# Patient Record
Sex: MALE | NOT HISPANIC OR LATINO | Employment: FULL TIME | URBAN - METROPOLITAN AREA
[De-identification: names, ages, dates, MRNs, and addresses within clinical notes are randomized per-mention and may not be internally consistent; named-entity substitution may affect disease eponyms.]

---

## 2018-01-04 ENCOUNTER — OFFICE VISIT CONVERTED (OUTPATIENT)
Dept: FAMILY MEDICINE CLINIC | Facility: CLINIC | Age: 43
End: 2018-01-04
Attending: NURSE PRACTITIONER

## 2018-04-26 ENCOUNTER — OFFICE VISIT CONVERTED (OUTPATIENT)
Dept: FAMILY MEDICINE CLINIC | Facility: CLINIC | Age: 43
End: 2018-04-26
Attending: NURSE PRACTITIONER

## 2018-06-12 ENCOUNTER — OFFICE VISIT CONVERTED (OUTPATIENT)
Dept: FAMILY MEDICINE CLINIC | Facility: CLINIC | Age: 43
End: 2018-06-12
Attending: NURSE PRACTITIONER

## 2018-07-16 ENCOUNTER — OFFICE VISIT CONVERTED (OUTPATIENT)
Dept: FAMILY MEDICINE CLINIC | Facility: CLINIC | Age: 43
End: 2018-07-16
Attending: NURSE PRACTITIONER

## 2018-08-16 ENCOUNTER — OFFICE VISIT CONVERTED (OUTPATIENT)
Dept: FAMILY MEDICINE CLINIC | Facility: CLINIC | Age: 43
End: 2018-08-16
Attending: NURSE PRACTITIONER

## 2019-02-20 ENCOUNTER — OFFICE VISIT CONVERTED (OUTPATIENT)
Dept: FAMILY MEDICINE CLINIC | Facility: CLINIC | Age: 44
End: 2019-02-20
Attending: NURSE PRACTITIONER

## 2019-03-05 ENCOUNTER — HOSPITAL ENCOUNTER (OUTPATIENT)
Dept: LAB | Facility: HOSPITAL | Age: 44
Discharge: HOME | End: 2019-03-05
Attending: NURSE PRACTITIONER

## 2019-03-05 LAB
ALBUMIN SERPL-MCNC: 4.7 G/DL (ref 3.5–5)
ALBUMIN/GLOB SERPL: 1.7 {RATIO} (ref 1.4–2.6)
ALP SERPL-CCNC: 90 U/L (ref 53–128)
ALT SERPL-CCNC: 45 U/L (ref 10–40)
ANION GAP SERPL CALC-SCNC: 18 MMOL/L (ref 8–19)
AST SERPL-CCNC: 34 U/L (ref 15–50)
BASOPHILS # BLD AUTO: 0.09 10*3/UL (ref 0–0.2)
BASOPHILS NFR BLD AUTO: 1 % (ref 0–3)
BILIRUB SERPL-MCNC: 0.68 MG/DL (ref 0.2–1.3)
BUN SERPL-MCNC: 10 MG/DL (ref 5–25)
BUN/CREAT SERPL: 9 {RATIO} (ref 6–20)
CALCIUM SERPL-MCNC: 9.4 MG/DL (ref 8.7–10.4)
CHLORIDE SERPL-SCNC: 104 MMOL/L (ref 99–111)
CHOLEST SERPL-MCNC: 256 MG/DL (ref 107–200)
CHOLEST/HDLC SERPL: 5.1 {RATIO} (ref 3–6)
CONV ABS IMM GRAN: 0.04 10*3/UL (ref 0–0.2)
CONV CO2: 28 MMOL/L (ref 22–32)
CONV IMMATURE GRAN: 0.4 % (ref 0–1.8)
CONV TOTAL PROTEIN: 7.4 G/DL (ref 6.3–8.2)
CREAT UR-MCNC: 1.06 MG/DL (ref 0.7–1.2)
DEPRECATED RDW RBC AUTO: 45.8 FL (ref 35.1–43.9)
EOSINOPHIL # BLD AUTO: 0.22 10*3/UL (ref 0–0.7)
EOSINOPHIL # BLD AUTO: 2.3 % (ref 0–7)
ERYTHROCYTE [DISTWIDTH] IN BLOOD BY AUTOMATED COUNT: 13.2 % (ref 11.6–14.4)
GFR SERPLBLD BASED ON 1.73 SQ M-ARVRAT: >60 ML/MIN/{1.73_M2}
GLOBULIN UR ELPH-MCNC: 2.7 G/DL (ref 2–3.5)
GLUCOSE SERPL-MCNC: 100 MG/DL (ref 70–99)
HBA1C MFR BLD: 18.1 G/DL (ref 14–18)
HCT VFR BLD AUTO: 52.4 % (ref 42–52)
HDLC SERPL-MCNC: 50 MG/DL (ref 40–60)
LDLC SERPL CALC-MCNC: 130 MG/DL (ref 70–100)
LYMPHOCYTES # BLD AUTO: 2.79 10*3/UL (ref 1–5)
MCH RBC QN AUTO: 32.6 PG (ref 27–31)
MCHC RBC AUTO-ENTMCNC: 34.5 G/DL (ref 33–37)
MCV RBC AUTO: 94.2 FL (ref 80–96)
MONOCYTES # BLD AUTO: 0.96 10*3/UL (ref 0.2–1.2)
MONOCYTES NFR BLD AUTO: 10.2 % (ref 3–10)
NEUTROPHILS # BLD AUTO: 5.3 10*3/UL (ref 2–8)
NEUTROPHILS NFR BLD AUTO: 56.4 % (ref 30–85)
NRBC CBCN: 0 % (ref 0–0.7)
OSMOLALITY SERPL CALC.SUM OF ELEC: 299 MOSM/KG (ref 273–304)
PLATELET # BLD AUTO: 324 10*3/UL (ref 130–400)
PMV BLD AUTO: 9.6 FL (ref 9.4–12.4)
POTASSIUM SERPL-SCNC: 4.5 MMOL/L (ref 3.5–5.3)
RBC # BLD AUTO: 5.56 10*6/UL (ref 4.7–6.1)
SODIUM SERPL-SCNC: 145 MMOL/L (ref 135–147)
T4 FREE SERPL-MCNC: 1.2 NG/DL (ref 0.9–1.8)
TRIGL SERPL-MCNC: 382 MG/DL (ref 40–150)
TSH SERPL-ACNC: 1.41 M[IU]/L (ref 0.27–4.2)
VARIANT LYMPHS NFR BLD MANUAL: 29.7 % (ref 20–45)
VLDLC SERPL-MCNC: 76 MG/DL (ref 5–37)
WBC # BLD AUTO: 9.4 10*3/UL (ref 4.8–10.8)

## 2019-08-13 ENCOUNTER — HOSPITAL ENCOUNTER (OUTPATIENT)
Dept: LAB | Facility: HOSPITAL | Age: 44
Discharge: HOME | End: 2019-08-13
Attending: NURSE PRACTITIONER

## 2019-08-13 LAB
ALBUMIN SERPL-MCNC: 4.8 G/DL (ref 3.5–5)
ALBUMIN/GLOB SERPL: 1.9 {RATIO} (ref 1.4–2.6)
ALP SERPL-CCNC: 70 U/L (ref 53–128)
ALT SERPL-CCNC: 38 U/L (ref 10–40)
ANION GAP SERPL CALC-SCNC: 17 MMOL/L (ref 8–19)
AST SERPL-CCNC: 31 U/L (ref 15–50)
BILIRUB SERPL-MCNC: 0.57 MG/DL (ref 0.2–1.3)
BUN SERPL-MCNC: 9 MG/DL (ref 5–25)
BUN/CREAT SERPL: 10 {RATIO} (ref 6–20)
CALCIUM SERPL-MCNC: 9.5 MG/DL (ref 8.7–10.4)
CHLORIDE SERPL-SCNC: 104 MMOL/L (ref 99–111)
CHOLEST SERPL-MCNC: 197 MG/DL (ref 107–200)
CHOLEST/HDLC SERPL: 3.1 {RATIO} (ref 3–6)
CONV CO2: 24 MMOL/L (ref 22–32)
CONV TOTAL PROTEIN: 7.3 G/DL (ref 6.3–8.2)
CREAT UR-MCNC: 0.94 MG/DL (ref 0.7–1.2)
GFR SERPLBLD BASED ON 1.73 SQ M-ARVRAT: >60 ML/MIN/{1.73_M2}
GLOBULIN UR ELPH-MCNC: 2.5 G/DL (ref 2–3.5)
GLUCOSE SERPL-MCNC: 99 MG/DL (ref 70–99)
HDLC SERPL-MCNC: 64 MG/DL (ref 40–60)
LDLC SERPL CALC-MCNC: 115 MG/DL (ref 70–100)
OSMOLALITY SERPL CALC.SUM OF ELEC: 291 MOSM/KG (ref 273–304)
POTASSIUM SERPL-SCNC: 4.4 MMOL/L (ref 3.5–5.3)
SODIUM SERPL-SCNC: 141 MMOL/L (ref 135–147)
TRIGL SERPL-MCNC: 91 MG/DL (ref 40–150)
VLDLC SERPL-MCNC: 18 MG/DL (ref 5–37)

## 2019-08-20 ENCOUNTER — CONVERSION ENCOUNTER (OUTPATIENT)
Dept: OTHER | Facility: HOSPITAL | Age: 44
End: 2019-08-20

## 2019-08-20 ENCOUNTER — OFFICE VISIT CONVERTED (OUTPATIENT)
Dept: FAMILY MEDICINE CLINIC | Facility: CLINIC | Age: 44
End: 2019-08-20
Attending: NURSE PRACTITIONER

## 2020-01-28 ENCOUNTER — OFFICE VISIT CONVERTED (OUTPATIENT)
Dept: FAMILY MEDICINE CLINIC | Facility: CLINIC | Age: 45
End: 2020-01-28
Attending: NURSE PRACTITIONER

## 2020-02-28 ENCOUNTER — HOSPITAL ENCOUNTER (OUTPATIENT)
Dept: LAB | Facility: HOSPITAL | Age: 45
Discharge: HOME | End: 2020-02-28
Attending: NURSE PRACTITIONER

## 2020-02-28 LAB
ALBUMIN SERPL-MCNC: 4.8 G/DL (ref 3.5–5)
ALBUMIN/GLOB SERPL: 2 {RATIO} (ref 1.4–2.6)
ALP SERPL-CCNC: 51 U/L (ref 53–128)
ALT SERPL-CCNC: 24 U/L (ref 10–40)
ANION GAP SERPL CALC-SCNC: 19 MMOL/L (ref 8–19)
AST SERPL-CCNC: 23 U/L (ref 15–50)
BASOPHILS # BLD AUTO: 0.07 10*3/UL (ref 0–0.2)
BASOPHILS NFR BLD AUTO: 1.1 % (ref 0–3)
BILIRUB SERPL-MCNC: 0.76 MG/DL (ref 0.2–1.3)
BUN SERPL-MCNC: 11 MG/DL (ref 5–25)
BUN/CREAT SERPL: 13 {RATIO} (ref 6–20)
CALCIUM SERPL-MCNC: 9.7 MG/DL (ref 8.7–10.4)
CHLORIDE SERPL-SCNC: 99 MMOL/L (ref 99–111)
CHOLEST SERPL-MCNC: 266 MG/DL (ref 107–200)
CHOLEST/HDLC SERPL: 3.9 {RATIO} (ref 3–6)
CONV ABS IMM GRAN: 0.04 10*3/UL (ref 0–0.2)
CONV CO2: 26 MMOL/L (ref 22–32)
CONV IMMATURE GRAN: 0.6 % (ref 0–1.8)
CONV TOTAL PROTEIN: 7.2 G/DL (ref 6.3–8.2)
CREAT UR-MCNC: 0.85 MG/DL (ref 0.7–1.2)
DEPRECATED RDW RBC AUTO: 46.6 FL (ref 35.1–43.9)
EOSINOPHIL # BLD AUTO: 0.22 10*3/UL (ref 0–0.7)
EOSINOPHIL # BLD AUTO: 3.4 % (ref 0–7)
ERYTHROCYTE [DISTWIDTH] IN BLOOD BY AUTOMATED COUNT: 13.5 % (ref 11.6–14.4)
GFR SERPLBLD BASED ON 1.73 SQ M-ARVRAT: >60 ML/MIN/{1.73_M2}
GLOBULIN UR ELPH-MCNC: 2.4 G/DL (ref 2–3.5)
GLUCOSE SERPL-MCNC: 84 MG/DL (ref 70–99)
HCT VFR BLD AUTO: 51.8 % (ref 42–52)
HDLC SERPL-MCNC: 68 MG/DL (ref 40–60)
HGB BLD-MCNC: 17.5 G/DL (ref 14–18)
LDLC SERPL CALC-MCNC: 169 MG/DL (ref 70–100)
LYMPHOCYTES # BLD AUTO: 2.18 10*3/UL (ref 1–5)
LYMPHOCYTES NFR BLD AUTO: 33.2 % (ref 20–45)
MCH RBC QN AUTO: 31.6 PG (ref 27–31)
MCHC RBC AUTO-ENTMCNC: 33.8 G/DL (ref 33–37)
MCV RBC AUTO: 93.5 FL (ref 80–96)
MONOCYTES # BLD AUTO: 0.6 10*3/UL (ref 0.2–1.2)
MONOCYTES NFR BLD AUTO: 9.1 % (ref 3–10)
NEUTROPHILS # BLD AUTO: 3.45 10*3/UL (ref 2–8)
NEUTROPHILS NFR BLD AUTO: 52.6 % (ref 30–85)
NRBC CBCN: 0 % (ref 0–0.7)
OSMOLALITY SERPL CALC.SUM OF ELEC: 287 MOSM/KG (ref 273–304)
PLATELET # BLD AUTO: 283 10*3/UL (ref 130–400)
PMV BLD AUTO: 9 FL (ref 9.4–12.4)
POTASSIUM SERPL-SCNC: 4.5 MMOL/L (ref 3.5–5.3)
RBC # BLD AUTO: 5.54 10*6/UL (ref 4.7–6.1)
SODIUM SERPL-SCNC: 139 MMOL/L (ref 135–147)
T4 FREE SERPL-MCNC: 1.6 NG/DL (ref 0.9–1.8)
TRIGL SERPL-MCNC: 143 MG/DL (ref 40–150)
TSH SERPL-ACNC: 0.84 M[IU]/L (ref 0.27–4.2)
VLDLC SERPL-MCNC: 29 MG/DL (ref 5–37)
WBC # BLD AUTO: 6.56 10*3/UL (ref 4.8–10.8)

## 2020-06-22 ENCOUNTER — OFFICE VISIT CONVERTED (OUTPATIENT)
Dept: ORTHOPEDIC SURGERY | Facility: CLINIC | Age: 45
End: 2020-06-22
Attending: ORTHOPAEDIC SURGERY

## 2020-07-10 ENCOUNTER — HOSPITAL ENCOUNTER (OUTPATIENT)
Dept: URGENT CARE | Facility: CLINIC | Age: 45
Discharge: HOME | End: 2020-07-10
Attending: NURSE PRACTITIONER

## 2020-07-13 LAB — SARS-COV-2 RNA SPEC QL NAA+PROBE: NOT DETECTED

## 2020-07-28 ENCOUNTER — TELEMEDICINE CONVERTED (OUTPATIENT)
Dept: FAMILY MEDICINE CLINIC | Facility: CLINIC | Age: 45
End: 2020-07-28
Attending: NURSE PRACTITIONER

## 2020-07-28 ENCOUNTER — HOSPITAL ENCOUNTER (OUTPATIENT)
Dept: URGENT CARE | Facility: CLINIC | Age: 45
Discharge: HOME | End: 2020-07-28
Attending: NURSE PRACTITIONER

## 2020-07-30 LAB — SARS-COV-2 RNA SPEC QL NAA+PROBE: NOT DETECTED

## 2020-08-07 ENCOUNTER — HOSPITAL ENCOUNTER (OUTPATIENT)
Dept: URGENT CARE | Facility: CLINIC | Age: 45
Discharge: HOME | End: 2020-08-07
Attending: NURSE PRACTITIONER

## 2020-08-10 LAB — SARS-COV-2 RNA SPEC QL NAA+PROBE: NOT DETECTED

## 2020-09-09 ENCOUNTER — HOSPITAL ENCOUNTER (OUTPATIENT)
Dept: GENERAL RADIOLOGY | Facility: HOSPITAL | Age: 45
Discharge: HOME | End: 2020-09-09
Attending: NURSE PRACTITIONER

## 2020-09-14 ENCOUNTER — OFFICE VISIT CONVERTED (OUTPATIENT)
Dept: SURGERY | Facility: CLINIC | Age: 45
End: 2020-09-14
Attending: NURSE PRACTITIONER

## 2020-11-25 ENCOUNTER — HOSPITAL ENCOUNTER (OUTPATIENT)
Dept: PREADMISSION TESTING | Facility: HOSPITAL | Age: 45
Discharge: HOME | End: 2020-11-25
Attending: SURGERY

## 2020-11-27 LAB — SARS-COV-2 RNA SPEC QL NAA+PROBE: NOT DETECTED

## 2020-11-30 ENCOUNTER — HOSPITAL ENCOUNTER (OUTPATIENT)
Dept: GASTROENTEROLOGY | Facility: HOSPITAL | Age: 45
Setting detail: HOSPITAL OUTPATIENT SURGERY
Discharge: HOME | End: 2020-11-30
Attending: SURGERY

## 2020-12-14 ENCOUNTER — OFFICE VISIT CONVERTED (OUTPATIENT)
Dept: SURGERY | Facility: CLINIC | Age: 45
End: 2020-12-14
Attending: NURSE PRACTITIONER

## 2021-02-03 ENCOUNTER — OFFICE VISIT CONVERTED (OUTPATIENT)
Dept: FAMILY MEDICINE CLINIC | Facility: CLINIC | Age: 46
End: 2021-02-03
Attending: NURSE PRACTITIONER

## 2021-03-18 ENCOUNTER — HOSPITAL ENCOUNTER (OUTPATIENT)
Dept: LAB | Facility: HOSPITAL | Age: 46
Discharge: HOME | End: 2021-03-18
Attending: NURSE PRACTITIONER

## 2021-03-18 LAB
ALBUMIN SERPL-MCNC: 4.6 G/DL (ref 3.5–5)
ALBUMIN/GLOB SERPL: 1.6 {RATIO} (ref 1.4–2.6)
ALP SERPL-CCNC: 70 U/L (ref 53–128)
ALT SERPL-CCNC: 30 U/L (ref 10–40)
ANION GAP SERPL CALC-SCNC: 14 MMOL/L (ref 8–19)
AST SERPL-CCNC: 25 U/L (ref 15–50)
BASOPHILS # BLD AUTO: 0.07 10*3/UL (ref 0–0.2)
BASOPHILS NFR BLD AUTO: 0.8 % (ref 0–3)
BILIRUB SERPL-MCNC: 0.55 MG/DL (ref 0.2–1.3)
BUN SERPL-MCNC: 15 MG/DL (ref 5–25)
BUN/CREAT SERPL: 16 {RATIO} (ref 6–20)
CALCIUM SERPL-MCNC: 9.7 MG/DL (ref 8.7–10.4)
CHLORIDE SERPL-SCNC: 105 MMOL/L (ref 99–111)
CHOLEST SERPL-MCNC: 174 MG/DL (ref 107–200)
CHOLEST/HDLC SERPL: 2.7 {RATIO} (ref 3–6)
CONV ABS IMM GRAN: 0.03 10*3/UL (ref 0–0.2)
CONV CO2: 27 MMOL/L (ref 22–32)
CONV IMMATURE GRAN: 0.4 % (ref 0–1.8)
CONV TOTAL PROTEIN: 7.4 G/DL (ref 6.3–8.2)
CREAT UR-MCNC: 0.93 MG/DL (ref 0.7–1.2)
DEPRECATED RDW RBC AUTO: 45.9 FL (ref 35.1–43.9)
EOSINOPHIL # BLD AUTO: 0.2 10*3/UL (ref 0–0.7)
EOSINOPHIL # BLD AUTO: 2.4 % (ref 0–7)
ERYTHROCYTE [DISTWIDTH] IN BLOOD BY AUTOMATED COUNT: 13.4 % (ref 11.6–14.4)
GFR SERPLBLD BASED ON 1.73 SQ M-ARVRAT: >60 ML/MIN/{1.73_M2}
GLOBULIN UR ELPH-MCNC: 2.8 G/DL (ref 2–3.5)
GLUCOSE SERPL-MCNC: 105 MG/DL (ref 70–99)
HCT VFR BLD AUTO: 47.9 % (ref 42–52)
HDLC SERPL-MCNC: 64 MG/DL (ref 40–60)
HGB BLD-MCNC: 16.1 G/DL (ref 14–18)
LDLC SERPL CALC-MCNC: 96 MG/DL (ref 70–100)
LYMPHOCYTES # BLD AUTO: 2.53 10*3/UL (ref 1–5)
LYMPHOCYTES NFR BLD AUTO: 30.2 % (ref 20–45)
MCH RBC QN AUTO: 31.5 PG (ref 27–31)
MCHC RBC AUTO-ENTMCNC: 33.6 G/DL (ref 33–37)
MCV RBC AUTO: 93.7 FL (ref 80–96)
MONOCYTES # BLD AUTO: 0.77 10*3/UL (ref 0.2–1.2)
MONOCYTES NFR BLD AUTO: 9.2 % (ref 3–10)
NEUTROPHILS # BLD AUTO: 4.79 10*3/UL (ref 2–8)
NEUTROPHILS NFR BLD AUTO: 57 % (ref 30–85)
NRBC CBCN: 0 % (ref 0–0.7)
OSMOLALITY SERPL CALC.SUM OF ELEC: 295 MOSM/KG (ref 273–304)
PLATELET # BLD AUTO: 322 10*3/UL (ref 130–400)
PMV BLD AUTO: 9.3 FL (ref 9.4–12.4)
POTASSIUM SERPL-SCNC: 4.4 MMOL/L (ref 3.5–5.3)
RBC # BLD AUTO: 5.11 10*6/UL (ref 4.7–6.1)
SODIUM SERPL-SCNC: 142 MMOL/L (ref 135–147)
T4 FREE SERPL-MCNC: 1.4 NG/DL (ref 0.9–1.8)
TRIGL SERPL-MCNC: 70 MG/DL (ref 40–150)
TSH SERPL-ACNC: 0.91 M[IU]/L (ref 0.27–4.2)
VLDLC SERPL-MCNC: 14 MG/DL (ref 5–37)
WBC # BLD AUTO: 8.39 10*3/UL (ref 4.8–10.8)

## 2021-05-10 NOTE — H&P
History and Physical      Patient Name: Agustin Stubbs   Patient ID: [id number]   Sex: Male   YOB: 1975    Primary Care Provider: Indy SOTO   Referring Provider: Indy SOTO    Visit Date: June 22, 2020    Provider: Conrado Rasmussen MD   Location: [facility]   Location Address: [address] [place] [address]  [id number]   Location Phone: [phone]          Chief Complaint  · Right Elbow Pain   · Left Hand Pain      History Of Present Illness  Agustin Stubbs is a 44 year old Other Race male who presents today to [facility]. Patient is here for right elbow pain, left hand pain. Patient states he does do a lot of repetitive motion. Patient states pain on the lateral side of the right elbow. He states pain in the left hand of the 3rd and 4th fingers. Patient states trying anti-inflammatory with minimal relief of pain.       Past Medical History  *No Pertinent Past Medical History; Hyperlipemia; Hypertension         Past Surgical History  *No Past Surgical History         Medication List  atorvastatin 20 mg oral tablet; fenofibrate 160 mg oral tablet; lisinopril-hydrochlorothiazide 10-12.5 mg oral tablet; meloxicam 15 mg oral tablet; Mobic 15 mg oral tablet         Allergy List  PENICILLINS         Family Medical History  Breast Neoplasm, Malignant; Lung Neoplasm, Malignant; Cancer, Unspecified; Diabetes, unspecified type         Social History  Active but no formal exercise; Alcohol (Current every day); Alcohol Use (Current some day); ; lives with children; lives with spouse; Recreational Drug Use (Never); Single.; Tobacco (Current every day); Working         Review of Systems  · Constitutional  o Denies  o : fever, chills, weight loss  · Cardiovascular  o Denies  o : chest pain, shortness of breath  · Gastrointestinal  o Denies  o : liver disease, heartburn, nausea, blood in stools  · Genitourinary  o Denies  o : painful urination, blood in  "urine  · Integument  o Denies  o : rash, itching  · Neurologic  o Denies  o : headache, weakness, loss of consciousness  · Musculoskeletal  o Denies  o : painful, swollen joints  · Psychiatric  o Denies  o : drug/alcohol addiction, anxiety, depression      Vitals  Date Time BP Position Site L\R Cuff Size HR RR TEMP (F) WT  HT  BMI kg/m2 BSA m2 O2 Sat        06/22/2020 02:47 PM      76 - R   195lbs 2oz 5'  9\" 28.81 2.08 97 %          Physical Examination  · Constitutional  o Appearance  o : well developed, well-nourished, no obvious deformities present  · Head and Face  o Head  o :   § Inspection  § : normocephalic  o Face  o :   § Inspection  § : no facial lesions  · Eyes  o Conjunctivae  o : conjunctivae normal  o Sclerae  o : sclerae white  · Ears, Nose, Mouth and Throat  o Ears  o :   § External Ears  § : appearance within normal limits  § Hearing  § : intact  o Nose  o :   § External Nose  § : appearance normal  · Neck  o Inspection/Palpation  o : normal appearance  o Range of Motion  o : full range of motion  · Respiratory  o Respiratory Effort  o : breathing unlabored  o Inspection of Chest  o : normal appearance  o Auscultation of Lungs  o : no audible wheezing or rales  · Cardiovascular  o Heart  o : regular rate  · Gastrointestinal  o Abdominal Examination  o : soft and non-tender  · Skin and Subcutaneous Tissue  o General Inspection  o : intact, no rashes  · Psychiatric  o General  o : Alert and oriented x3  o Judgement and Insight  o : judgment and insight intact  o Mood and Affect  o : mood normal, affect appropriate  · Right Elbow  o Inspection  o : No skin discoloration, atrophy or swelling. Mild tenderness over the lateral epicondyle. He has full range of motion. Neurovascularly and sensation grossly intact.   · Left Hand  o Inspection  o : No skin discoloration, atrophy or swelling. Mild tenderness over the 3rd and 4th A1-Pulley. Patient has full range of motion. No triggering at this time. "   · Injection Note/Aspiration Note  o Site  o : right elbow  o Procedure  o : Procedure: After educating the patient, patient gave consent for procedure. After using Chloraprep, the joint space was injected. The patient tolerated the procedure well.   o Medication  o : 80 mg of DepoMedrol with 1cc of 1% Lidocaine  · In Office Procedures  o View  o : AP/LATERAL  o Site  o : right elbow, left hand  o Indication  o : Right elbow pain. Left hand pain.  o Study  o : X-rays ordered, taken in the office, and reviewed today.  o Xray  o : X-rays are negative for fractures or dislocation.   o Comparative Data  o : No comparative data found          Assessment  · Right elbow pain     719.42/M25.521  · Arthralgia of left hand     719.44/M25.542  · Lateral epicondylitis of right elbow     726.32/M77.11  · Left hand 3rd and 4th finger pain     729.5/M79.645      Plan  · Orders  o Depo-Medrol injection 80mg () - - 06/22/2020   Lot [id number] Exp 06 2021 [employer] Administered by YARON Rasmussen MD  o Elbow-Lat Single Tendon (Injection) (88904) - - 06/22/2020   Lot [id number] Exp 07 01 2021 [employer] Administered by YARON Rasmussen MD  o Elbow (Right) 2 views X-Ray [facility] (18804-QL) - 719.42/M25.521 - 06/22/2020  o Hand (Left) [facility] Preferred View ([id number]-FU) - 719.44/M25.542 - 06/22/2020  · Medications  o Medications have been Reconciled  o Transition of Care or Provider Policy  · Instructions  o Reviewed the patient's Past Medical, Social, and Family history as well as the ROS at today's visit, no changes.  o Call or return if worsening symptoms.  o This note is transcribed by Vivi vasquez/nydia  o Right elbow steroid injection. Home exercises. Discussed at length for stretching. Follow-up as needed.             Electronically Signed by: Vivi Hu, -Author on June 25, 2020 08:19:51 AM  Electronically Co-signed by: Vianey Raymond PA-C -Reviewer on June 25, 2020 11:19:07 AM  Electronically Co-signed by: Conrado Rasmussen MD -Reviewer on June  26, 2020 04:17:58 PM

## 2021-05-10 NOTE — H&P
History and Physical      Patient Name: Agustin Stubbs   Patient ID: [id number]   Sex: Male   YOB: 1975    Primary Care Provider: Indy SOTO   Referring Provider: Indy SOTO    Visit Date: September 14, 2020    Provider: BRITATNY Augustine   Location: [facility]   Location Address: [address] [place] [address]  [id number]   Location Phone: [phone]          Chief Complaint  · Requesting EGD      History Of Present Illness  The patient is a 44 year old Other Race male presenting to the Surgical Specialist office on a referral from Indy SOTO.   Agustin Stubbs needs to have a diagnostic EGD.     Patient currently complains of: GERD and coughing up blood        Patient presents today on a referral from GERD and coughing up blood while brushing his teeth. He reports that he is seeing BRB when he manipulates his gag reflex while brushing his teeth 3-4x/week. Admits to some GERD symptoms. Reports that he has lost 75 lbs on KETO diet and his GERD symptoms seemed to have improved. Denies any dysphagia or abdominal pain.    Patient was on meloxicam for about 6 months and it was stopped 2 months ago. No other blood thinners noted.       Past Medical History  Disease Name Date Onset Notes   *No Pertinent Past Medical History --  --    Allergic rhinitis, chronic --  --    High blood pressure --  --    Hyperlipemia --  --    Hypertension --  --          Past Surgical History  Procedure Name Date Notes   *No Past Surgical History --  --          Medication List  Name Date Started Instructions   atorvastatin 20 mg oral tablet 02/28/2020 take 1 tablet (20 mg) by oral route once daily for 90 days   fenofibrate 160 mg oral tablet  take 1 tablet (160 mg) by oral route once daily   lisinopril-hydrochlorothiazide 10-12.5 mg oral tablet 09/08/2020 TAKE 1 TABLET BY MOUTH EVERY DAY         Allergy List  Allergen Name Date Reaction Notes   NO KNOWN DRUG ALLERGIES --   "--  --    PENICILLINS --  --  --        Allergies Reconciled  Family Medical History  Disease Name Relative/Age Notes   Breast Neoplasm, Malignant Aunt/   --    Lung Neoplasm, Malignant Uncle/   --    Cancer, Unspecified Brother/  Mother/   Mother; Brother   Diabetes, unspecified type Father/  Mother/   Mother; Father   Renal Calculus Father/   Father   Family history of breast cancer  Aunt/30s         Social History  Finding Status Start/Stop Quantity Notes   Active but no formal exercise --  --/-- --  --    Alcohol Current every day --/-- --  50 per week   Alcohol Use Current some day --/-- --  drinks weekly, 4 drinks per day, has been drinking for 21-30 years    --  --/-- --  --    lives with children --  --/-- --  --    lives with spouse --  --/-- --  --    Recreational Drug Use Never --/-- --  no   Single. --  --/-- --  --    Tobacco Current every day --/-- 1.5 PPD current every day smoker, 1.5 packs per day, smoked 21-30 years  2ppd for 18yrs   Working --  --/-- --  --          Review of Systems  · Constitutional  o Denies  o : fever, chills  · Eyes  o Denies  o : yellowish discoloration of eyes  · HENT  o Denies  o : difficulty swallowing  · Cardiovascular  o Denies  o : chest pain, chest pain on exertion  · Respiratory  o Denies  o : shortness of breath  · Gastrointestinal  o Admits  o : heartburn, reflux  o Denies  o : nausea, vomiting, diarrhea, constipation  · Genitourinary  o Denies  o : abnormal color of urine  · Integument  o Denies  o : rash  · Neurologic  o Denies  o : tingling or numbness  · Musculoskeletal  o Denies  o : joint pain  · Endocrine  o Denies  o : weight gain, weight loss      Vitals  Date Time BP Position Site L\R Cuff Size HR RR TEMP (F) WT  HT  BMI kg/m2 BSA m2 O2 Sat HC       09/14/2020 01:55 PM       16  187lbs 6oz 5'  9\" 27.67 2.03           Physical Examination  · Constitutional  o Appearance  o : well developed, well-nourished, patient in no apparent distress  · Head " and Face  o Head  o :   § Inspection  § : atraumatic, normocephalic  o Face  o :   § Inspection  § : no facial lesions  · Eyes  o Conjunctivae  o : conjunctivae normal  o Sclerae  o : sclerae white  · Neck  o Inspection/Palpation  o : normal appearance, no masses or tenderness, trachea midline  · Respiratory  o Respiratory Effort  o : breathing unlabored  · Skin and Subcutaneous Tissue  o General Inspection  o : no lesions present, no areas of discoloration, skin turgor normal, texture normal  · Neurologic  o Mental Status Examination  o :   § Orientation  § : grossly oriented to person, place and time  § Attention  § : attention normal, concentration abilities normal  § Fund of Knowledge  § : fund of knowledge within normal limits, patient aware of current events  o Gait and Station  o : normal gait, able to stand without difficulty  · Psychiatric  o Judgement and Insight  o : judgment and insight intact  o Mood and Affect  o : mood normal, affect appropriate          Assessment  · GERD (gastroesophageal reflux disease)     530.81/K21.9  · Hemoptysis, unspecified     786.30/R04.2  · Pre-op testing     V72.84/Z01.818    Problems Reconciled  Plan  · Orders  o Consent for Esophagogastroduodenoscopy (EGD) with dilation - Possible risks/complications, benefits, and alternatives to surgical or invasive procedure have been explained to patient and/or legal guardian. - Patient has been evaluated and can tolerate anesthesia and/or sedation. Risks, benefits, and alternatives to anesthesia and sedation have been explained to patient and/or legal guardian. (21972) - 530.81/K21.9, 786.30/R04.2 - 11/30/2020  o [facility] Pre-Op Covid-19 Screening (93386) - V72.84/Z01.818 - 11/25/2020   [address] @ 4:15pm  · Medications  o Medications have been Reconciled  o Transition of Care or Provider Policy  · Instructions  o Surgical Facility: [facility]  o Handouts Provided Pre-Procedure Instructions including date, time,  and location of procedure.   o PLAN: Proceeed with EGD. Patient understands risks/benefits and is willing to proceed.   o ***Surgical Orders***  o RISK AND BENEFITS:  o Given these options, the patient has verbally expressed an understanding of the risks of the surgery and finds these risks acceptable. Will proceed with surgery as soon as possible.  o O.R. PREP: Per protocol   o IV: Per Anesthesia  o Please sign permit for: Esophagogastroduodenoscopy with possible biopsies and dilation by Dr. Perla.  o The above History and Physical Examination has been completed within 30 days of admission.  o ***Patient Status***  o Outpatient  o Follow up in the in the office post procedure.  o Advised patient that he would need COVID-19 testing prior to procedure. Encouraged patient to self-isolate between testing and procedure. Patient verbalizes understanding and is willing to proceed.   o Electronically Identified Patient Education Materials Provided Electronically            Electronically Signed by: BRITTANY Augustine -Author on September 14, 2020 02:19:13 PM

## 2021-05-10 NOTE — H&P
History and Physical      Patient Name: Agustin Stubbs   Patient ID: [id number]   Sex: Male   YOB: 1975    Primary Care Provider: Indy SOTO   Referring Provider: Indy SOTO    Visit Date: December 14, 2020    Provider: BRITTANY Augustine   Location: [facility]   Location Address: [address] [place] [address]  [id number]   Location Phone: [phone]          Chief Complaint  · Follow Up Colonoscopy and EGD      History Of Present Illness  Agustin Stubbs is a 45 year old Other Race male who is here to follow up colonoscopy and EGD.      Patient presents today for telephone visit patient presents today for follow-up visit after undergoing a EGD and colonoscopy on 11/30/2020 performed by Dr. Aaron Perla.  Patient was with esophagitis per EGD/pathology.  Also with a tubular adenoma with high-grade dysplasia of the sigmoid colon per colonoscopy/pathology.  Patient denies any postoperative complications.       Past Medical History  Disease Name Date Onset Notes   *No Pertinent Past Medical History --  --    Allergic rhinitis, chronic --  --    High blood pressure --  --    Hyperlipemia --  --    Hypertension --  --          Past Surgical History  Procedure Name Date Notes   *No Past Surgical History --  --          Medication List  Name Date Started Instructions   atorvastatin 20 mg oral tablet 02/28/2020 take 1 tablet (20 mg) by oral route once daily for 90 days   fenofibrate 160 mg oral tablet  take 1 tablet (160 mg) by oral route once daily   lisinopril-hydrochlorothiazide 10-12.5 mg oral tablet 09/08/2020 TAKE 1 TABLET BY MOUTH EVERY DAY         Allergy List  Allergen Name Date Reaction Notes   NO KNOWN DRUG ALLERGIES --  --  --    PENICILLINS --  --  --        Allergies Reconciled  Family Medical History  Disease Name Relative/Age Notes   Breast Neoplasm, Malignant Aunt/   --    Lung Neoplasm, Malignant Uncle/   --    Cancer, Unspecified  "Brother/  Mother/   Mother; Brother   Diabetes, unspecified type Father/  Mother/   Mother; Father   Renal Calculus Father/   Father   Family history of breast cancer  Aunt/30s         Social History  Finding Status Start/Stop Quantity Notes   Active but no formal exercise --  --/-- --  --    Alcohol Current every day --/-- --  50 per week   Alcohol Use Current some day --/-- --  drinks weekly, 4 drinks per day, has been drinking for 21-30 years    --  --/-- --  --    lives with children --  --/-- --  --    lives with spouse --  --/-- --  --    Recreational Drug Use Never --/-- --  no   Single. --  --/-- --  --    Tobacco Current every day --/-- 1.5 PPD current every day smoker, 1.5 packs per day, smoked 21-30 years  2ppd for 18yrs   Working --  --/-- --  --          Review of Systems  · Constitutional  o Denies  o : chills, fever  · Eyes  o Denies  o : yellowish discoloration of eyes  · HENT  o Denies  o : difficulty swallowing  · Cardiovascular  o Denies  o : chest pain on exertion  · Respiratory  o Denies  o : shortness of breath  · Gastrointestinal  o Denies  o : nausea, vomiting, diarrhea, constipation  · Genitourinary  o Denies  o : abnormal color of urine  · Integument  o Denies  o : rash  · Neurologic  o Denies  o : tingling or numbness  · Musculoskeletal  o Denies  o : joint pain  · Endocrine  o Denies  o : weight gain, weight loss      Vitals  Date Time BP Position Site L\R Cuff Size HR RR TEMP (F) WT  HT  BMI kg/m2 BSA m2 O2 Sat FR L/min FiO2        12/14/2020 02:47 PM       14  197lbs 2oz 5'  8\" 29.97 2.07             Physical Examination  · Constitutional  o Appearance  o : well developed, well-nourished, patient in no apparent distress  · Head and Face  o Head  o :   § Inspection  § : atraumatic, normocephalic  o Face  o :   § Inspection  § : no facial lesions  · Eyes  o Conjunctivae  o : conjunctivae normal  o Sclerae  o : sclerae white  · Neck  o Inspection/Palpation  o : normal " appearance, no masses or tenderness, trachea midline  · Respiratory  o Respiratory Effort  o : breathing unlabored  · Skin and Subcutaneous Tissue  o General Inspection  o : no lesions present, no areas of discoloration, skin turgor normal, texture normal  · Neurologic  o Mental Status Examination  o :   § Orientation  § : grossly oriented to person, place and time  § Attention  § : attention normal, concentration abilities normal  § Fund of Knowledge  § : fund of knowledge within normal limits, patient aware of current events  o Gait and Station  o : normal gait, able to stand without difficulty  · Psychiatric  o Judgement and Insight  o : judgment and insight intact  o Mood and Affect  o : mood normal, affect appropriate              Assessment  · Esophagitis     530.10/K20.9  · Tubular adenoma     229.9/D36.9  · S/P colonoscopy with polypectomy     V45.89/Z98.890  · S/P endoscopy     V45.89/Z98.890  · High grade dysplasia in colonic adenoma     211.3/D12.6    Problems Reconciled  Plan  · Medications  o Medications have been Reconciled  o Transition of Care or Provider Policy  · Instructions  o Per the AGA guidelines of 2012 patient is to follow up for colonoscopy surveillance  o Rescreen: In 1 year follow-up in the interim as needed.  o start omeprazole daily  o Electronically Identified Patient Education Materials Provided Electronically  · Disposition  o Call or Return if symptoms worsen or persist.            Electronically Signed by: BRITTANY Augustine -Author on December 14, 2020 03:20:53 PM

## 2021-05-13 NOTE — PROGRESS NOTES
Progress Note      Patient Name: Agustin Stubbs   Patient ID: [id number]   Sex: Male   YOB: 1975    Primary Care Provider: Indy SOTO   Referring Provider: Indy SOTO    Visit Date: July 28, 2020    Provider: BRITTANY Will   Location: [facility]   Location Address: [address] [place] [address], [address]  [place]  [id number]   Location Phone: [phone]          Chief Complaint  · 6 month follow up      History Of Present Illness  Video Conferencing Visit  Agustin Stubbs is a 44 year old Other Race male who is presenting for evaluation via video conferencing via [employer]. Verbal consent obtained before beginning visit.   The following staff were present during this visit: Katerina Escobedo MA   Agustin Stubbs is a 44 year old Other Race male who presents for evaluation and treatment of:      Pt is needing his 6 month follow up, he is doing well, is due labs and does not need refills at this time. He does need to get scheduled for Covid testing, his daughter tested positive and was at his house this weekend.  He has no symptoms at this time.       Past Medical History  Disease Name Date Onset Notes   *No Pertinent Past Medical History --  --    Hyperlipemia --  --    Hypertension --  --          Past Surgical History  Procedure Name Date Notes   *No Past Surgical History --  --          Medication List  Name Date Started Instructions   atorvastatin 20 mg oral tablet 02/28/2020 take 1 tablet (20 mg) by oral route once daily for 90 days   fenofibrate 160 mg oral tablet 01/28/2020 take 1 tablet (160 mg) by oral route once daily for 90 days   lisinopril-hydrochlorothiazide 10-12.5 mg oral tablet 01/28/2020 TAKE 1 TABLET BY MOUTH EVERY DAY   meloxicam 15 mg oral tablet 05/27/2020 TAKE 1 TABLET BY MOUTH EVERY DAY         Allergy List  Allergen Name Date Reaction Notes   PENICILLINS --  --  --          Family Medical History  Disease Name Relative/Age Notes   Breast Neoplasm,  Malignant Aunt/   --    Lung Neoplasm, Malignant Uncle/   --    Cancer, Unspecified Brother/  Mother/   Mother; Brother   Diabetes, unspecified type Father/  Mother/   Mother; Father         Social History  Finding Status Start/Stop Quantity Notes   Active but no formal exercise --  --/-- --  --    Alcohol Current every day --/-- --  50 per week   Alcohol Use Current some day --/-- --  drinks weekly, 4 drinks per day, has been drinking for 21-30 years    --  --/-- --  --    lives with children --  --/-- --  --    lives with spouse --  --/-- --  --    Recreational Drug Use Never --/-- --  no   Single. --  --/-- --  --    Tobacco Current every day --/-- 1.5 PPD current every day smoker, 1.5 packs per day, smoked 21-30 years  2ppd for 18yrs   Working --  --/-- --  --          Immunizations  NameDate Admin Mfg Trade Name Lot Number Route Inj VIS Given VIS Publication   InfluenzaRefused 01/28/2020 NE Not Entered  NE NE     Comments:          Review of Systems  · Constitutional  o Denies  o : fever, fatigue, weight loss, weight gain  · Cardiovascular  o Denies  o : lower extremity edema, claudication, chest pressure, palpitations  · Respiratory  o Denies  o : shortness of breath, wheezing, cough, hemoptysis, dyspnea on exertion  · Gastrointestinal  o Denies  o : nausea, vomiting, diarrhea, constipation, abdominal pain      Physical Examination  · Constitutional  o Appearance  o : well-nourished, well developed, alert, in no acute distress  · Neurologic  o Mental Status Examination  o :   § Orientation  § : grossly oriented to person, place and time  · Psychiatric  o Mood and Affect  o : mood normal, affect appropriate          Assessment  · Essential hypertension     401.9/I10  · Hyperlipidemia     272.4/E78.5  · Routine lab draw     V72.60/Z01.89  · Exposure to COVID-19 virus     V01.79/Z20.828    Problems Reconciled  Plan  · Orders  o CBC with Auto Diff [facility] (69037) - - 07/28/2020  o CMP [facility] ([id number]) - -  07/28/2020  o Lipid Panel [facility] (86037) - - 07/28/2020  o Thyroid Profile (91273, 30387, THYII) - - 07/28/2020  o ACO-39: Current medications updated and reviewed () - - 07/28/2020  o [facility] NCOV2 (send-out) (18089) - - 07/28/2020  · Medications  o Medications have been Reconciled  o Transition of Care or Provider Policy  · Instructions  o Patient advised to monitor blood pressure (B/P) at home and journal readings. Patient informed that a B/P reading at home of more than 130/80 is considered hypertension. For readings greater [id number]/90 or higher patient is advised to follow up in the office with readings for management. Patient advised to limit sodium intake.  o Advised that cheeses and other sources of dairy fats, animal fats, fast food, and the extras (candy, pastries, pies, doughnuts and cookies) all contain LDL raising nutrients. Advised to increase fruits, vegetables, whole grains, and to monitor portion sizes.   o Patient was educated/instructed on their diagnosis, treatment and medications prior to discharge from the clinic today.  o Patient instructed to seek medical attention urgently for new or worsening symptoms.  o Call the office with any concerns or questions.  · Disposition  o Call or Return if symptoms worsen or persist.  o Return Visit Request in/on 6 months +/- 2 weeks (54908).            Electronically Signed by: BRITTANY Will -Author on July 28, 2020 09:03:52 AM

## 2021-05-14 VITALS
HEART RATE: 112 BPM | HEIGHT: 69 IN | DIASTOLIC BLOOD PRESSURE: 87 MMHG | WEIGHT: 194.37 LBS | SYSTOLIC BLOOD PRESSURE: 138 MMHG | BODY MASS INDEX: 28.79 KG/M2 | OXYGEN SATURATION: 97 %

## 2021-05-14 VITALS — BODY MASS INDEX: 29.87 KG/M2 | HEIGHT: 68 IN | RESPIRATION RATE: 14 BRPM | WEIGHT: 197.12 LBS

## 2021-05-14 VITALS — RESPIRATION RATE: 16 BRPM | BODY MASS INDEX: 27.75 KG/M2 | WEIGHT: 187.37 LBS | HEIGHT: 69 IN

## 2021-05-14 NOTE — PROGRESS NOTES
Progress Note      Patient Name: Agustin Stubbs   Patient ID: [id number]   Sex: Male   YOB: 1975    Primary Care Provider: Indy SOTO   Referring Provider: Indy SOTO    Visit Date: February 3, 2021    Provider: BRITTANY Will   Location: [facility] - [place], [place]   Location Address: [address] [place] [address], [address]  [place], [place]  [id number]   Location Phone: [phone]          Chief Complaint  · follow up      History Of Present Illness  Agustin Stubbs is a 45 year old Other Race male who presents for evaluation and treatment of:      Patient is here for follow up. Pt smokes a pack and a half everyday and he is using patches at home trying to quit.    Pt declined flu vaccine.       Past Medical History  Disease Name Date Onset Notes   *No Pertinent Past Medical History --  --    Allergic rhinitis, chronic --  --    High blood pressure --  --    Hyperlipemia --  --    Hypertension --  --          Past Surgical History  Procedure Name Date Notes   *No Past Surgical History --  --          Medication List  Name Date Started Instructions   atorvastatin 10 mg oral tablet 02/01/2021 TAKE 1 TABLET BY MOUTH EVERYDAY AT BEDTIME   esomeprazole magnesium 20 mg oral capsule,delayed release(DR/EC) 12/17/2020 take 1 capsule (20 mg) by oral route once daily at least 1 hour before a meal swallowing whole. Do not crush or chew granules. for 30 days   fenofibrate 160 mg oral tablet 02/01/2021 TAKE 1 TABLET BY MOUTH EVERY DAY   lisinopril-hydrochlorothiazide 10-12.5 mg oral tablet 09/08/2020 TAKE 1 TABLET BY MOUTH EVERY DAY         Allergy List  Allergen Name Date Reaction Notes   NO KNOWN DRUG ALLERGIES --  --  --    PENICILLINS --  --  --          Family Medical History  Disease Name Relative/Age Notes   Breast Neoplasm, Malignant Aunt/   --    Lung Neoplasm, Malignant Uncle/   --    Cancer, Unspecified Brother/  Mother/   Mother; Brother   Diabetes, unspecified type  "Father/  Mother/   Mother; Father   Renal Calculus Father/   Father   Family history of breast cancer  Aunt/30s         Social History  Finding Status Start/Stop Quantity Notes   Active but no formal exercise --  --/-- --  --    Alcohol Current every day --/-- --  50 per week   Alcohol Use Current some day --/-- --  drinks weekly, 4 drinks per day, has been drinking for 21-30 years    --  --/-- --  --    lives with children --  --/-- --  --    lives with spouse --  --/-- --  --    Recreational Drug Use Never --/-- --  no   Single. --  --/-- --  --    Tobacco Current every day --/-- 1.5 PPD current every day smoker, 1.5 packs per day, smoked 21-30 years  2ppd for 18yrs   Working --  --/-- --  --          Immunizations  NameDate Admin Mfg Trade Name Lot Number Route Inj VIS Given VIS Publication   InfluenzaRefused 02/03/2021 NE Not Entered  NE NE     Comments: pt declined         Review of Systems  · Constitutional  o Denies  o : fever, fatigue, weight loss, weight gain  · Cardiovascular  o Denies  o : lower extremity edema, claudication, chest pressure, palpitations  · Respiratory  o Denies  o : shortness of breath, wheezing, cough, hemoptysis, dyspnea on exertion  · Gastrointestinal  o Denies  o : nausea, vomiting, diarrhea, constipation, abdominal pain      Vitals  Date Time BP Position Site L\R Cuff Size HR RR TEMP (F) WT  HT  BMI kg/m2 BSA m2 O2 Sat FR L/min FiO2 HC       08/20/2019 02:03 /85 Sitting    99 - R   185lbs 6oz 5'  9\" 27.37 2.02 96 %      01/28/2020 01:45 /85 Sitting    99 - R   185lbs 16oz 5'  9\" 27.47 2.03 100 %      02/03/2021 02:59 /87 Sitting    112 - R   194lbs 6oz 5'  9\" 28.7 2.07 97 %            Physical Examination  · Constitutional  o Appearance  o : well-nourished, well developed, alert, in no acute distress  · Ears, Nose, Mouth and Throat  o Ears  o :   § External Ears  § : appearance within normal limits, no lesions present  § Otoscopic Examination  § : tympanic " membrane appearance within normal limits bilaterally without perforations, mobility normal  o Nose  o :   § External Nose  § : normal stucture noted.  § Intranasal Exam  § : no swelling, reddness, turbs normal erasmo.  o Oral Cavity  o :   § Oral Mucosa  § : oral mucosa normal without pallor or cyanosis  § Lips  § : lip appearance normal  § Teeth  § : normal dentition for age  § Gums  § : gums pink, non-swollen, no bleeding present  § Tongue  § : tongue appearance normal  § Palate  § : hard palate normal, soft palate appearance normal  o Throat  o :   § Oropharynx  § : no inflammation or lesions present, tonsils within normal limits  · Respiratory  o Respiratory Effort  o : breathing unlabored  o Auscultation of Lungs  o : normal breath sounds throughout  · Cardiovascular  o Heart  o :   § Auscultation of Heart  § : regular rate and rhythm, no murmurs, gallops or rubs  § Palpation of Heart  § : normal apical impulse, no cardiac thrill present  o Peripheral Vascular System  o :   § Extremities  § : no cyanosis, clubbing or edema; less than 2 second refill noted  · Gastrointestinal  o Abdominal Examination  o : abdomen nontender to palpation, tone normal without rigidity or guarding, no masses present, abdominal contour scaphoid  o Liver and spleen  o : no hepatomegaly present, liver nontender to palpation, spleen not palpable  · Skin and Subcutaneous Tissue  o General Inspection  o : no rashes or lesions present, no areas of discoloration  · Neurologic  o Mental Status Examination  o :   § Orientation  § : grossly oriented to person, place and time  o Cranial Nerves  o : cranial nerves intact and symmetric throughout  · Psychiatric  o Mood and Affect  o : mood normal, affect appropriate, denies any SI/HI          Assessment  · Essential hypertension     401.9/I10  · GERD (gastroesophageal reflux disease)     530.81/K21.9  · Hyperlipidemia     272.4/E78.5  · Nicotine dependence     305.1/F17.200  · Screening for  depression     V79.0/Z13.89    Problems Reconciled  Plan  · Orders  o Smoking cessation counseling, 3-10 minutes [facility] ([id number]) - 305.1/F17.200 - 02/03/2021  o ACO-17: Screened for tobacco use AND received tobacco cessation intervention (4004F) - 305.1/F17.200 - 02/03/2021  o ACO-18: Negative screen for clinical depression using a standardized tool () - V79.0/Z13.89 - 02/03/2021  o CBC with Auto Diff [facility] ([id number]) - 401.9/I10 - 02/03/2021  o CMP [facility] ([id number]) - 401.9/I10 - 02/03/2021  o Lipid Panel [facility] ([id number]) - 272.4/E78.5 - 02/03/2021  o Thyroid Profile (THYII, 11274, 89935) - 401.9/I10 - 02/03/2021  o MICAELA Report (KASPR) - - 02/03/2021  o ACO-17: Screened for tobacco use AND received tobacco cessation intervention (4004F) - - 02/03/2021  o ACO-18: Negative screen for clinical depression using a standardized tool () - - 02/03/2021  o ACO-14: Influenza immunization was not administered for reasons documented [facility] () - - 02/03/2021  o ACO-39: Current medications updated and reviewed (1159F, G84) - - 02/03/2021  · Medications  o Medications have been Reconciled  o Transition of Care or Provider Policy  · Instructions  o Advised that cheeses and other sources of dairy fats, animal fats, fast food, and the extras (candy, pastries, pies, doughnuts and cookies) all contain LDL raising nutrients. Advised to increase fruits, vegetables, whole grains, and to monitor portion sizes.   o *Form of nicotine being used: cigs  o Patient was strongly encouraged to discontinue use of any nicotine containing product or minimize the use of the product.  o Discussed smoking cessation and counseling with patient for over 3 minutes.  o Depression Screen completed and scanned into the EMR under the designated folder within the patient's documents.  o Today's PHQ-9 result is 0  o The provider screening met the required time of 15 minutes.  o Patient was educated/instructed on their diagnosis, treatment and medications prior to  discharge from the clinic today.  o Patient instructed to seek medical attention urgently for new or worsening symptoms.  o Call the office with any concerns or questions.  · Disposition  o Call or Return if symptoms worsen or persist.  o Return Visit Request in/on 6 months +/- 2 weeks ([id number]).            Electronically Signed by: BRITTANY Will -Author on February 3, 2021 04:10:12 PM

## 2021-05-15 VITALS — OXYGEN SATURATION: 97 % | HEIGHT: 69 IN | BODY MASS INDEX: 28.9 KG/M2 | HEART RATE: 76 BPM | WEIGHT: 195.12 LBS

## 2021-05-15 VITALS
BODY MASS INDEX: 27.55 KG/M2 | WEIGHT: 186 LBS | OXYGEN SATURATION: 100 % | SYSTOLIC BLOOD PRESSURE: 146 MMHG | DIASTOLIC BLOOD PRESSURE: 85 MMHG | HEART RATE: 99 BPM | HEIGHT: 69 IN

## 2021-05-15 VITALS
SYSTOLIC BLOOD PRESSURE: 126 MMHG | DIASTOLIC BLOOD PRESSURE: 85 MMHG | HEART RATE: 99 BPM | WEIGHT: 185.37 LBS | HEIGHT: 69 IN | BODY MASS INDEX: 27.45 KG/M2 | OXYGEN SATURATION: 96 %

## 2021-05-16 VITALS
BODY MASS INDEX: 32.9 KG/M2 | WEIGHT: 222.12 LBS | HEART RATE: 102 BPM | HEIGHT: 69 IN | OXYGEN SATURATION: 97 % | DIASTOLIC BLOOD PRESSURE: 94 MMHG | SYSTOLIC BLOOD PRESSURE: 136 MMHG

## 2021-05-16 VITALS
HEIGHT: 69 IN | DIASTOLIC BLOOD PRESSURE: 87 MMHG | SYSTOLIC BLOOD PRESSURE: 151 MMHG | BODY MASS INDEX: 34.71 KG/M2 | HEART RATE: 104 BPM | WEIGHT: 234.37 LBS

## 2021-05-16 VITALS
HEIGHT: 69 IN | BODY MASS INDEX: 33.34 KG/M2 | DIASTOLIC BLOOD PRESSURE: 92 MMHG | SYSTOLIC BLOOD PRESSURE: 147 MMHG | HEART RATE: 105 BPM | WEIGHT: 225.12 LBS

## 2021-05-16 VITALS
HEIGHT: 69 IN | HEART RATE: 86 BPM | BODY MASS INDEX: 32.29 KG/M2 | WEIGHT: 218 LBS | DIASTOLIC BLOOD PRESSURE: 85 MMHG | SYSTOLIC BLOOD PRESSURE: 122 MMHG | OXYGEN SATURATION: 97 %

## 2021-05-16 VITALS
BODY MASS INDEX: 32.31 KG/M2 | DIASTOLIC BLOOD PRESSURE: 96 MMHG | HEIGHT: 69 IN | HEART RATE: 104 BPM | WEIGHT: 218.12 LBS | SYSTOLIC BLOOD PRESSURE: 140 MMHG

## 2021-05-16 VITALS
HEIGHT: 69 IN | SYSTOLIC BLOOD PRESSURE: 144 MMHG | DIASTOLIC BLOOD PRESSURE: 92 MMHG | WEIGHT: 225.12 LBS | HEART RATE: 103 BPM | BODY MASS INDEX: 33.34 KG/M2

## 2021-06-10 DIAGNOSIS — I10 ESSENTIAL HYPERTENSION: Primary | ICD-10-CM

## 2021-06-10 PROBLEM — E78.5 HYPERLIPEMIA: Status: ACTIVE | Noted: 2021-06-10

## 2021-06-10 RX ORDER — LISINOPRIL AND HYDROCHLOROTHIAZIDE 12.5; 1 MG/1; MG/1
1 TABLET ORAL DAILY
COMMUNITY
Start: 2021-03-16 | End: 2021-06-10 | Stop reason: SDUPTHER

## 2021-06-10 RX ORDER — ATORVASTATIN CALCIUM 10 MG/1
1 TABLET, FILM COATED ORAL
COMMUNITY
Start: 2021-02-01 | End: 2021-08-03 | Stop reason: SDUPTHER

## 2021-06-10 RX ORDER — FENOFIBRATE 160 MG/1
1 TABLET ORAL DAILY
COMMUNITY
Start: 2021-02-01 | End: 2021-08-03 | Stop reason: SDUPTHER

## 2021-06-10 RX ORDER — LISINOPRIL AND HYDROCHLOROTHIAZIDE 12.5; 1 MG/1; MG/1
TABLET ORAL
Qty: 90 TABLET | Refills: 1 | Status: SHIPPED | OUTPATIENT
Start: 2021-06-10 | End: 2021-08-03 | Stop reason: SDUPTHER

## 2021-08-02 PROBLEM — E78.2 MIXED HYPERLIPIDEMIA: Status: ACTIVE | Noted: 2021-06-10

## 2021-08-02 PROBLEM — I10 HYPERTENSION: Status: RESOLVED | Noted: 2021-06-10 | Resolved: 2021-08-02

## 2021-08-02 PROBLEM — I10 ESSENTIAL HYPERTENSION: Status: ACTIVE | Noted: 2021-08-02

## 2021-08-03 ENCOUNTER — OFFICE VISIT (OUTPATIENT)
Dept: FAMILY MEDICINE CLINIC | Facility: CLINIC | Age: 46
End: 2021-08-03

## 2021-08-03 VITALS
SYSTOLIC BLOOD PRESSURE: 135 MMHG | WEIGHT: 196 LBS | HEART RATE: 82 BPM | HEIGHT: 69 IN | BODY MASS INDEX: 29.03 KG/M2 | DIASTOLIC BLOOD PRESSURE: 76 MMHG | OXYGEN SATURATION: 98 %

## 2021-08-03 DIAGNOSIS — Z00.00 ANNUAL PHYSICAL EXAM: Primary | ICD-10-CM

## 2021-08-03 DIAGNOSIS — E78.2 MIXED HYPERLIPIDEMIA: ICD-10-CM

## 2021-08-03 DIAGNOSIS — I10 ESSENTIAL HYPERTENSION: ICD-10-CM

## 2021-08-03 PROCEDURE — 99396 PREV VISIT EST AGE 40-64: CPT | Performed by: NURSE PRACTITIONER

## 2021-08-03 RX ORDER — LISINOPRIL AND HYDROCHLOROTHIAZIDE 12.5; 1 MG/1; MG/1
1 TABLET ORAL DAILY
Qty: 90 TABLET | Refills: 1 | Status: SHIPPED | OUTPATIENT
Start: 2021-08-03 | End: 2021-12-16 | Stop reason: SDUPTHER

## 2021-08-03 RX ORDER — FENOFIBRATE 160 MG/1
160 TABLET ORAL DAILY
Qty: 90 TABLET | Refills: 1 | Status: SHIPPED | OUTPATIENT
Start: 2021-08-03 | End: 2021-12-16 | Stop reason: SDUPTHER

## 2021-08-03 RX ORDER — ATORVASTATIN CALCIUM 10 MG/1
10 TABLET, FILM COATED ORAL
Qty: 90 TABLET | Refills: 1 | Status: SHIPPED | OUTPATIENT
Start: 2021-08-03 | End: 2021-12-16 | Stop reason: SDUPTHER

## 2021-08-03 NOTE — PROGRESS NOTES
"Chief Complaint  Hyperlipidemia, Hypertension, and Annual Exam    Subjective            Agustin Stubbs presents to [facility]  Pt is here for a CPE. No issues or concerns at this time.     Pt is due CMP and Lipid panel.    Pt would like refills sent to [facility].         [facility]  Past Medical History:   Diagnosis Date   • Chronic allergic rhinitis    • High blood pressure    • Hyperlipemia    • Hypertension    • No pertinent past medical history        ALLERGY  Allergies   Allergen Reactions   • Penicillins Unknown - High Severity        SURGICALHX  History reviewed. No pertinent surgical history.     SOCX  Social History     Tobacco Use   • Smoking status: Current Every Day Smoker     Packs/day: 1.50   • Smokeless tobacco: Never Used   • Tobacco comment: CURRENT EVERY DAY SMOKER, SMOKED 21-30 YEARS   Substance Use Topics   • Alcohol use: Yes     Comment: 4 DRINKS PER DAY, HAS BEEN DRINKING FOR 21-30 YEARS       FAMHX  Family History   Problem Relation Age of Onset   • Cancer Mother    • Diabetes Mother    • Other Father         RENAL CALCULUS   • Diabetes Father    • Cancer Brother    • Breast cancer Other 30        AUNT   • Lung cancer Other         UNCLE        MEDSIGONLY  Current Outpatient Medications on File Prior to Visit   Medication Sig   • [DISCONTINUED] atorvastatin (LIPITOR) 10 MG tablet Take 1 tablet by mouth every night at bedtime.   • [DISCONTINUED] fenofibrate 160 MG tablet Take 1 tablet by mouth Daily.   • [DISCONTINUED] lisinopril-hydrochlorothiazide (PRINZIDE,ZESTORETIC) 10-12.5 MG per tablet TAKE 1 TABLET BY MOUTH EVERY DAY     No current facility-administered medications on file prior to visit.       Health Maintenance Due   Topic Date Due   • ANNUAL PHYSICAL  Never done   • Pneumococcal Vaccine 0-64 (1 of 2 - PPSV23) Never done   • TDAP/TD VACCINES (1 - Tdap) Never done   • HEPATITIS C SCREENING  Never done       Objective     /76   Pulse 82   Ht 175.3 cm (69\") "   Wt 88.9 kg (196 lb)   SpO2 98%   BMI 28.94 kg/m²       Physical Exam  Constitutional:       General: He is not in acute distress.     Appearance: Normal appearance. He is not ill-appearing.   HENT:      Head: Normocephalic and atraumatic.      Right Ear: Tympanic membrane, ear canal and external ear normal.      Left Ear: Tympanic membrane, ear canal and external ear normal.      Nose: Nose normal.      Mouth/Throat:      Pharynx: No oropharyngeal exudate or posterior oropharyngeal erythema.   Cardiovascular:      Rate and Rhythm: Normal rate and regular rhythm.      Pulses: Normal pulses.      Heart sounds: Normal heart sounds. No murmur heard.     Pulmonary:      Effort: Pulmonary effort is normal. No respiratory distress.      Breath sounds: Normal breath sounds.   Chest:      Chest wall: No tenderness.   Abdominal:      General: Abdomen is flat. Bowel sounds are normal. There is no distension.      Palpations: Abdomen is soft. There is no mass.      Tenderness: There is no abdominal tenderness. There is no guarding.   Musculoskeletal:         General: No swelling or tenderness. Normal range of motion.      Cervical back: Normal range of motion and neck supple.   Skin:     General: Skin is warm and dry.      Findings: No rash.   Neurological:      General: No focal deficit present.      Mental Status: He is alert and oriented to person, place, and time. Mental status is at baseline.      Gait: Gait normal.   Psychiatric:         Mood and Affect: Mood normal.         Behavior: Behavior normal.         Thought Content: Thought content normal.         Judgment: Judgment normal.           Result Review :                           Assessment and Plan        Diagnoses and all orders for this visit:    1. Annual physical exam (Primary)    2. Essential hypertension  Assessment & Plan:  Stable on zestoretic 10/12/5mg bid    Orders:  -     lisinopril-hydrochlorothiazide (PRINZIDE,ZESTORETIC) 10-12.5 MG per tablet;  Take 1 tablet by mouth Daily.  Dispense: 90 tablet; Refill: 1    3. Mixed hyperlipidemia  Assessment & Plan:  Stable on lipitor 10mg q day    Orders:  -     Comprehensive metabolic panel; Future  -     Lipid panel; Future  -     atorvastatin (LIPITOR) 10 MG tablet; Take 1 tablet by mouth every night at bedtime.  Dispense: 90 tablet; Refill: 1  -     fenofibrate 160 MG tablet; Take 1 tablet by mouth Daily.  Dispense: 90 tablet; Refill: 1    Preventative Counseling:  Low chol diet and daily cardio exercise.  Adequate sleep.  Avoid illegal drugs and alcohol.        Follow Up     Return in about 6 months (around 2/3/2022).    Patient was given instructions and counseling regarding his condition or for health maintenance advice. Please see specific information pulled into the AVS if appropriate.       BRITTANY Will

## 2021-11-11 ENCOUNTER — PREP FOR SURGERY (OUTPATIENT)
Dept: OTHER | Facility: HOSPITAL | Age: 46
End: 2021-11-11

## 2021-11-11 ENCOUNTER — OFFICE VISIT (OUTPATIENT)
Dept: SURGERY | Facility: CLINIC | Age: 46
End: 2021-11-11

## 2021-11-11 VITALS — WEIGHT: 193.6 LBS | HEART RATE: 85 BPM | BODY MASS INDEX: 29.34 KG/M2 | HEIGHT: 68 IN

## 2021-11-11 DIAGNOSIS — Z12.11 SCREENING FOR MALIGNANT NEOPLASM OF COLON: Primary | ICD-10-CM

## 2021-11-11 DIAGNOSIS — Z86.010 HISTORY OF COLONIC POLYPS: ICD-10-CM

## 2021-11-11 DIAGNOSIS — D12.6 HIGH GRADE DYSPLASIA IN COLONIC ADENOMA: ICD-10-CM

## 2021-11-11 PROBLEM — Z86.0100 HISTORY OF COLONIC POLYPS: Status: ACTIVE | Noted: 2021-11-11

## 2021-11-11 PROCEDURE — S0285 CNSLT BEFORE SCREEN COLONOSC: HCPCS | Performed by: NURSE PRACTITIONER

## 2021-11-11 RX ORDER — SODIUM CHLORIDE 0.9 % (FLUSH) 0.9 %
10 SYRINGE (ML) INJECTION AS NEEDED
Status: CANCELLED | OUTPATIENT
Start: 2021-11-11

## 2021-11-11 RX ORDER — POLYETHYLENE GLYCOL 3350 17 G/17G
POWDER, FOR SOLUTION ORAL
Qty: 238 PACKET | Refills: 0 | Status: ON HOLD | OUTPATIENT
Start: 2021-11-11 | End: 2021-12-20

## 2021-11-11 RX ORDER — OMEPRAZOLE 40 MG/1
40 CAPSULE, DELAYED RELEASE ORAL DAILY
Qty: 30 CAPSULE | Refills: 5 | Status: SHIPPED | OUTPATIENT
Start: 2021-11-11 | End: 2022-02-02 | Stop reason: SDUPTHER

## 2021-11-11 RX ORDER — SODIUM CHLORIDE 0.9 % (FLUSH) 0.9 %
3 SYRINGE (ML) INJECTION EVERY 12 HOURS SCHEDULED
Status: CANCELLED | OUTPATIENT
Start: 2021-11-11

## 2021-11-11 NOTE — PROGRESS NOTES
Chief Complaint: Follow-up (Pt here for a 1 yr colon consult )    Subjective      Colonoscopy consultation       History of Present Illness  Agustin Stubbs is a 45 y.o. male presents to [facility] for colonoscopy consultation.    Patient presents today without complaints for screening colonoscopy.    He denies any abdominal pain, change in bowel habit, rectal bleeding.    Admits to family history of colon cancer with 3 of his maternal uncles.    11/20: EGD & Colonoscopy ([facility]): Esophagitis; Ascending - tubular adenoma; Sigmoid - tubular adenoma with high grade dysplasia.       Objective     Past Medical History:   Diagnosis Date   • Chronic allergic rhinitis    • High blood pressure    • Hyperlipemia    • Hypertension    • No pertinent past medical history        History reviewed. No pertinent surgical history.      Current Outpatient Medications:   •  atorvastatin (LIPITOR) 10 MG tablet, Take 1 tablet by mouth every night at bedtime., Disp: 90 tablet, Rfl: 1  •  fenofibrate 160 MG tablet, Take 1 tablet by mouth Daily., Disp: 90 tablet, Rfl: 1  •  lisinopril-hydrochlorothiazide (PRINZIDE,ZESTORETIC) 10-12.5 MG per tablet, Take 1 tablet by mouth Daily., Disp: 90 tablet, Rfl: 1  •  omeprazole (priLOSEC) 40 MG capsule, Take 1 capsule by mouth Daily., Disp: 30 capsule, Rfl: 5  •  polyethylene glycol (MIRALAX) 17 g packet, Take as directed.  Instructions given in office.  Dispense: 238 g bottle, Disp: 238 packet, Rfl: 0    Allergies   Allergen Reactions   • Penicillins Unknown - High Severity        Family History   Problem Relation Age of Onset   • Cancer Mother    • Diabetes Mother    • Other Father         RENAL CALCULUS   • Diabetes Father    • Cancer Brother    • Breast cancer Other 30        AUNT   • Lung cancer Other         UNCLE       Social History     Socioeconomic History   • Marital status:    Tobacco Use   • Smoking status: Current Every Day Smoker      "Packs/day: 1.50   • Smokeless tobacco: Never Used   • Tobacco comment: CURRENT EVERY DAY SMOKER, SMOKED 21-30 YEARS   Vaping Use   • Vaping Use: Never used   Substance and Sexual Activity   • Alcohol use: Yes     Comment: 4 DRINKS PER DAY, HAS BEEN DRINKING FOR 21-30 YEARS   • Drug use: Never       Review of Systems   Constitutional: Negative for chills and fever.   Gastrointestinal: Negative for abdominal distention, abdominal pain, anal bleeding, blood in stool, constipation, diarrhea and rectal pain.        Vital Signs:   Pulse 85   Ht 172.7 cm (68\")   Wt 87.8 kg (193 lb 9.6 oz)   BMI 29.44 kg/m²      Physical Exam  Constitutional:       Appearance: Normal appearance.   HENT:      Head: Normocephalic.   Cardiovascular:      Rate and Rhythm: Normal rate.   Pulmonary:      Effort: Pulmonary effort is normal.   Abdominal:      General: Abdomen is flat.      Palpations: Abdomen is soft.   Skin:     General: Skin is warm and dry.   Neurological:      General: No focal deficit present.      Mental Status: He is alert and oriented to person, place, and time.   Psychiatric:         Mood and Affect: Mood normal.         Thought Content: Thought content normal.          Result Review :              []  Laboratory  []  Radiology  [x]  Pathology  []  Microbiology  []  EKG/Telemetry   []  Cardiology/Vascular   [x]  Old records  Today I have reviewed Dr. Perla's previous operative and pathology reports.     Assessment and Plan    Diagnoses and all orders for this visit:    1. Screening for malignant neoplasm of colon (Primary)    2. History of colonic polyps    3. High grade dysplasia in colonic adenoma    Other orders  -     polyethylene glycol (MIRALAX) 17 g packet; Take as directed.  Instructions given in office.  Dispense: 238 g bottle  Dispense: 238 packet; Refill: 0  -     omeprazole (priLOSEC) 40 MG capsule; Take 1 capsule by mouth Daily.  Dispense: 30 capsule; Refill: 5    (Orange prep)    Follow Up   Return for " Scheduled colonoscopy with Dr. Perla on 12/20 at [facility].     Hospital arrival time 10:30 AM    Possible risk/complications, benefits, and alternatives to surgical or invasive procedure have been explained to patient and/or legal guardian.  Patient has been evaluated and can tolerate anesthesia and/or sedation. Risks, benefits, and alternatives to anesthesia and sedation have been explained to patient and/or legal guardian.  Patient verbalizes understanding and is willing to proceed with the above plan.    Patient was given instructions and counseling regarding his condition or for health maintenance advice. Please see specific information pulled into the AVS if appropriate.

## 2021-12-16 DIAGNOSIS — I10 ESSENTIAL HYPERTENSION: ICD-10-CM

## 2021-12-16 DIAGNOSIS — E78.2 MIXED HYPERLIPIDEMIA: ICD-10-CM

## 2021-12-16 RX ORDER — FENOFIBRATE 160 MG/1
160 TABLET ORAL DAILY
Qty: 90 TABLET | Refills: 1 | Status: SHIPPED | OUTPATIENT
Start: 2021-12-16 | End: 2022-10-21

## 2021-12-16 RX ORDER — ATORVASTATIN CALCIUM 10 MG/1
10 TABLET, FILM COATED ORAL
Qty: 90 TABLET | Refills: 1 | Status: SHIPPED | OUTPATIENT
Start: 2021-12-16 | End: 2022-10-21

## 2021-12-16 RX ORDER — LISINOPRIL AND HYDROCHLOROTHIAZIDE 12.5; 1 MG/1; MG/1
1 TABLET ORAL DAILY
Qty: 90 TABLET | Refills: 1 | Status: SHIPPED | OUTPATIENT
Start: 2021-12-16 | End: 2023-01-23

## 2021-12-16 RX ORDER — MULTIPLE VITAMINS W/ MINERALS TAB 9MG-400MCG
1 TAB ORAL DAILY
COMMUNITY

## 2021-12-16 NOTE — TELEPHONE ENCOUNTER
Caller: Agustin Stubbs    Relationship: Self    Best call back number: [phone]    Requested Prescriptions: PATIENT DID NOT KNOW THE NAMES AND WAS NOT AT HOME. HE STATES THAT 1 OF THEM IS FOR BLOOD PRESSURE AND IT'S A BLUE PILL SHAPED LIKE A STOP SIGN AND THE OTHER 2 ARE FOR CHOLESTEROL. PATIENT IS ALSO NEEDING HIS omeprazole (priLOSEC) 40 MG capsule  Requested Prescriptions      No prescriptions requested or ordered in this encounter        Pharmacy where request should be sent:      [facility] [address] [facility] [address] - [phone]  - [phone] FX  [phone]      Additional details provided by patient: TOTALLY OUT      Does the patient have less than a 3 day supply:  [x] Yes  [] No    Elba Lora Rep   12/16/21 10:47 EST

## 2021-12-16 NOTE — PRE-PROCEDURE INSTRUCTIONS
Called patient and discussed instructions for laxative and skin prep. Discussed clear liquid diet and pre-op medications. Patient aware he can take lipitor, fenofibrate, and prilosec. Patient stated understanding. No other issues or concerns noted currently per patient.

## 2021-12-20 ENCOUNTER — HOSPITAL ENCOUNTER (OUTPATIENT)
Facility: HOSPITAL | Age: 46
Setting detail: HOSPITAL OUTPATIENT SURGERY
Discharge: HOME | End: 2021-12-20
Attending: SURGERY | Admitting: SURGERY

## 2021-12-20 ENCOUNTER — ANESTHESIA (OUTPATIENT)
Dept: GASTROENTEROLOGY | Facility: HOSPITAL | Age: 46
End: 2021-12-20

## 2021-12-20 ENCOUNTER — ANESTHESIA EVENT (OUTPATIENT)
Dept: GASTROENTEROLOGY | Facility: HOSPITAL | Age: 46
End: 2021-12-20

## 2021-12-20 VITALS
TEMPERATURE: 98.6 F | WEIGHT: 195.77 LBS | SYSTOLIC BLOOD PRESSURE: 153 MMHG | RESPIRATION RATE: 16 BRPM | HEART RATE: 86 BPM | DIASTOLIC BLOOD PRESSURE: 107 MMHG | OXYGEN SATURATION: 99 % | BODY MASS INDEX: 29.67 KG/M2 | HEIGHT: 68 IN

## 2021-12-20 DIAGNOSIS — Z86.010 HISTORY OF COLONIC POLYPS: ICD-10-CM

## 2021-12-20 DIAGNOSIS — Z12.11 SCREENING FOR MALIGNANT NEOPLASM OF COLON: ICD-10-CM

## 2021-12-20 PROCEDURE — 25010000002 PROPOFOL 10 MG/ML EMULSION: Performed by: NURSE ANESTHETIST, CERTIFIED REGISTERED

## 2021-12-20 PROCEDURE — 88305 TISSUE EXAM BY PATHOLOGIST: CPT | Performed by: SURGERY

## 2021-12-20 RX ORDER — SODIUM CHLORIDE 0.9 % (FLUSH) 0.9 %
10 SYRINGE (ML) INJECTION AS NEEDED
Status: DISCONTINUED | OUTPATIENT
Start: 2021-12-20 | End: 2021-12-20 | Stop reason: HOSPADM

## 2021-12-20 RX ORDER — SODIUM CHLORIDE, SODIUM LACTATE, POTASSIUM CHLORIDE, CALCIUM CHLORIDE 600; 310; 30; 20 MG/100ML; MG/100ML; MG/100ML; MG/100ML
30 INJECTION, SOLUTION INTRAVENOUS CONTINUOUS
Status: DISCONTINUED | OUTPATIENT
Start: 2021-12-20 | End: 2021-12-20 | Stop reason: HOSPADM

## 2021-12-20 RX ORDER — LIDOCAINE HYDROCHLORIDE 20 MG/ML
INJECTION, SOLUTION INFILTRATION; PERINEURAL AS NEEDED
Status: DISCONTINUED | OUTPATIENT
Start: 2021-12-20 | End: 2021-12-20 | Stop reason: SURG

## 2021-12-20 RX ORDER — ONDANSETRON 4 MG/1
4 TABLET, FILM COATED ORAL ONCE AS NEEDED
Status: DISCONTINUED | OUTPATIENT
Start: 2021-12-20 | End: 2021-12-20 | Stop reason: HOSPADM

## 2021-12-20 RX ORDER — ONDANSETRON 2 MG/ML
4 INJECTION INTRAMUSCULAR; INTRAVENOUS ONCE AS NEEDED
Status: DISCONTINUED | OUTPATIENT
Start: 2021-12-20 | End: 2021-12-20 | Stop reason: HOSPADM

## 2021-12-20 RX ORDER — SODIUM CHLORIDE 0.9 % (FLUSH) 0.9 %
3 SYRINGE (ML) INJECTION EVERY 12 HOURS SCHEDULED
Status: DISCONTINUED | OUTPATIENT
Start: 2021-12-20 | End: 2021-12-20 | Stop reason: HOSPADM

## 2021-12-20 RX ADMIN — PROPOFOL 225 MCG/KG/MIN: 10 INJECTION, EMULSION INTRAVENOUS at 11:56

## 2021-12-20 RX ADMIN — LIDOCAINE HYDROCHLORIDE 100 MG: 20 INJECTION, SOLUTION INFILTRATION; PERINEURAL at 11:56

## 2021-12-20 NOTE — ANESTHESIA PREPROCEDURE EVALUATION
Anesthesia Evaluation     Patient summary reviewed and Nursing notes reviewed   no history of anesthetic complications:  NPO Solid Status: > 8 hours  NPO Liquid Status: > 2 hours           Airway   Mallampati: II  TM distance: >3 FB  Neck ROM: full  No difficulty expected  Dental      Pulmonary - negative pulmonary ROS and normal exam    breath sounds clear to auscultation  Cardiovascular - normal exam  Exercise tolerance: good (4-7 METS)    Rhythm: regular    (+) hypertension, hyperlipidemia,       Neuro/Psych- negative ROS  GI/Hepatic/Renal/Endo - negative ROS     Musculoskeletal (-) negative ROS    Abdominal    Substance History - negative use     OB/GYN negative ob/gyn ROS         Other - negative ROS                       Anesthesia Plan    ASA 2     general   (Total IV Anesthesia    Patient understands anesthesia not responsible for dental damage.  )  intravenous induction     Anesthetic plan, all risks, benefits, and alternatives have been provided, discussed and informed consent has been obtained with: patient.    Plan discussed with CRNA.

## 2021-12-20 NOTE — DISCHARGE INSTRUCTIONS
Colon Polyps    Colon polyps are tissue growths inside the colon, which is part of the large intestine. They are one of the types of polyps that can grow in the body. A polyp may be a round bump or a mushroom-shaped growth. You could have one polyp or more than one.  Most colon polyps are noncancerous (benign). However, some colon polyps can become cancerous over time. Finding and removing the polyps early can help prevent this.  What are the causes?  The exact cause of colon polyps is not known.  What increases the risk?  The following factors may make you more likely to develop this condition:  · Having a family history of colorectal cancer or colon polyps.  · Being older than 45 years of age.  · Being younger than 45 years of age and having a significant family history of colorectal cancer or colon polyps or a genetic condition that puts you at higher risk of getting colon polyps.  · Having inflammatory bowel disease, such as ulcerative colitis or Crohn's disease.  · Having certain conditions passed from parent to child (hereditary conditions), such as:  ? Familial adenomatous polyposis (FAP).  ? Mak syndrome.  ? Turcot syndrome.  ? Peutz-Jeghers syndrome.  ? MUTYH-associated polyposis (MAP).  · Being overweight.  · Certain lifestyle factors. These include smoking cigarettes, drinking too much alcohol, not getting enough exercise, and eating a diet that is high in fat and red meat and low in fiber.  · Having had childhood cancer that was treated with radiation of the abdomen.  What are the signs or symptoms?  Many times, there are no symptoms.  If you have symptoms, they may include:  · Blood coming from the rectum during a bowel movement.  · Blood in the stool (feces). The blood may be bright red or very dark in color.  · Pain in the abdomen.  · A change in bowel habits, such as constipation or diarrhea.  How is this diagnosed?  This condition is diagnosed with a colonoscopy. This is a procedure in which a  lighted, flexible scope is inserted into the opening between the buttocks (anus) and then passed into the colon to examine the area. Polyps are sometimes found when a colonoscopy is done as part of routine cancer screening tests.  How is this treated?  This condition is treated by removing any polyps that are found. Most polyps can be removed during a colonoscopy. Those polyps will then be tested for cancer. Additional treatment may be needed depending on the results of testing.  Follow these instructions at home:  Eating and drinking    · Eat foods that are high in fiber, such as fruits, vegetables, and whole grains.  · Eat foods that are high in calcium and vitamin D, such as milk, cheese, yogurt, eggs, liver, fish, and broccoli.  · Limit foods that are high in fat, such as fried foods and desserts.  · Limit the amount of red meat, precooked or cured meat, or other processed meat that you eat, such as hot dogs, sausages, smith, or meat loaves.  · Limit sugary drinks.    Lifestyle  · Maintain a healthy weight, or lose weight if recommended by your health care provider.  · Exercise every day or as told by your health care provider.  · Do not use any products that contain nicotine or tobacco, such as cigarettes, e-cigarettes, and chewing tobacco. If you need help quitting, ask your health care provider.  · Do not drink alcohol if:  ? Your health care provider tells you not to drink.  ? You are pregnant, may be pregnant, or are planning to become pregnant.  · If you drink alcohol:  ? Limit how much you use to:  § 0-1 drink a day for women.  § 0-2 drinks a day for men.  ? Know how much alcohol is in your drink. In the [place] one drink equals one 12 oz bottle of beer (355 mL), one 5 oz glass of wine (148 mL), or one 1½ oz glass of hard liquor (44 mL).  General instructions  · Take over-the-counter and prescription medicines only as told by your health care provider.  · Keep all follow-up visits. This is important. This  includes having regularly scheduled colonoscopies. Talk to your health care provider about when you need a colonoscopy.  Contact a health care provider if:  · You have new or worsening bleeding during a bowel movement.  · You have new or increased blood in your stool.  · You have a change in bowel habits.  · You lose weight for no known reason.  Summary  · Colon polyps are tissue growths inside the colon, which is part of the large intestine. They are one type of polyp that can grow in the body.  · Most colon polyps are noncancerous (benign), but some can become cancerous over time.  · This condition is diagnosed with a colonoscopy.  · This condition is treated by removing any polyps that are found. Most polyps can be removed during a colonoscopy.  This information is not intended to replace advice given to you by your health care provider. Make sure you discuss any questions you have with your health care provider.  Document Revised: 04/07/2021 Document Reviewed: 04/07/2021  Elsevier Patient Education © 2021 [employer]

## 2021-12-20 NOTE — ANESTHESIA POSTPROCEDURE EVALUATION
Patient: Agustin Stubbs    Procedure Summary     Date: 12/20/21 Room / Location: [facility] END[facility]COPY 3 / [facility]    Anesthesia Start: 1156 Anesthesia Stop: 1215    Procedure: COLONOSCOPY with possible biopies. (N/A ) Diagnosis:       Screening for malignant neoplasm of colon      History of colonic polyps      (Screening for malignant neoplasm of colon [Z12.11])      (History of colonic polyps [Z86.010])    Surgeons: Aaron Perla MD Provider: Griffin Cardozo MD    Anesthesia Type: general ASA Status: 2          Anesthesia Type: general    Vitals  Vitals Value Taken Time   /103 12/20/21 1229   Temp 37 °C (98.6 °F) 12/20/21 1218   Pulse 88 12/20/21 1229   Resp 20 12/20/21 1225   SpO2 100 % 12/20/21 1229   Vitals shown include unvalidated device data.        Post Anesthesia Care and Evaluation    Patient location during evaluation: bedside  Patient participation: complete - patient participated  Level of consciousness: awake  Pain score: 0  Pain management: adequate  Airway patency: patent  Anesthetic complications: No anesthetic complications  PONV Status: none  Cardiovascular status: acceptable and stable  Respiratory status: acceptable and room air  Hydration status: acceptable    Comments: An Anesthesiologist personally participated in the most demanding procedures (including induction and emergence if applicable) in the anesthesia plan, monitored the course of anesthesia administration at frequent intervals and remained physically present and available for immediate diagnosis and treatment of emergencies.

## 2021-12-20 NOTE — H&P
[facility]   HISTORY AND PHYSICAL    Patient Name: Agustin Stubbs  : 1975  MRN: [id number]  Primary Care Physician:  Indy Moffett APRN  Date of admission: 2021    Subjective   Subjective     Chief Complaint: Colon screening    HPI:    Agustin Stubbs is a 46 y.o. male who came back for short interval colon screening.  He had a colonoscopy last year that revealed an adenoma with high-grade dysplasia in the sigmoid colon.    Review of Systems   Respiratory: Negative for shortness of breath.    Cardiovascular: Negative for chest pain.       Personal History     Past Medical History:   Diagnosis Date   • Chronic allergic rhinitis    • High blood pressure    • Hyperlipemia    • Hypertension    • No pertinent past medical history        Past Surgical History:   Procedure Laterality Date   • COLONOSCOPY         Family History: family history includes Breast cancer (age of onset: 30) in an other family member; Cancer in his brother and mother; Diabetes in his father and mother; Lung cancer in an other family member; Other in his father. Otherwise pertinent FHx was reviewed and not pertinent to current issue.    Social History:  reports that he has been smoking cigarettes. He has been smoking about 1.50 packs per day. He has never used smokeless tobacco. He reports current alcohol use. He reports previous drug use.    Home Medications:  Lactobacillus, atorvastatin, fenofibrate, lisinopril-hydrochlorothiazide, multivitamin with minerals, omeprazole, and polyethylene glycol    Allergies:  Allergies   Allergen Reactions   • Penicillins Unknown - High Severity       Objective    Objective     Vitals:        Physical Exam  HENT:      Head: Normocephalic.   Cardiovascular:      Rate and Rhythm: Normal rate.   Pulmonary:      Effort: Pulmonary effort is normal.   Abdominal:      Palpations: Abdomen is soft.   Musculoskeletal:         General: Normal range of motion.      Cervical back: Normal range of  motion.   Skin:     General: Skin is warm.   Neurological:      General: No focal deficit present.      Mental Status: He is alert.   Psychiatric:         Mood and Affect: Mood normal.         Result Review    Result Review:  I have personally reviewed the results from the time of this admission to 12/20/2021 11:07 EST and agree with these findings:  []  Laboratory  []  Microbiology  []  Radiology  []  EKG/Telemetry   []  Cardiology/Vascular   []  Pathology  []  Old records  []  Other:  Most notable findings include:     Assessment/Plan   Assessment / Plan     Brief Patient Summary:  Agustin Stubbs is a 46 y.o. male who presents for colon screening.  He had a colonoscopy with a polypectomy last year and the polyp that had high-grade dysplasia.    Active Hospital Problems:  Active Hospital Problems    Diagnosis    • Screening for malignant neoplasm of colon      Added automatically from request for surgery [id number]     • History of colonic polyps      Added automatically from request for surgery [id number]         Plan:   We will proceed with a colonoscopy.  Risk benefits and alternatives were explained.    DVT prophylaxis:  No DVT prophylaxis order currently exists.    CODE STATUS:         Admission Status:  I believe this patient meets outpatient status.    Electronically signed by Aaron Perla MD, 12/20/21, 11:07 AM EST.

## 2021-12-21 LAB
CYTO UR: NORMAL
LAB AP CASE REPORT: NORMAL
LAB AP CLINICAL INFORMATION: NORMAL
PATH REPORT.FINAL DX SPEC: NORMAL
PATH REPORT.GROSS SPEC: NORMAL

## 2022-01-03 ENCOUNTER — OFFICE VISIT (OUTPATIENT)
Dept: SURGERY | Facility: CLINIC | Age: 47
End: 2022-01-03

## 2022-01-03 VITALS — RESPIRATION RATE: 18 BRPM | HEIGHT: 68 IN | WEIGHT: 195.77 LBS | BODY MASS INDEX: 29.67 KG/M2

## 2022-01-03 DIAGNOSIS — Z12.11 SCREENING FOR MALIGNANT NEOPLASM OF COLON: Primary | ICD-10-CM

## 2022-01-03 PROCEDURE — 99212 OFFICE O/P EST SF 10 MIN: CPT | Performed by: SURGERY

## 2022-01-03 NOTE — PROGRESS NOTES
"Chief Complaint  Follow-up    Subjective          Agustin Stubbs presents to [facility]  History of Present Illness    Agustin Stubbs is a 46 y.o. male  who presents today for a postoperative visit.     Patient is here for a follow-up after a screening colonoscopy.  We removed 2 small polyps from the colon the both came back consistent with benign tubular adenomas.    Past History:  Medical History: has a past medical history of Chronic allergic rhinitis, High blood pressure, Hyperlipemia, Hypertension, and No pertinent past medical history.   Surgical History: has a past surgical history that includes Colonoscopy and Colonoscopy (N/A, 12/20/2021).   Family History: family history includes Breast cancer (age of onset: 30) in an other family member; Cancer in his brother and mother; Diabetes in his father and mother; Lung cancer in an other family member; Other in his father.   Social History: reports that he has been smoking cigarettes. He has been smoking about 1.50 packs per day. He has never used smokeless tobacco. He reports current alcohol use. He reports previous drug use.  Allergies: Penicillins       Current Outpatient Medications:   •  atorvastatin (LIPITOR) 10 MG tablet, Take 1 tablet by mouth every night at bedtime., Disp: 90 tablet, Rfl: 1  •  fenofibrate 160 MG tablet, Take 1 tablet by mouth Daily., Disp: 90 tablet, Rfl: 1  •  Lactobacillus (PROBIOTIC ACIDOPHILUS PO), Take  by mouth., Disp: , Rfl:   •  lisinopril-hydrochlorothiazide (PRINZIDE,ZESTORETIC) 10-12.5 MG per tablet, Take 1 tablet by mouth Daily., Disp: 90 tablet, Rfl: 1  •  multivitamin with minerals tablet tablet, Take 1 tablet by mouth Daily., Disp: , Rfl:   •  omeprazole (priLOSEC) 40 MG capsule, Take 1 capsule by mouth Daily., Disp: 30 capsule, Rfl: 5       Physical Exam  He appears well his abdomen is soft  Objective     Vital Signs:   Resp 18   Ht 172.7 cm (67.99\")   Wt 88.8 kg (195 lb 12.3 oz)   " BMI 29.77 kg/m²              Assessment and Plan    Diagnoses and all orders for this visit:    1. Screening for malignant neoplasm of colon (Primary)    I have recommended that he repeat this in 5 years.  I will see him back otherwise on an as-needed basis.

## 2022-02-02 ENCOUNTER — OFFICE VISIT (OUTPATIENT)
Dept: FAMILY MEDICINE CLINIC | Facility: CLINIC | Age: 47
End: 2022-02-02

## 2022-02-02 VITALS
SYSTOLIC BLOOD PRESSURE: 135 MMHG | DIASTOLIC BLOOD PRESSURE: 82 MMHG | OXYGEN SATURATION: 98 % | HEART RATE: 95 BPM | WEIGHT: 202 LBS | HEIGHT: 68 IN | BODY MASS INDEX: 30.62 KG/M2

## 2022-02-02 DIAGNOSIS — E78.2 MIXED HYPERLIPIDEMIA: Primary | ICD-10-CM

## 2022-02-02 DIAGNOSIS — Z13.29 SCREENING FOR THYROID DISORDER: ICD-10-CM

## 2022-02-02 DIAGNOSIS — K21.9 GASTROESOPHAGEAL REFLUX DISEASE WITHOUT ESOPHAGITIS: ICD-10-CM

## 2022-02-02 DIAGNOSIS — I10 ESSENTIAL HYPERTENSION: ICD-10-CM

## 2022-02-02 PROCEDURE — 99214 OFFICE O/P EST MOD 30 MIN: CPT | Performed by: NURSE PRACTITIONER

## 2022-02-02 RX ORDER — OMEPRAZOLE 40 MG/1
40 CAPSULE, DELAYED RELEASE ORAL DAILY
Qty: 90 CAPSULE | Refills: 1 | Status: SHIPPED | OUTPATIENT
Start: 2022-02-02

## 2022-02-02 NOTE — PROGRESS NOTES
Chief Complaint  Hyperlipidemia, Hypertension, and Heartburn    Subjective            Agustin Stubbs presents to [facility]  Pt here for 6 month f/u on HTN, HLD, and GERD. Pt is stable on all medications with no concerns.    Pt had colonoscopy on 12/20/21 by Dr. Perla, repeat in 5 years.     Pt due labs.                PMH  Past Medical History:   Diagnosis Date   • Chronic allergic rhinitis    • High blood pressure    • Hyperlipemia    • Hypertension    • No pertinent past medical history        ALLERGY  Allergies   Allergen Reactions   • Penicillins Unknown - High Severity        SURGICALHX  Past Surgical History:   Procedure Laterality Date   • COLONOSCOPY     • COLONOSCOPY N/A 12/20/2021    Procedure: COLONOSCOPY with possible biopies.;  Surgeon: Aaron Perla MD;  Location: [facility] ENDOSCOPY;  Service: General;  Laterality: N/A;  COLON POLYPS        SOCX  Social History     Tobacco Use   • Smoking status: Current Every Day Smoker     Packs/day: 1.50     Types: Cigarettes   • Smokeless tobacco: Never Used   • Tobacco comment: CURRENT EVERY DAY SMOKER, SMOKED 21-30 YEARS   Substance Use Topics   • Alcohol use: Yes     Comment: 4 DRINKS PER DAY, HAS BEEN DRINKING FOR 21-30 YEARS       FAMHX  Family History   Problem Relation Age of Onset   • Cancer Mother    • Diabetes Mother    • Other Father         RENAL CALCULUS   • Diabetes Father    • Cancer Brother    • Breast cancer Other 30        AUNT   • Lung cancer Other         UNCLE   • Malig Hyperthermia Neg Hx         MEDSIGONLY  Current Outpatient Medications on File Prior to Visit   Medication Sig   • atorvastatin (LIPITOR) 10 MG tablet Take 1 tablet by mouth every night at bedtime.   • fenofibrate 160 MG tablet Take 1 tablet by mouth Daily.   • Lactobacillus (PROBIOTIC ACIDOPHILUS PO) Take  by mouth.   • lisinopril-hydrochlorothiazide (PRINZIDE,ZESTORETIC) 10-12.5 MG per tablet Take 1 tablet by mouth Daily.   •  "multivitamin with minerals tablet tablet Take 1 tablet by mouth Daily.   • [DISCONTINUED] omeprazole (priLOSEC) 40 MG capsule Take 1 capsule by mouth Daily.     No current facility-administered medications on file prior to visit.       Health Maintenance Due   Topic Date Due   • Pneumococcal Vaccine 0-64 (1 of 2 - PPSV23) Never done   • TDAP/TD VACCINES (1 - Tdap) Never done   • HEPATITIS C SCREENING  Never done       Objective     /82   Pulse 95   Ht 172.7 cm (68\")   Wt 91.6 kg (202 lb)   SpO2 98%   BMI 30.71 kg/m²       Physical Exam  Constitutional:       General: He is not in acute distress.     Appearance: Normal appearance. He is not ill-appearing.   HENT:      Head: Normocephalic and atraumatic.      Mouth/Throat:      Pharynx: No oropharyngeal exudate or posterior oropharyngeal erythema.   Cardiovascular:      Rate and Rhythm: Normal rate and regular rhythm.      Heart sounds: Normal heart sounds. No murmur heard.      Pulmonary:      Effort: Pulmonary effort is normal. No respiratory distress.      Breath sounds: Normal breath sounds.   Chest:      Chest wall: No tenderness.   Abdominal:      General: Abdomen is flat. Bowel sounds are normal. There is no distension.      Palpations: Abdomen is soft. There is no mass.      Tenderness: There is no abdominal tenderness. There is no guarding.   Musculoskeletal:         General: No swelling or tenderness. Normal range of motion.      Cervical back: Normal range of motion and neck supple.   Skin:     General: Skin is warm and dry.      Findings: No rash.   Neurological:      General: No focal deficit present.      Mental Status: He is alert and oriented to person, place, and time. Mental status is at baseline.      Gait: Gait normal.   Psychiatric:         Mood and Affect: Mood normal.         Behavior: Behavior normal.         Thought Content: Thought content normal.         Judgment: Judgment normal.           Result Review :                         "   Assessment and Plan        Diagnoses and all orders for this visit:    1. Mixed hyperlipidemia (Primary)  Comments:  stable on lipitor 10mg and fenofibrate 160mg, continue  Orders:  -     Comprehensive metabolic panel; Future  -     Lipid panel; Future    2. Essential hypertension  Comments:  stable on zestoretic 10/12.5mg continue  Orders:  -     CBC w AUTO Differential; Future  -     Comprehensive metabolic panel; Future    3. Screening for thyroid disorder  -     TSH; Future  -     T4, free; Future    4. Gastroesophageal reflux disease without esophagitis  Comments:  stable on prilosec 40mg, continue  Orders:  -     omeprazole (priLOSEC) 40 MG capsule; Take 1 capsule by mouth Daily.  Dispense: 90 capsule; Refill: 1              Follow Up     Return in about 6 months (around 8/2/2022).    Patient was given instructions and counseling regarding his condition or for health maintenance advice. Please see specific information pulled into the AVS if appropriate.     Agustin Stubbs  reports that he has been smoking cigarettes. He has been smoking about 1.50 packs per day. He has never used smokeless tobacco..

## 2022-03-14 ENCOUNTER — OFFICE VISIT (OUTPATIENT)
Dept: ORTHOPEDIC SURGERY | Facility: CLINIC | Age: 47
End: 2022-03-14

## 2022-03-14 VITALS — OXYGEN SATURATION: 97 % | BODY MASS INDEX: 31.07 KG/M2 | HEIGHT: 68 IN | HEART RATE: 74 BPM | WEIGHT: 205 LBS

## 2022-03-14 DIAGNOSIS — M79.601 BILATERAL ARM PAIN: Primary | ICD-10-CM

## 2022-03-14 DIAGNOSIS — M79.602 BILATERAL ARM PAIN: Primary | ICD-10-CM

## 2022-03-14 PROCEDURE — 99213 OFFICE O/P EST LOW 20 MIN: CPT | Performed by: ORTHOPAEDIC SURGERY

## 2022-03-14 RX ORDER — DICLOFENAC SODIUM 75 MG/1
75 TABLET, DELAYED RELEASE ORAL 2 TIMES DAILY
Qty: 60 TABLET | Refills: 0 | Status: SHIPPED | OUTPATIENT
Start: 2022-03-14 | End: 2022-04-13

## 2022-03-14 NOTE — PROGRESS NOTES
"Chief Complaint  Initial Evaluation of the Right Arm and Initial Evaluation of the Left Arm     Subjective      Agustin Stubbs presents to [facility] for an evaluation of bilateral arms. He states about about the biceps attachment. He notices pain with flexion of the left elbow. He states no pain in the arms but discomfort. His right forearm causes pain after a days work. Sometimes pain would get to the point it itches.     Allergies   Allergen Reactions   • Penicillins Unknown - High Severity        Social History     Socioeconomic History   • Marital status:    Tobacco Use   • Smoking status: Current Every Day Smoker     Packs/day: 1.50     Types: Cigarettes   • Smokeless tobacco: Never Used   • Tobacco comment: CURRENT EVERY DAY SMOKER, SMOKED 21-30 YEARS   Vaping Use   • Vaping Use: Never used   Substance and Sexual Activity   • Alcohol use: Yes     Comment: 4 DRINKS PER DAY, HAS BEEN DRINKING FOR 21-30 YEARS   • Drug use: Not Currently     Comment: \"Long time ago party drugs\"   • Sexual activity: Defer        Review of Systems     Objective   Vital Signs:   Pulse 74   Ht 172.7 cm (68\")   Wt 93 kg (205 lb)   SpO2 97%   BMI 31.17 kg/m²       Physical Exam  Constitutional:       Appearance: Normal appearance. Patient is well-developed and normal weight.   HENT:      Head: Normocephalic.      Right Ear: Hearing and external ear normal.      Left Ear: Hearing and external ear normal.      Nose: Nose normal.   Eyes:      Conjunctiva/sclera: Conjunctivae normal.   Cardiovascular:      Rate and Rhythm: Normal rate.   Pulmonary:      Effort: Pulmonary effort is normal.      Breath sounds: No wheezing or rales.   Abdominal:      Palpations: Abdomen is soft.      Tenderness: There is no abdominal tenderness.   Musculoskeletal:      Cervical back: Normal range of motion.   Skin:     Findings: No rash.   Neurological:      Mental Status: Patient  is alert and oriented to person, " place, and time.   Psychiatric:         Mood and Affect: Mood and affect normal.         Judgment: Judgment normal.       Ortho Exam      RIGHT ARM: Good tone of deltoid, biceps, triceps, wrist extensors, and wrist flexors.  No swelling, skin discoloration or atrophy. Non-tender. Full elbow flexion and extension. Good strength. No pain with supination and pronation.     LEFT ARM: Mild biceps tenderness. No swelling, skin discoloration or atrophy. Good tone of deltoid, biceps, triceps, wrist extensors, and wrist flexors.  Full elbow flexion and extension. Mild pain with elbow flexion.       Procedures        Imaging Results (Most Recent)     None           Result Review :         No results found.           Assessment and Plan     DX: Bilateral arm pain     Discussed treatment plans and diagnosis with the patient. Anti-inflammatory prescribed. We will see how he does with this.     Call or return if worsening symptoms.    Follow Up     PRN.       Patient was given instructions and counseling regarding his condition or for health maintenance advice. Please see specific information pulled into the AVS if appropriate.     Scribed for Conrado Rasmussen MD by Lindsay Robert.  03/14/22   16:08 EDT    I have personally performed the services described in this document as scribed by the above individual and it is both accurate and complete. Conrado Rasmussen MD 03/14/22

## 2022-04-06 ENCOUNTER — LAB (OUTPATIENT)
Dept: LAB | Facility: HOSPITAL | Age: 47
End: 2022-04-06

## 2022-04-06 DIAGNOSIS — E78.2 MIXED HYPERLIPIDEMIA: ICD-10-CM

## 2022-04-06 DIAGNOSIS — Z13.29 SCREENING FOR THYROID DISORDER: ICD-10-CM

## 2022-04-06 DIAGNOSIS — I10 ESSENTIAL HYPERTENSION: ICD-10-CM

## 2022-04-06 LAB
ALBUMIN SERPL-MCNC: 4.8 G/DL (ref 3.5–5.2)
ALBUMIN/GLOB SERPL: 2.1 G/DL
ALP SERPL-CCNC: 56 U/L (ref 39–117)
ALT SERPL W P-5'-P-CCNC: 35 U/L (ref 1–41)
ANION GAP SERPL CALCULATED.3IONS-SCNC: 11.4 MMOL/L (ref 5–15)
AST SERPL-CCNC: 28 U/L (ref 1–40)
BASOPHILS # BLD AUTO: 0.06 10*3/MM3 (ref 0–0.2)
BASOPHILS NFR BLD AUTO: 1 % (ref 0–1.5)
BILIRUB SERPL-MCNC: 0.7 MG/DL (ref 0–1.2)
BUN SERPL-MCNC: 12 MG/DL (ref 6–20)
BUN/CREAT SERPL: 13 (ref 7–25)
CALCIUM SPEC-SCNC: 9.5 MG/DL (ref 8.6–10.5)
CHLORIDE SERPL-SCNC: 100 MMOL/L (ref 98–107)
CHOLEST SERPL-MCNC: 184 MG/DL (ref 0–200)
CO2 SERPL-SCNC: 26.6 MMOL/L (ref 22–29)
CREAT SERPL-MCNC: 0.92 MG/DL (ref 0.76–1.27)
DEPRECATED RDW RBC AUTO: 47.8 FL (ref 37–54)
EGFRCR SERPLBLD CKD-EPI 2021: 103.2 ML/MIN/1.73
EOSINOPHIL # BLD AUTO: 0.21 10*3/MM3 (ref 0–0.4)
EOSINOPHIL NFR BLD AUTO: 3.7 % (ref 0.3–6.2)
ERYTHROCYTE [DISTWIDTH] IN BLOOD BY AUTOMATED COUNT: 13.3 % (ref 12.3–15.4)
GLOBULIN UR ELPH-MCNC: 2.3 GM/DL
GLUCOSE SERPL-MCNC: 89 MG/DL (ref 65–99)
HCT VFR BLD AUTO: 48.8 % (ref 37.5–51)
HDLC SERPL-MCNC: 54 MG/DL (ref 40–60)
HGB BLD-MCNC: 16.2 G/DL (ref 13–17.7)
IMM GRANULOCYTES # BLD AUTO: 0.02 10*3/MM3 (ref 0–0.05)
IMM GRANULOCYTES NFR BLD AUTO: 0.3 % (ref 0–0.5)
LDLC SERPL CALC-MCNC: 103 MG/DL (ref 0–100)
LDLC/HDLC SERPL: 1.84 {RATIO}
LYMPHOCYTES # BLD AUTO: 2.51 10*3/MM3 (ref 0.7–3.1)
LYMPHOCYTES NFR BLD AUTO: 43.7 % (ref 19.6–45.3)
MCH RBC QN AUTO: 32.1 PG (ref 26.6–33)
MCHC RBC AUTO-ENTMCNC: 33.2 G/DL (ref 31.5–35.7)
MCV RBC AUTO: 96.6 FL (ref 79–97)
MONOCYTES # BLD AUTO: 0.61 10*3/MM3 (ref 0.1–0.9)
MONOCYTES NFR BLD AUTO: 10.6 % (ref 5–12)
NEUTROPHILS NFR BLD AUTO: 2.33 10*3/MM3 (ref 1.7–7)
NEUTROPHILS NFR BLD AUTO: 40.7 % (ref 42.7–76)
NRBC BLD AUTO-RTO: 0 /100 WBC (ref 0–0.2)
PLATELET # BLD AUTO: 284 10*3/MM3 (ref 140–450)
PMV BLD AUTO: 9.2 FL (ref 6–12)
POTASSIUM SERPL-SCNC: 4.1 MMOL/L (ref 3.5–5.2)
PROT SERPL-MCNC: 7.1 G/DL (ref 6–8.5)
RBC # BLD AUTO: 5.05 10*6/MM3 (ref 4.14–5.8)
SODIUM SERPL-SCNC: 138 MMOL/L (ref 136–145)
T4 FREE SERPL-MCNC: 1.52 NG/DL (ref 0.93–1.7)
TRIGL SERPL-MCNC: 152 MG/DL (ref 0–150)
TSH SERPL DL<=0.05 MIU/L-ACNC: 1.1 UIU/ML (ref 0.27–4.2)
VLDLC SERPL-MCNC: 27 MG/DL (ref 5–40)
WBC NRBC COR # BLD: 5.74 10*3/MM3 (ref 3.4–10.8)

## 2022-04-06 PROCEDURE — 80050 GENERAL HEALTH PANEL: CPT

## 2022-04-06 PROCEDURE — 36415 COLL VENOUS BLD VENIPUNCTURE: CPT

## 2022-04-06 PROCEDURE — 80061 LIPID PANEL: CPT

## 2022-04-06 PROCEDURE — 84439 ASSAY OF FREE THYROXINE: CPT

## 2022-04-07 ENCOUNTER — TELEPHONE (OUTPATIENT)
Dept: FAMILY MEDICINE CLINIC | Facility: CLINIC | Age: 47
End: 2022-04-07

## 2022-04-07 DIAGNOSIS — I10 ESSENTIAL HYPERTENSION: ICD-10-CM

## 2022-04-07 DIAGNOSIS — E78.2 MIXED HYPERLIPIDEMIA: Primary | ICD-10-CM

## 2022-04-07 NOTE — TELEPHONE ENCOUNTER
----- Message from BRITTANY Will sent at 4/6/2022  7:21 PM EDT -----  Trig elevated encourage low trig diet and daily cardio recheck in 6 months

## 2022-04-13 DIAGNOSIS — M79.602 BILATERAL ARM PAIN: ICD-10-CM

## 2022-04-13 DIAGNOSIS — M79.601 BILATERAL ARM PAIN: ICD-10-CM

## 2022-04-13 RX ORDER — DICLOFENAC SODIUM 75 MG/1
TABLET, DELAYED RELEASE ORAL
Qty: 60 TABLET | Refills: 0 | Status: SHIPPED | OUTPATIENT
Start: 2022-04-13 | End: 2023-01-04

## 2022-08-03 ENCOUNTER — OFFICE VISIT (OUTPATIENT)
Dept: FAMILY MEDICINE CLINIC | Facility: CLINIC | Age: 47
End: 2022-08-03

## 2022-08-03 VITALS
BODY MASS INDEX: 29.4 KG/M2 | DIASTOLIC BLOOD PRESSURE: 77 MMHG | HEIGHT: 68 IN | HEART RATE: 100 BPM | WEIGHT: 194 LBS | SYSTOLIC BLOOD PRESSURE: 116 MMHG | OXYGEN SATURATION: 97 %

## 2022-08-03 DIAGNOSIS — Z11.59 NEED FOR HEPATITIS C SCREENING TEST: ICD-10-CM

## 2022-08-03 DIAGNOSIS — I10 ESSENTIAL HYPERTENSION: ICD-10-CM

## 2022-08-03 DIAGNOSIS — E78.2 MIXED HYPERLIPIDEMIA: Primary | ICD-10-CM

## 2022-08-03 DIAGNOSIS — K21.9 GASTROESOPHAGEAL REFLUX DISEASE WITHOUT ESOPHAGITIS: ICD-10-CM

## 2022-08-03 PROCEDURE — 99214 OFFICE O/P EST MOD 30 MIN: CPT | Performed by: NURSE PRACTITIONER

## 2022-08-03 NOTE — PROGRESS NOTES
Chief Complaint  HTN, hyperlipidemia, GERD    Subjective            Agustin Stubbs presents to [facility]  Pt is here for a 6 mo f/u for HLD and HTN and GERD.   He has no concerns at this time.    Pt is due labs in October 2022, order placed.        Past Medical History:   Diagnosis Date   • Chronic allergic rhinitis    • High blood pressure    • Hyperlipemia    • Hypertension    • No pertinent past medical history        Allergies   Allergen Reactions   • Penicillins Unknown - High Severity        Past Surgical History:   Procedure Laterality Date   • COLONOSCOPY     • COLONOSCOPY N/A 12/20/2021    Procedure: COLONOSCOPY with possible biopies.;  Surgeon: Aaron Perla MD;  Location: [facility] ENDOSCOPY;  Service: General;  Laterality: N/A;  COLON POLYPS        Social History     Tobacco Use   • Smoking status: Current Every Day Smoker     Packs/day: 1.50     Types: Cigarettes   • Smokeless tobacco: Never Used   • Tobacco comment: CURRENT EVERY DAY SMOKER, SMOKED 21-30 YEARS   Substance Use Topics   • Alcohol use: Yes     Comment: 4 DRINKS PER DAY, HAS BEEN DRINKING FOR 21-30 YEARS       Family History   Problem Relation Age of Onset   • Cancer Mother    • Diabetes Mother    • Other Father         RENAL CALCULUS   • Diabetes Father    • Cancer Brother    • Breast cancer Other 30        AUNT   • Lung cancer Other         UNCLE   • Malig Hyperthermia Neg Hx         Current Outpatient Medications on File Prior to Visit   Medication Sig   • atorvastatin (LIPITOR) 10 MG tablet Take 1 tablet by mouth every night at bedtime.   • diclofenac (VOLTAREN) 75 MG EC tablet TAKE ONE TABLET BY MOUTH TWICE A DAY   • fenofibrate 160 MG tablet Take 1 tablet by mouth Daily.   • Lactobacillus (PROBIOTIC ACIDOPHILUS PO) Take  by mouth.   • lisinopril-hydrochlorothiazide (PRINZIDE,ZESTORETIC) 10-12.5 MG per tablet Take 1 tablet by mouth Daily.   • multivitamin with minerals tablet tablet Take 1 tablet  "by mouth Daily.   • omeprazole (priLOSEC) 40 MG capsule Take 1 capsule by mouth Daily.     No current facility-administered medications on file prior to visit.       Health Maintenance Due   Topic Date Due   • HEPATITIS C SCREENING  Never done   • ANNUAL PHYSICAL  08/04/2022       Objective     /77   Pulse 100   Ht 172.7 cm (68\")   Wt 88 kg (194 lb)   SpO2 97%   BMI 29.50 kg/m²       Physical Exam  Constitutional:       General: He is not in acute distress.     Appearance: Normal appearance. He is not ill-appearing.   HENT:      Head: Normocephalic and atraumatic.   Cardiovascular:      Rate and Rhythm: Normal rate and regular rhythm.      Heart sounds: Normal heart sounds. No murmur heard.  Pulmonary:      Effort: Pulmonary effort is normal. No respiratory distress.      Breath sounds: Normal breath sounds.   Chest:      Chest wall: No tenderness.   Abdominal:      General: Abdomen is flat. Bowel sounds are normal. There is no distension.      Palpations: Abdomen is soft. There is no mass.      Tenderness: There is no abdominal tenderness. There is no guarding.   Musculoskeletal:         General: No swelling or tenderness. Normal range of motion.      Cervical back: Normal range of motion and neck supple.   Skin:     General: Skin is warm and dry.      Findings: No rash.   Neurological:      General: No focal deficit present.      Mental Status: He is alert and oriented to person, place, and time. Mental status is at baseline.      Gait: Gait normal.   Psychiatric:         Mood and Affect: Mood normal.         Behavior: Behavior normal.         Thought Content: Thought content normal.         Judgment: Judgment normal.           Result Review :                           Assessment and Plan        Diagnoses and all orders for this visit:    1. Mixed hyperlipidemia (Primary)  Comments:  stable on fenofibrate 160mg and lipitor 10mg, continue  Orders:  -     Cancel: Comprehensive metabolic panel; Future  -   "   Cancel: Lipid panel; Future    2. Essential hypertension  Comments:  stable on zestoretic 10/12.5mg., continue  Orders:  -     Cancel: CBC w AUTO Differential; Future  -     Cancel: Comprehensive metabolic panel; Future  -     Cancel: Lipid panel; Future    3. Need for hepatitis C screening test  -     Cancel: Hepatitis panel, acute; Future    4. Gastroesophageal reflux disease without esophagitis  Comments:  stable on omeprezole 40mg, continue              Follow Up     Return in about 6 months (around 2/3/2023).    Patient was given instructions and counseling regarding his condition or for health maintenance advice. Please see specific information pulled into the AVS if appropriate.

## 2022-10-10 ENCOUNTER — TELEPHONE (OUTPATIENT)
Dept: FAMILY MEDICINE CLINIC | Facility: CLINIC | Age: 47
End: 2022-10-10

## 2022-10-10 NOTE — TELEPHONE ENCOUNTER
Phoned patient to remind him of lab work. He states he will get in the next 2 weeks. He said his insurance has changed. I advised the patient to give the lab his new card and they will update this information.

## 2022-10-21 DIAGNOSIS — E78.2 MIXED HYPERLIPIDEMIA: ICD-10-CM

## 2022-10-21 RX ORDER — FENOFIBRATE 160 MG/1
TABLET ORAL
Qty: 90 TABLET | Refills: 1 | Status: SHIPPED | OUTPATIENT
Start: 2022-10-21

## 2022-10-21 RX ORDER — ATORVASTATIN CALCIUM 10 MG/1
TABLET, FILM COATED ORAL
Qty: 90 TABLET | Refills: 1 | Status: SHIPPED | OUTPATIENT
Start: 2022-10-21

## 2022-10-24 ENCOUNTER — TELEPHONE (OUTPATIENT)
Dept: FAMILY MEDICINE CLINIC | Facility: CLINIC | Age: 47
End: 2022-10-24

## 2022-11-22 ENCOUNTER — TELEPHONE (OUTPATIENT)
Dept: FAMILY MEDICINE CLINIC | Facility: CLINIC | Age: 47
End: 2022-11-22

## 2022-12-09 ENCOUNTER — TELEPHONE (OUTPATIENT)
Dept: FAMILY MEDICINE CLINIC | Facility: CLINIC | Age: 47
End: 2022-12-09

## 2023-01-01 ENCOUNTER — HOSPITAL ENCOUNTER (EMERGENCY)
Facility: HOSPITAL | Age: 48
Discharge: HOME | End: 2023-01-01
Attending: EMERGENCY MEDICINE | Admitting: EMERGENCY MEDICINE
Payer: COMMERCIAL

## 2023-01-01 ENCOUNTER — APPOINTMENT (OUTPATIENT)
Dept: GENERAL RADIOLOGY | Facility: HOSPITAL | Age: 48
End: 2023-01-01
Payer: COMMERCIAL

## 2023-01-01 VITALS
WEIGHT: 180 LBS | HEART RATE: 89 BPM | DIASTOLIC BLOOD PRESSURE: 101 MMHG | TEMPERATURE: 98.2 F | RESPIRATION RATE: 16 BRPM | BODY MASS INDEX: 26.66 KG/M2 | OXYGEN SATURATION: 100 % | SYSTOLIC BLOOD PRESSURE: 154 MMHG | HEIGHT: 69 IN

## 2023-01-01 DIAGNOSIS — S22.41XA CLOSED FRACTURE OF MULTIPLE RIBS OF RIGHT SIDE, INITIAL ENCOUNTER: Primary | ICD-10-CM

## 2023-01-01 PROCEDURE — 71101 X-RAY EXAM UNILAT RIBS/CHEST: CPT

## 2023-01-01 PROCEDURE — 99283 EMERGENCY DEPT VISIT LOW MDM: CPT

## 2023-01-01 RX ORDER — OXYCODONE HYDROCHLORIDE AND ACETAMINOPHEN 5; 325 MG/1; MG/1
1 TABLET ORAL EVERY 6 HOURS PRN
Qty: 15 TABLET | Refills: 0 | Status: SHIPPED | OUTPATIENT
Start: 2023-01-01 | End: 2023-02-06

## 2023-01-01 RX ORDER — IBUPROFEN 600 MG/1
600 TABLET ORAL ONCE
Status: COMPLETED | OUTPATIENT
Start: 2023-01-01 | End: 2023-01-01

## 2023-01-01 RX ADMIN — IBUPROFEN 600 MG: 600 TABLET, FILM COATED ORAL at 06:10

## 2023-01-01 NOTE — Clinical Note
[facility] EMERGENCY ROOM  [address]  [place] [address]  Phone: [phone]    Agustin Stubbs was seen and treated in our emergency department on 1/1/2023.  He may return to work on 01/04/2023.         Thank you for choosing [facility].    Anais Gould APRN
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
## 2023-01-01 NOTE — DISCHARGE INSTRUCTIONS
Rest, drink plenty of fluids.  Take your meds as prescribed.  You may also take over-the-counter Motrin with this medication as needed for pain.  Do not drink alcohol with your narcotic pain medications.  You will need to use a small towel or small pillow as a splint when bending coughing or deep breathing.  Use your incentive spirometer at least 10 deep breaths every hour while awake.  This will help prevent any type of pneumonia.  Follow-up with BRITTANY Will in 2 days for reevaluation and further treatment as necessary.  Return to the emergency department immediately for any acutely developing respiratory distress, any persistent chest pain, any coughing up blood or any new or worse concerns.

## 2023-01-04 ENCOUNTER — OFFICE VISIT (OUTPATIENT)
Dept: FAMILY MEDICINE CLINIC | Facility: CLINIC | Age: 48
End: 2023-01-04
Payer: COMMERCIAL

## 2023-01-04 VITALS
HEIGHT: 69 IN | WEIGHT: 208 LBS | HEART RATE: 93 BPM | SYSTOLIC BLOOD PRESSURE: 140 MMHG | BODY MASS INDEX: 30.81 KG/M2 | OXYGEN SATURATION: 96 % | DIASTOLIC BLOOD PRESSURE: 99 MMHG

## 2023-01-04 DIAGNOSIS — I10 PRIMARY HYPERTENSION: ICD-10-CM

## 2023-01-04 DIAGNOSIS — S22.41XA CLOSED FRACTURE OF MULTIPLE RIBS OF RIGHT SIDE, INITIAL ENCOUNTER: Primary | ICD-10-CM

## 2023-01-04 PROCEDURE — 99213 OFFICE O/P EST LOW 20 MIN: CPT | Performed by: NURSE PRACTITIONER

## 2023-01-04 NOTE — PROGRESS NOTES
Chief Complaint  Rib Injury    Subjective    {Problem List  Visit Diagnosis   Encounters  Notes  Medications  Labs  Result Review Imaging  Media :23}    Agustin Stubbs presents to [facility]  History of Present Illness    PT here for a follow up from ER on 01/01. PT was found to have 3 broken ribs on the right side. PT was prescribed Percocet 325 MG for the pain. 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 PT unable to lift and move due to broken ribs. PT currently has ice pack/ wrap on injury.         Objective   Vital Signs:  /99   Pulse 93   Ht 175.3 cm (69\")   Wt 94.3 kg (208 lb)   SpO2 96%   BMI 30.72 kg/m²   Estimated body mass index is 30.72 kg/m² as calculated from the following:    Height as of this encounter: 175.3 cm (69\").    Weight as of this encounter: 94.3 kg (208 lb).    {BMI is >= 25 and <30. (Overweight) The following options were offered after discussion; (Optional):75234}      Physical Exam   Result Review :{Labs  Result Review  Imaging  Med Tab  Media  Procedures  :23}  {The following data was reviewed by (Optional):10287}  {Ambulatory Labs (Optional):80161}  {Data reviewed (Optional):00785:::1}          Assessment and Plan {CC Problem List  Visit Diagnosis   ROS  Review (Popup)  Health Maintenance  Quality  BestPractice  Medications  SmartSets  SnapShot Encounters  Media :23}  There are no diagnoses linked to this encounter.       {Time Spent (Optional):67803}  Follow Up {Instructions Charge Capture  Follow-up Communications :23}  No follow-ups on file.  Patient was given instructions and counseling regarding his condition or for health maintenance advice. Please see specific information pulled into the AVS if appropriate.
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
## 2023-01-09 ENCOUNTER — TELEPHONE (OUTPATIENT)
Dept: FAMILY MEDICINE CLINIC | Facility: CLINIC | Age: 48
End: 2023-01-09
Payer: COMMERCIAL

## 2023-01-09 NOTE — TELEPHONE ENCOUNTER
Patient was calling to see if short term disability paperwork was filled out. And wanted to make sure you got all the notes he had with it. Please call back to discuss. [phone]

## 2023-01-22 DIAGNOSIS — I10 ESSENTIAL HYPERTENSION: ICD-10-CM

## 2023-01-23 RX ORDER — LISINOPRIL AND HYDROCHLOROTHIAZIDE 12.5; 1 MG/1; MG/1
TABLET ORAL
Qty: 90 TABLET | Refills: 1 | Status: SHIPPED | OUTPATIENT
Start: 2023-01-23

## 2023-02-06 ENCOUNTER — HOSPITAL ENCOUNTER (OUTPATIENT)
Dept: GENERAL RADIOLOGY | Facility: HOSPITAL | Age: 48
Discharge: HOME | End: 2023-02-06
Admitting: NURSE PRACTITIONER
Payer: COMMERCIAL

## 2023-02-06 ENCOUNTER — OFFICE VISIT (OUTPATIENT)
Dept: FAMILY MEDICINE CLINIC | Facility: CLINIC | Age: 48
End: 2023-02-06
Payer: COMMERCIAL

## 2023-02-06 VITALS
HEART RATE: 102 BPM | WEIGHT: 209 LBS | OXYGEN SATURATION: 97 % | SYSTOLIC BLOOD PRESSURE: 132 MMHG | BODY MASS INDEX: 30.96 KG/M2 | DIASTOLIC BLOOD PRESSURE: 85 MMHG | HEIGHT: 69 IN

## 2023-02-06 DIAGNOSIS — K21.9 GASTROESOPHAGEAL REFLUX DISEASE WITHOUT ESOPHAGITIS: ICD-10-CM

## 2023-02-06 DIAGNOSIS — Z11.59 NEED FOR HEPATITIS C SCREENING TEST: ICD-10-CM

## 2023-02-06 DIAGNOSIS — I10 ESSENTIAL HYPERTENSION: ICD-10-CM

## 2023-02-06 DIAGNOSIS — Z13.29 SCREENING FOR THYROID DISORDER: ICD-10-CM

## 2023-02-06 DIAGNOSIS — E78.2 MIXED HYPERLIPIDEMIA: ICD-10-CM

## 2023-02-06 DIAGNOSIS — Z87.81 HISTORY OF RIB FRACTURE: ICD-10-CM

## 2023-02-06 DIAGNOSIS — Z00.00 ANNUAL PHYSICAL EXAM: Primary | ICD-10-CM

## 2023-02-06 PROCEDURE — 71101 X-RAY EXAM UNILAT RIBS/CHEST: CPT

## 2023-02-06 PROCEDURE — 99396 PREV VISIT EST AGE 40-64: CPT | Performed by: NURSE PRACTITIONER

## 2023-02-06 NOTE — PROGRESS NOTES
Chief Complaint  Hyperlipidemia, Hypertension, and Annual Exam    Subjective            Agustin Stubbs presents to [facility]  History of Present Illness  Pt is here for an annual CPE for HTN and HLD. No issues or concerns at this time.    Pt is due labs.     Pt declines vaccines at this time. Pt understands the risks of not having.     Pt states he is feeling much better from his broken ribs on the right side from 12/31/22. Pt states there is only mild discomfort at this time. Pt is inquiring about a CXR to ensure resolution.     Colonosocpy was in 2021 was told to repeat in 3 years.        Past Medical History:   Diagnosis Date   • Chronic allergic rhinitis    • High blood pressure    • Hyperlipemia    • Hypertension    • No pertinent past medical history        Allergies   Allergen Reactions   • Penicillins Unknown - High Severity        Past Surgical History:   Procedure Laterality Date   • COLONOSCOPY     • COLONOSCOPY N/A 12/20/2021    Procedure: COLONOSCOPY with possible biopies.;  Surgeon: Aaron Perla MD;  Location: [facility] ENDOSCOPY;  Service: General;  Laterality: N/A;  COLON POLYPS        Social History     Tobacco Use   • Smoking status: Every Day     Packs/day: 1.50     Types: Cigarettes     Passive exposure: Current   • Smokeless tobacco: Never   • Tobacco comments:     CURRENT EVERY DAY SMOKER, SMOKED 21-30 YEARS   Substance Use Topics   • Alcohol use: Yes     Comment: 4 DRINKS PER DAY, HAS BEEN DRINKING FOR 21-30 YEARS       Family History   Problem Relation Age of Onset   • Cancer Mother    • Diabetes Mother    • Other Father         RENAL CALCULUS   • Diabetes Father    • Cancer Brother    • Breast cancer Other 30        AUNT   • Lung cancer Other         UNCLE   • Malig Hyperthermia Neg Hx         Current Outpatient Medications on File Prior to Visit   Medication Sig   • atorvastatin (LIPITOR) 10 MG tablet TAKE ONE TABLET BY MOUTH EVERY NIGHT AT BEDTIME   •  "fenofibrate 160 MG tablet TAKE ONE TABLET BY MOUTH DAILY   • Lactobacillus (PROBIOTIC ACIDOPHILUS PO) Take  by mouth.   • lisinopril-hydrochlorothiazide (PRINZIDE,ZESTORETIC) 10-12.5 MG per tablet TAKE ONE TABLET BY MOUTH DAILY   • multivitamin with minerals tablet tablet Take 1 tablet by mouth Daily.   • omeprazole (priLOSEC) 40 MG capsule Take 1 capsule by mouth Daily.   • [DISCONTINUED] oxyCODONE-acetaminophen (PERCOCET) 5-325 MG per tablet Take 1 tablet by mouth Every 6 (Six) Hours As Needed for Moderate Pain.     No current facility-administered medications on file prior to visit.       Health Maintenance Due   Topic Date Due   • HEPATITIS C SCREENING  Never done   • ANNUAL PHYSICAL  08/03/2022       Objective     /85   Pulse 102   Ht 175.3 cm (69\")   Wt 94.8 kg (209 lb)   SpO2 97%   BMI 30.86 kg/m²       Physical Exam  Constitutional:       General: He is not in acute distress.     Appearance: Normal appearance. He is not ill-appearing.   HENT:      Head: Normocephalic and atraumatic.      Right Ear: Tympanic membrane, ear canal and external ear normal.      Left Ear: Tympanic membrane, ear canal and external ear normal.      Nose: Nose normal.   Cardiovascular:      Rate and Rhythm: Normal rate and regular rhythm.      Heart sounds: Normal heart sounds. No murmur heard.  Pulmonary:      Effort: Pulmonary effort is normal. No respiratory distress.      Breath sounds: Normal breath sounds.   Chest:      Chest wall: No tenderness.   Abdominal:      General: Abdomen is flat. Bowel sounds are normal. There is no distension.      Palpations: Abdomen is soft. There is no mass.      Tenderness: There is no abdominal tenderness. There is no guarding.   Musculoskeletal:         General: No swelling or tenderness. Normal range of motion.      Cervical back: Normal range of motion and neck supple.   Skin:     General: Skin is warm and dry.      Findings: No rash.   Neurological:      General: No focal deficit " present.      Mental Status: He is alert and oriented to person, place, and time. Mental status is at baseline.      Gait: Gait normal.   Psychiatric:         Mood and Affect: Mood normal.         Behavior: Behavior normal.         Thought Content: Thought content normal.         Judgment: Judgment normal.           Result Review :                           Assessment and Plan        Diagnoses and all orders for this visit:    1. Annual physical exam (Primary)  -     Hepatitis panel, acute; Future  -     CBC w AUTO Differential; Future  -     Comprehensive metabolic panel; Future  -     Lipid panel; Future  -     TSH; Future  -     T4, free; Future    2. Mixed hyperlipidemia  Comments:  stable on lipitor 10mg and fenofibrate 160mg, continue  Orders:  -     Comprehensive metabolic panel; Future  -     Lipid panel; Future    3. Essential hypertension  Comments:  stable on zestoretic 10/12.5mg, continue  Orders:  -     CBC w AUTO Differential; Future  -     Comprehensive metabolic panel; Future  -     Lipid panel; Future    4. Screening for thyroid disorder  -     TSH; Future  -     T4, free; Future    5. Need for hepatitis C screening test  -     Hepatitis panel, acute; Future    6. Gastroesophageal reflux disease without esophagitis  Comments:  stable on prilosec 40mg, continue    7. History of rib fracture  -     XR Ribs Right With PA Chest; Future      Preventative Counseling:  Healthy diet  Daily exercise  Get adequate sleep        Follow Up     Return in about 6 months (around 8/6/2023).    Patient was given instructions and counseling regarding his condition or for health maintenance advice. Please see specific information pulled into the AVS if appropriate.     Agustin Stubbs  reports that he has been smoking cigarettes. He has been smoking an average of 1.5 packs per day. He has been exposed to tobacco smoke. He has never used smokeless tobacco.. I have educated him on the risk of diseases from using tobacco  products such as cancer, COPD and heart disease.     I advised him to quit and he is not willing to quit.    I spent 3  minutes counseling the patient.

## 2023-02-09 ENCOUNTER — LAB (OUTPATIENT)
Dept: LAB | Facility: HOSPITAL | Age: 48
End: 2023-02-09
Payer: COMMERCIAL

## 2023-02-09 DIAGNOSIS — Z13.29 SCREENING FOR THYROID DISORDER: ICD-10-CM

## 2023-02-09 DIAGNOSIS — I10 ESSENTIAL HYPERTENSION: ICD-10-CM

## 2023-02-09 DIAGNOSIS — E78.2 MIXED HYPERLIPIDEMIA: ICD-10-CM

## 2023-02-09 DIAGNOSIS — Z00.00 ANNUAL PHYSICAL EXAM: ICD-10-CM

## 2023-02-09 DIAGNOSIS — Z11.59 NEED FOR HEPATITIS C SCREENING TEST: ICD-10-CM

## 2023-02-09 LAB
ALBUMIN SERPL-MCNC: 4.6 G/DL (ref 3.5–5.2)
ALBUMIN/GLOB SERPL: 1.9 G/DL
ALP SERPL-CCNC: 68 U/L (ref 39–117)
ALT SERPL W P-5'-P-CCNC: 23 U/L (ref 1–41)
ANION GAP SERPL CALCULATED.3IONS-SCNC: 9 MMOL/L (ref 5–15)
AST SERPL-CCNC: 21 U/L (ref 1–40)
BASOPHILS # BLD AUTO: 0.09 10*3/MM3 (ref 0–0.2)
BASOPHILS NFR BLD AUTO: 1 % (ref 0–1.5)
BILIRUB SERPL-MCNC: 0.6 MG/DL (ref 0–1.2)
BUN SERPL-MCNC: 8 MG/DL (ref 6–20)
BUN/CREAT SERPL: 9.8 (ref 7–25)
CALCIUM SPEC-SCNC: 9.2 MG/DL (ref 8.6–10.5)
CHLORIDE SERPL-SCNC: 103 MMOL/L (ref 98–107)
CHOLEST SERPL-MCNC: 181 MG/DL (ref 0–200)
CO2 SERPL-SCNC: 26 MMOL/L (ref 22–29)
CREAT SERPL-MCNC: 0.82 MG/DL (ref 0.76–1.27)
DEPRECATED RDW RBC AUTO: 46.6 FL (ref 37–54)
EGFRCR SERPLBLD CKD-EPI 2021: 108.4 ML/MIN/1.73
EOSINOPHIL # BLD AUTO: 0.13 10*3/MM3 (ref 0–0.4)
EOSINOPHIL NFR BLD AUTO: 1.5 % (ref 0.3–6.2)
ERYTHROCYTE [DISTWIDTH] IN BLOOD BY AUTOMATED COUNT: 13.5 % (ref 12.3–15.4)
GLOBULIN UR ELPH-MCNC: 2.4 GM/DL
GLUCOSE SERPL-MCNC: 100 MG/DL (ref 65–99)
HAV IGM SERPL QL IA: NORMAL
HBV CORE IGM SERPL QL IA: NORMAL
HBV SURFACE AG SERPL QL IA: NORMAL
HCT VFR BLD AUTO: 46.4 % (ref 37.5–51)
HCV AB SER DONR QL: NORMAL
HDLC SERPL-MCNC: 61 MG/DL (ref 40–60)
HGB BLD-MCNC: 16 G/DL (ref 13–17.7)
IMM GRANULOCYTES # BLD AUTO: 0.07 10*3/MM3 (ref 0–0.05)
IMM GRANULOCYTES NFR BLD AUTO: 0.8 % (ref 0–0.5)
LDLC SERPL CALC-MCNC: 105 MG/DL (ref 0–100)
LDLC/HDLC SERPL: 1.69 {RATIO}
LYMPHOCYTES # BLD AUTO: 2.29 10*3/MM3 (ref 0.7–3.1)
LYMPHOCYTES NFR BLD AUTO: 26.5 % (ref 19.6–45.3)
MCH RBC QN AUTO: 32.6 PG (ref 26.6–33)
MCHC RBC AUTO-ENTMCNC: 34.5 G/DL (ref 31.5–35.7)
MCV RBC AUTO: 94.5 FL (ref 79–97)
MONOCYTES # BLD AUTO: 0.63 10*3/MM3 (ref 0.1–0.9)
MONOCYTES NFR BLD AUTO: 7.3 % (ref 5–12)
NEUTROPHILS NFR BLD AUTO: 5.43 10*3/MM3 (ref 1.7–7)
NEUTROPHILS NFR BLD AUTO: 62.9 % (ref 42.7–76)
NRBC BLD AUTO-RTO: 0 /100 WBC (ref 0–0.2)
PLATELET # BLD AUTO: 339 10*3/MM3 (ref 140–450)
PMV BLD AUTO: 9 FL (ref 6–12)
POTASSIUM SERPL-SCNC: 3.7 MMOL/L (ref 3.5–5.2)
PROT SERPL-MCNC: 7 G/DL (ref 6–8.5)
RBC # BLD AUTO: 4.91 10*6/MM3 (ref 4.14–5.8)
SODIUM SERPL-SCNC: 138 MMOL/L (ref 136–145)
T4 FREE SERPL-MCNC: 1.51 NG/DL (ref 0.93–1.7)
TRIGL SERPL-MCNC: 84 MG/DL (ref 0–150)
TSH SERPL DL<=0.05 MIU/L-ACNC: 0.67 UIU/ML (ref 0.27–4.2)
VLDLC SERPL-MCNC: 15 MG/DL (ref 5–40)
WBC NRBC COR # BLD: 8.64 10*3/MM3 (ref 3.4–10.8)

## 2023-02-09 PROCEDURE — 84443 ASSAY THYROID STIM HORMONE: CPT

## 2023-02-09 PROCEDURE — 36415 COLL VENOUS BLD VENIPUNCTURE: CPT

## 2023-02-09 PROCEDURE — 80074 ACUTE HEPATITIS PANEL: CPT

## 2023-02-09 PROCEDURE — 80061 LIPID PANEL: CPT

## 2023-02-09 PROCEDURE — 80053 COMPREHEN METABOLIC PANEL: CPT

## 2023-02-09 PROCEDURE — 85025 COMPLETE CBC W/AUTO DIFF WBC: CPT

## 2023-02-09 PROCEDURE — 84439 ASSAY OF FREE THYROXINE: CPT

## 2023-04-22 DIAGNOSIS — E78.2 MIXED HYPERLIPIDEMIA: ICD-10-CM

## 2023-04-24 RX ORDER — FENOFIBRATE 160 MG/1
TABLET ORAL
Qty: 90 TABLET | Refills: 1 | Status: SHIPPED | OUTPATIENT
Start: 2023-04-24

## 2023-04-24 RX ORDER — ATORVASTATIN CALCIUM 10 MG/1
TABLET, FILM COATED ORAL
Qty: 90 TABLET | Refills: 1 | Status: SHIPPED | OUTPATIENT
Start: 2023-04-24

## 2023-04-26 ENCOUNTER — TELEPHONE (OUTPATIENT)
Dept: FAMILY MEDICINE CLINIC | Facility: CLINIC | Age: 48
End: 2023-04-26
Payer: COMMERCIAL

## 2023-04-26 DIAGNOSIS — F17.210 CIGARETTE NICOTINE DEPENDENCE WITHOUT COMPLICATION: Primary | ICD-10-CM

## 2023-04-26 RX ORDER — NICOTINE 21 MG/24HR
1 PATCH, TRANSDERMAL 24 HOURS TRANSDERMAL EVERY 24 HOURS
Qty: 30 EACH | Refills: 0 | Status: SHIPPED | OUTPATIENT
Start: 2023-04-26

## 2023-04-26 NOTE — TELEPHONE ENCOUNTER
Caller: Kina Stubbs    Relationship: Self    Best call back number: [phone]    What medication are you requesting: SOMETHING TO HELP QUIT SMOKING (PATCHES)     Have you had these symptoms before:    [] Yes  [x] No    Have you been treated for these symptoms before:   [] Yes  [x] No    If a prescription is needed, what is your preferred pharmacy and phone number:       [id number] - [place], [place] - [address] AT [facility] [phone] [facility] [phone]   [phone]        Additional notes:    PATIENT STATED IF PROVIDER WILL WRITE A PRESCRIPTION FOR PATCHES TO STOP SMOKING INSURANCE WILL PAY FOR PATCHES . INSURANCE NEEDS 3 STEPS TO COMPLETE FOR PATIENT.

## 2023-04-26 NOTE — TELEPHONE ENCOUNTER
Caller: MAGI HUGGINS    Relationship: Emergency Contact    Best call back number: [phone]    What is the medical concern/diagnosis: EAR PAIN ( PATIENT WIFE NOT SURE WHICH EAR)     What specialty or service is being requested: ENT     What is the provider, practice or medical service name: N/A     What is the office location:[place]     What is the office phone number: N/A

## 2023-05-11 ENCOUNTER — OFFICE VISIT (OUTPATIENT)
Dept: FAMILY MEDICINE CLINIC | Facility: CLINIC | Age: 48
End: 2023-05-11
Payer: COMMERCIAL

## 2023-05-11 VITALS
OXYGEN SATURATION: 99 % | DIASTOLIC BLOOD PRESSURE: 87 MMHG | BODY MASS INDEX: 30.07 KG/M2 | WEIGHT: 203 LBS | HEART RATE: 93 BPM | HEIGHT: 69 IN | SYSTOLIC BLOOD PRESSURE: 147 MMHG

## 2023-05-11 DIAGNOSIS — H93.8X3 SENSATION OF FULLNESS IN BOTH EARS: Primary | ICD-10-CM

## 2023-05-11 PROCEDURE — 99213 OFFICE O/P EST LOW 20 MIN: CPT | Performed by: NURSE PRACTITIONER

## 2023-05-11 RX ORDER — FLUTICASONE PROPIONATE 50 MCG
2 SPRAY, SUSPENSION (ML) NASAL DAILY
Qty: 11.1 ML | Refills: 1 | Status: SHIPPED | OUTPATIENT
Start: 2023-05-11

## 2023-05-11 NOTE — PROGRESS NOTES
Chief Complaint  Tinnitus and Ear Fullness    Subjective            Agustin Stubbs presents to [facility]  History of Present Illness  Pt is here to discuss ear fullness. PT states he has tried to clean his ears, but this always returns. Pt denies any pain. Pt states he does have to wear headphones at work and thinks this may be contributing to this. He feels a fullness in both ears.  He denies any tinnnitis.     Pt is due covid vaccine. Pt declines and understands the risks of not having.         Past Medical History:   Diagnosis Date   • Chronic allergic rhinitis    • High blood pressure    • Hyperlipemia    • Hypertension    • No pertinent past medical history        Allergies   Allergen Reactions   • Penicillins Unknown - High Severity        Past Surgical History:   Procedure Laterality Date   • COLONOSCOPY     • COLONOSCOPY N/A 12/20/2021    Procedure: COLONOSCOPY with possible biopies.;  Surgeon: Aaron Perla MD;  Location: [facility] ENDOSCOPY;  Service: General;  Laterality: N/A;  COLON POLYPS        Social History     Tobacco Use   • Smoking status: Every Day     Packs/day: 1.50     Types: Cigarettes     Passive exposure: Current   • Smokeless tobacco: Never   • Tobacco comments:     CURRENT EVERY DAY SMOKER, SMOKED 21-30 YEARS   Substance Use Topics   • Alcohol use: Yes     Comment: 4 DRINKS PER DAY, HAS BEEN DRINKING FOR 21-30 YEARS       Family History   Problem Relation Age of Onset   • Cancer Mother    • Diabetes Mother    • Other Father         RENAL CALCULUS   • Diabetes Father    • Cancer Brother    • Breast cancer Other 30        AUNT   • Lung cancer Other         UNCLE   • Malig Hyperthermia Neg Hx         Current Outpatient Medications on File Prior to Visit   Medication Sig   • fenofibrate 160 MG tablet TAKE ONE TABLET BY MOUTH DAILY   • Lactobacillus (PROBIOTIC ACIDOPHILUS PO) Take  by mouth.   • lisinopril-hydrochlorothiazide (PRINZIDE,ZESTORETIC) 10-12.5  "MG per tablet TAKE ONE TABLET BY MOUTH DAILY   • multivitamin with minerals tablet tablet Take 1 tablet by mouth Daily.   • nicotine (Nicoderm CQ) 21 MG/24HR patch Place 1 patch on the skin as directed by provider Daily.   • omeprazole (priLOSEC) 40 MG capsule Take 1 capsule by mouth Daily.   • [DISCONTINUED] atorvastatin (LIPITOR) 10 MG tablet TAKE ONE TABLET BY MOUTH EVERY NIGHT AT BEDTIME (Patient not taking: Reported on 5/11/2023)     No current facility-administered medications on file prior to visit.       There are no preventive care reminders to display for this patient.    Objective     /87   Pulse 93   Ht 175.3 cm (69\")   Wt 92.1 kg (203 lb)   SpO2 99%   BMI 29.98 kg/m²       Physical Exam  Constitutional:       General: He is not in acute distress.     Appearance: Normal appearance. He is not ill-appearing.   HENT:      Head: Normocephalic and atraumatic.      Right Ear: Tympanic membrane, ear canal and external ear normal.      Left Ear: Tympanic membrane, ear canal and external ear normal.      Nose: Nose normal.   Cardiovascular:      Rate and Rhythm: Normal rate and regular rhythm.      Heart sounds: Normal heart sounds. No murmur heard.  Pulmonary:      Effort: Pulmonary effort is normal. No respiratory distress.      Breath sounds: Normal breath sounds.   Chest:      Chest wall: No tenderness.   Abdominal:      General: There is no distension.      Palpations: There is no mass.      Tenderness: There is no abdominal tenderness. There is no guarding.   Musculoskeletal:         General: No swelling or tenderness. Normal range of motion.      Cervical back: Normal range of motion and neck supple.   Skin:     General: Skin is warm and dry.      Findings: No rash.   Neurological:      General: No focal deficit present.      Mental Status: He is alert and oriented to person, place, and time. Mental status is at baseline.      Gait: Gait normal.   Psychiatric:         Mood and Affect: Mood " normal.         Behavior: Behavior normal.         Thought Content: Thought content normal.         Judgment: Judgment normal.           Result Review :                           Assessment and Plan        Diagnoses and all orders for this visit:    1. Sensation of fullness in both ears (Primary)  -     fluticasone (FLONASE) 50 MCG/ACT nasal spray; 2 sprays into the nostril(s) as directed by provider Daily.  Dispense: 11.1 mL; Refill: 1              Follow Up     Return if symptoms worsen or fail to improve.    Patient was given instructions and counseling regarding his condition or for health maintenance advice. Please see specific information pulled into the AVS if appropriate.     Agustin JACK Jermainsilvanokayy  reports that he has been smoking cigarettes. He has been smoking an average of 1.5 packs per day. He has been exposed to tobacco smoke. He has never used smokeless tobacco.. I have educated him on the risk of diseases from using tobacco products such as cancer, COPD and heart disease.     I advised him to quit and he is not willing to quit.    I spent 3  minutes counseling the patient.

## 2023-07-23 DIAGNOSIS — I10 ESSENTIAL HYPERTENSION: ICD-10-CM

## 2023-07-24 RX ORDER — LISINOPRIL AND HYDROCHLOROTHIAZIDE 12.5; 1 MG/1; MG/1
TABLET ORAL
Qty: 90 TABLET | Refills: 1 | Status: SHIPPED | OUTPATIENT
Start: 2023-07-24

## 2023-08-07 ENCOUNTER — OFFICE VISIT (OUTPATIENT)
Dept: FAMILY MEDICINE CLINIC | Facility: CLINIC | Age: 48
End: 2023-08-07
Payer: COMMERCIAL

## 2023-08-07 VITALS
WEIGHT: 208 LBS | OXYGEN SATURATION: 97 % | SYSTOLIC BLOOD PRESSURE: 140 MMHG | BODY MASS INDEX: 30.81 KG/M2 | DIASTOLIC BLOOD PRESSURE: 94 MMHG | HEIGHT: 69 IN | HEART RATE: 75 BPM

## 2023-08-07 DIAGNOSIS — I10 PRIMARY HYPERTENSION: Primary | ICD-10-CM

## 2023-08-07 DIAGNOSIS — K21.9 GASTROESOPHAGEAL REFLUX DISEASE WITHOUT ESOPHAGITIS: ICD-10-CM

## 2023-08-07 DIAGNOSIS — E78.2 MIXED HYPERLIPIDEMIA: ICD-10-CM

## 2023-08-07 PROCEDURE — 99214 OFFICE O/P EST MOD 30 MIN: CPT | Performed by: NURSE PRACTITIONER

## 2023-08-07 NOTE — PROGRESS NOTES
Chief Complaint  Hyperlipidemia, Hypertension, and Heartburn    Subjective            Agustin Stubbs presents to [facility]  History of Present Illness  Pt is a f/u for HLD, HTN, and GERD. No issues or concerns at this time.     Pt is due covid, pneumonia, and Tdap vaccines. Pt declines and understands the risks of not having.     Pt is due colon 11/2024. Every 3 yrs per Dr. Perla. Last one was in 2021.      Past Medical History:   Diagnosis Date    Chronic allergic rhinitis     High blood pressure     Hyperlipemia     Hypertension     No pertinent past medical history        Allergies   Allergen Reactions    Penicillins Unknown - High Severity        Past Surgical History:   Procedure Laterality Date    COLONOSCOPY      COLONOSCOPY N/A 12/20/2021    Procedure: COLONOSCOPY with possible biopies.;  Surgeon: Aaron Perla MD;  Location: [facility] ENDOSCOPY;  Service: General;  Laterality: N/A;  COLON POLYPS        Social History     Tobacco Use    Smoking status: Every Day     Packs/day: 0.25     Types: Cigarettes     Passive exposure: Current    Smokeless tobacco: Never    Tobacco comments:     CURRENT EVERY DAY SMOKER, SMOKED 21-30 YEARS   Substance Use Topics    Alcohol use: Yes     Comment: 4 DRINKS PER DAY, HAS BEEN DRINKING FOR 21-30 YEARS       Family History   Problem Relation Age of Onset    Cancer Mother     Diabetes Mother     Other Father         RENAL CALCULUS    Diabetes Father     Cancer Brother     Breast cancer Other 30        AUNT    Lung cancer Other         UNCLE    Malig Hyperthermia Neg Hx         Current Outpatient Medications on File Prior to Visit   Medication Sig    fenofibrate 160 MG tablet TAKE ONE TABLET BY MOUTH DAILY    fluticasone (FLONASE) 50 MCG/ACT nasal spray 2 sprays into the nostril(s) as directed by provider Daily.    Lactobacillus (PROBIOTIC ACIDOPHILUS PO) Take  by mouth.    lisinopril-hydrochlorothiazide (PRINZIDE,ZESTORETIC) 10-12.5 MG  "per tablet TAKE ONE TABLET BY MOUTH DAILY    multivitamin with minerals tablet tablet Take 1 tablet by mouth Daily.    nicotine (Nicoderm CQ) 21 MG/24HR patch Place 1 patch on the skin as directed by provider Daily.    omeprazole (priLOSEC) 40 MG capsule Take 1 capsule by mouth Daily.     No current facility-administered medications on file prior to visit.       There are no preventive care reminders to display for this patient.      Objective     /94   Pulse 75   Ht 175.3 cm (69\")   Wt 94.3 kg (208 lb)   SpO2 97%   BMI 30.72 kg/mý       Physical Exam  Constitutional:       General: He is not in acute distress.     Appearance: Normal appearance. He is not ill-appearing.   HENT:      Head: Normocephalic and atraumatic.   Cardiovascular:      Rate and Rhythm: Normal rate and regular rhythm.      Heart sounds: Normal heart sounds. No murmur heard.  Pulmonary:      Effort: Pulmonary effort is normal. No respiratory distress.      Breath sounds: Normal breath sounds.   Chest:      Chest wall: No tenderness.   Abdominal:      General: Abdomen is flat. Bowel sounds are normal. There is no distension.      Palpations: Abdomen is soft. There is no mass.      Tenderness: There is no abdominal tenderness. There is no guarding.   Musculoskeletal:         General: No swelling or tenderness. Normal range of motion.      Cervical back: Normal range of motion and neck supple.   Skin:     General: Skin is warm and dry.      Findings: No rash.   Neurological:      General: No focal deficit present.      Mental Status: He is alert and oriented to person, place, and time. Mental status is at baseline.      Gait: Gait normal.   Psychiatric:         Mood and Affect: Mood normal.         Behavior: Behavior normal.         Thought Content: Thought content normal.         Judgment: Judgment normal.         Result Review :                           Assessment and Plan        Diagnoses and all orders for this visit:    1. Primary " hypertension (Primary)  Comments:  stableon zestoretic 10/12.5mg, continue    2. Mixed hyperlipidemia  Comments:  stable on fenofibrate 160mg, continue    3. Gastroesophageal reflux disease without esophagitis  Comments:  stable on prilosec 40mg, continue              Follow Up     Return in about 6 months (around 2/7/2024).    Patient was given instructions and counseling regarding his condition or for health maintenance advice. Please see specific information pulled into the AVS if appropriate.     Agustin MARCIE Stubbs  reports that he has been smoking cigarettes. He has been smoking an average of .25 packs per day. He has been exposed to tobacco smoke. He has never used smokeless tobacco.. I have educated him on the risk of diseases from using tobacco products such as cancer, COPD, and heart disease.     I advised him to quit and he is not willing to quit.    I spent 3  minutes counseling the patient.

## 2023-08-28 DIAGNOSIS — F17.210 CIGARETTE NICOTINE DEPENDENCE WITHOUT COMPLICATION: ICD-10-CM

## 2023-08-28 NOTE — TELEPHONE ENCOUNTER
Caller: Enoc Agustin MARCIE    Relationship: Self    Best call back number: [phone]     Requested Prescriptions:   Requested Prescriptions     Pending Prescriptions Disp Refills    nicotine (Nicoderm CQ) 21 MG/24HR patch 30 each 0     Sig: Place 1 patch on the skin as directed by provider Daily.        Pharmacy where request should be sent: [id number] - [place], [place] - [address] AT [facility] [phone] [facility] [phone]      Last office visit with prescribing clinician: 8/7/2023   Last telemedicine visit with prescribing clinician: Visit date not found   Next office visit with prescribing clinician: 2/8/2024     Additional details provided by patient: PATIENT IS NEEDING A REFILL     Does the patient have less than a 3 day supply:  [x] Yes  [] No    Would you like a call back once the refill request has been completed: [x] Yes [] No    If the office needs to give you a call back, can they leave a voicemail: [x] Yes [] No    Elba Champion Rep   08/28/23 15:13 EDT

## 2023-08-29 RX ORDER — NICOTINE 21 MG/24HR
1 PATCH, TRANSDERMAL 24 HOURS TRANSDERMAL EVERY 24 HOURS
Qty: 30 EACH | Refills: 0 | Status: SHIPPED | OUTPATIENT
Start: 2023-08-29

## 2023-09-27 ENCOUNTER — APPOINTMENT (OUTPATIENT)
Dept: GENERAL RADIOLOGY | Facility: HOSPITAL | Age: 48
End: 2023-09-27
Payer: COMMERCIAL

## 2023-09-27 ENCOUNTER — HOSPITAL ENCOUNTER (INPATIENT)
Facility: HOSPITAL | Age: 48
LOS: 9 days | Discharge: HOME | DRG: 100 | End: 2023-10-06
Attending: INTERNAL MEDICINE | Admitting: INTERNAL MEDICINE
Payer: COMMERCIAL

## 2023-09-27 ENCOUNTER — APPOINTMENT (OUTPATIENT)
Dept: GENERAL RADIOLOGY | Facility: HOSPITAL | Age: 48
DRG: 100 | End: 2023-09-27
Payer: COMMERCIAL

## 2023-09-27 ENCOUNTER — APPOINTMENT (OUTPATIENT)
Dept: NEUROLOGY | Facility: HOSPITAL | Age: 48
DRG: 100 | End: 2023-09-27
Payer: COMMERCIAL

## 2023-09-27 ENCOUNTER — HOSPITAL ENCOUNTER (EMERGENCY)
Facility: HOSPITAL | Age: 48
Discharge: OTHER INSTITUTION | End: 2023-09-27
Attending: EMERGENCY MEDICINE
Payer: COMMERCIAL

## 2023-09-27 ENCOUNTER — APPOINTMENT (OUTPATIENT)
Dept: CT IMAGING | Facility: HOSPITAL | Age: 48
End: 2023-09-27
Payer: COMMERCIAL

## 2023-09-27 VITALS
HEART RATE: 99 BPM | HEIGHT: 71 IN | RESPIRATION RATE: 18 BRPM | DIASTOLIC BLOOD PRESSURE: 69 MMHG | BODY MASS INDEX: 28.36 KG/M2 | TEMPERATURE: 97.7 F | OXYGEN SATURATION: 97 % | SYSTOLIC BLOOD PRESSURE: 105 MMHG | WEIGHT: 202.6 LBS

## 2023-09-27 DIAGNOSIS — G40.901 STATUS EPILEPTICUS: Primary | ICD-10-CM

## 2023-09-27 DIAGNOSIS — R09.02 HYPOXIA: ICD-10-CM

## 2023-09-27 DIAGNOSIS — G47.33 OSA (OBSTRUCTIVE SLEEP APNEA): Primary | ICD-10-CM

## 2023-09-27 LAB
ACETONE BLD QL: NEGATIVE
ALBUMIN SERPL-MCNC: 4 G/DL (ref 3.5–5.2)
ALBUMIN SERPL-MCNC: 5.1 G/DL (ref 3.5–5.2)
ALBUMIN/GLOB SERPL: 2.1 G/DL
ALBUMIN/GLOB SERPL: 2.2 G/DL
ALP SERPL-CCNC: 117 U/L (ref 39–117)
ALP SERPL-CCNC: 55 U/L (ref 39–117)
ALT SERPL W P-5'-P-CCNC: 23 U/L (ref 1–41)
ALT SERPL W P-5'-P-CCNC: 30 U/L (ref 1–41)
AMMONIA BLD-SCNC: 94 UMOL/L (ref 16–60)
AMPHET+METHAMPHET UR QL: NEGATIVE
ANION GAP SERPL CALCULATED.3IONS-SCNC: 12.2 MMOL/L (ref 5–15)
ANION GAP SERPL CALCULATED.3IONS-SCNC: 35.7 MMOL/L (ref 5–15)
APAP SERPL-MCNC: <5 MCG/ML (ref 0–30)
APTT PPP: 25 SECONDS (ref 24.2–34.2)
ARTERIAL PATENCY WRIST A: POSITIVE
AST SERPL-CCNC: 28 U/L (ref 1–40)
AST SERPL-CCNC: 29 U/L (ref 1–40)
ATMOSPHERIC PRESS: 749.5 MMHG
BACTERIA UR QL AUTO: ABNORMAL /HPF
BARBITURATES UR QL SCN: NEGATIVE
BASE EXCESS BLDA CALC-SCNC: -1.1 MMOL/L (ref 0–2)
BASE EXCESS BLDA CALC-SCNC: -18.3 MMOL/L (ref -2–2)
BASE EXCESS BLDA CALC-SCNC: -27.6 MMOL/L (ref -2–2)
BASOPHILS # BLD AUTO: 0.18 10*3/MM3 (ref 0–0.2)
BASOPHILS NFR BLD AUTO: 1.1 % (ref 0–1.5)
BDY SITE: ABNORMAL
BENZODIAZ UR QL SCN: POSITIVE
BILIRUB SERPL-MCNC: 0.3 MG/DL (ref 0–1.2)
BILIRUB SERPL-MCNC: 0.8 MG/DL (ref 0–1.2)
BILIRUB UR QL STRIP: NEGATIVE
BUN SERPL-MCNC: 12 MG/DL (ref 6–20)
BUN SERPL-MCNC: 13 MG/DL (ref 6–20)
BUN/CREAT SERPL: 14.8 (ref 7–25)
BUN/CREAT SERPL: 9.6 (ref 7–25)
CA-I BLD-MCNC: 4.5 MG/DL (ref 4.6–5.4)
CA-I BLDA-SCNC: 1.19 MMOL/L (ref 1.13–1.32)
CA-I BLDA-SCNC: 1.3 MMOL/L (ref 1.13–1.32)
CA-I SERPL ISE-MCNC: 1.13 MMOL/L (ref 1.15–1.35)
CALCIUM SPEC-SCNC: 8.2 MG/DL (ref 8.6–10.5)
CALCIUM SPEC-SCNC: 9.8 MG/DL (ref 8.6–10.5)
CANNABINOIDS SERPL QL: NEGATIVE
CHLORIDE BLDA-SCNC: 104 MMOL/L (ref 98–106)
CHLORIDE BLDA-SCNC: 107 MMOL/L (ref 98–106)
CHLORIDE SERPL-SCNC: 111 MMOL/L (ref 98–107)
CHLORIDE SERPL-SCNC: 99 MMOL/L (ref 98–107)
CK SERPL-CCNC: 188 U/L (ref 20–200)
CLARITY UR: ABNORMAL
CO2 BLDA-SCNC: 25.2 MMOL/L (ref 23–27)
CO2 SERPL-SCNC: 21.8 MMOL/L (ref 22–29)
CO2 SERPL-SCNC: 5.3 MMOL/L (ref 22–29)
COCAINE UR QL: NEGATIVE
COHGB MFR BLD: 1.9 % (ref 0–1.5)
COHGB MFR BLD: 2.4 % (ref 0–1.5)
COLOR UR: YELLOW
CREAT SERPL-MCNC: 0.88 MG/DL (ref 0.76–1.27)
CREAT SERPL-MCNC: 1.25 MG/DL (ref 0.76–1.27)
D-LACTATE SERPL-SCNC: 1.4 MMOL/L (ref 0.5–2)
D-LACTATE SERPL-SCNC: 25.1 MMOL/L (ref 0.5–2)
DEPRECATED RDW RBC AUTO: 49.6 FL (ref 37–54)
DEPRECATED RDW RBC AUTO: 50.4 FL (ref 37–54)
DEVICE COMMENT: ABNORMAL
EGFRCR SERPLBLD CKD-EPI 2021: 106.1 ML/MIN/1.73
EGFRCR SERPLBLD CKD-EPI 2021: 71 ML/MIN/1.73
EOSINOPHIL # BLD AUTO: 0.32 10*3/MM3 (ref 0–0.4)
EOSINOPHIL NFR BLD AUTO: 2 % (ref 0.3–6.2)
ERYTHROCYTE [DISTWIDTH] IN BLOOD BY AUTOMATED COUNT: 13.5 % (ref 12.3–15.4)
ERYTHROCYTE [DISTWIDTH] IN BLOOD BY AUTOMATED COUNT: 13.6 % (ref 12.3–15.4)
ETHANOL BLD-MCNC: 16 MG/DL (ref 0–10)
ETHANOL UR QL: 0.02 %
FENTANYL UR-MCNC: NEGATIVE NG/ML
FHHB: 3.9 % (ref 0–5)
FHHB: 7.8 % (ref 0–5)
GAS FLOW AIRWAY: 2 LPM
GEN 5 2HR TROPONIN T REFLEX: 46 NG/L
GLOBULIN UR ELPH-MCNC: 1.9 GM/DL
GLOBULIN UR ELPH-MCNC: 2.3 GM/DL
GLUCOSE BLDA-MCNC: 245 MG/DL (ref 70–99)
GLUCOSE BLDA-MCNC: 272 MG/DL (ref 70–99)
GLUCOSE BLDC GLUCOMTR-MCNC: 108 MG/DL (ref 70–130)
GLUCOSE BLDC GLUCOMTR-MCNC: 120 MG/DL (ref 70–130)
GLUCOSE BLDC GLUCOMTR-MCNC: 269 MG/DL (ref 70–99)
GLUCOSE BLDC GLUCOMTR-MCNC: 95 MG/DL (ref 70–130)
GLUCOSE SERPL-MCNC: 252 MG/DL (ref 65–99)
GLUCOSE SERPL-MCNC: 98 MG/DL (ref 65–99)
GLUCOSE UR STRIP-MCNC: ABNORMAL MG/DL
GRAN CASTS URNS QL MICRO: ABNORMAL /LPF
HCO3 BLDA-SCNC: 14.2 MMOL/L (ref 22–26)
HCO3 BLDA-SCNC: 24 MMOL/L (ref 22–28)
HCO3 BLDA-SCNC: 5.8 MMOL/L (ref 22–26)
HCT VFR BLD AUTO: 43.6 % (ref 37.5–51)
HCT VFR BLD AUTO: 50.9 % (ref 37.5–51)
HEMODILUTION: NO
HGB BLD-MCNC: 14.8 G/DL (ref 13–17.7)
HGB BLD-MCNC: 16.9 G/DL (ref 13–17.7)
HGB BLDA-MCNC: 17 G/DL (ref 13.8–16.4)
HGB BLDA-MCNC: 17.8 G/DL (ref 13.8–16.4)
HGB UR QL STRIP.AUTO: ABNORMAL
HOLD SPECIMEN: NORMAL
HOLD SPECIMEN: NORMAL
HYALINE CASTS UR QL AUTO: ABNORMAL /LPF
IMM GRANULOCYTES # BLD AUTO: 0.48 10*3/MM3 (ref 0–0.05)
IMM GRANULOCYTES NFR BLD AUTO: 3 % (ref 0–0.5)
INHALED O2 CONCENTRATION: 28 %
INHALED O2 CONCENTRATION: 40 %
INHALED O2 CONCENTRATION: 50 %
INR PPP: 1.17 (ref 0.86–1.15)
KETONES UR QL STRIP: NEGATIVE
LACTATE BLDA-SCNC: 20.26 MMOL/L (ref 0.5–2)
LACTATE BLDA-SCNC: 9.22 MMOL/L (ref 0.5–2)
LEUKOCYTE ESTERASE UR QL STRIP.AUTO: NEGATIVE
LIPASE SERPL-CCNC: 44 U/L (ref 13–60)
LYMPHOCYTES # BLD AUTO: 7.16 10*3/MM3 (ref 0.7–3.1)
LYMPHOCYTES NFR BLD AUTO: 44.2 % (ref 19.6–45.3)
MAGNESIUM SERPL-MCNC: 2.4 MG/DL (ref 1.6–2.6)
MCH RBC QN AUTO: 33.3 PG (ref 26.6–33)
MCH RBC QN AUTO: 33.5 PG (ref 26.6–33)
MCHC RBC AUTO-ENTMCNC: 33.2 G/DL (ref 31.5–35.7)
MCHC RBC AUTO-ENTMCNC: 33.9 G/DL (ref 31.5–35.7)
MCV RBC AUTO: 100.8 FL (ref 79–97)
MCV RBC AUTO: 98 FL (ref 79–97)
METHADONE UR QL SCN: NEGATIVE
METHGB BLD QL: 0.6 % (ref 0–1.5)
METHGB BLD QL: 0.6 % (ref 0–1.5)
MODALITY: ABNORMAL
MONOCYTES # BLD AUTO: 1.51 10*3/MM3 (ref 0.1–0.9)
MONOCYTES NFR BLD AUTO: 9.3 % (ref 5–12)
NEUTROPHILS NFR BLD AUTO: 40.4 % (ref 42.7–76)
NEUTROPHILS NFR BLD AUTO: 6.56 10*3/MM3 (ref 1.7–7)
NITRITE UR QL STRIP: NEGATIVE
NOTE: ABNORMAL
NOTE: ABNORMAL
NRBC BLD AUTO-RTO: 0.1 /100 WBC (ref 0–0.2)
NT-PROBNP SERPL-MCNC: <36 PG/ML (ref 0–450)
O2 A-A PPRESDIFF RESPIRATORY: 0.5 MMHG
OPIATES UR QL: NEGATIVE
OXYCODONE UR QL SCN: NEGATIVE
OXYHGB MFR BLDV: 89.2 % (ref 94–99)
OXYHGB MFR BLDV: 93.6 % (ref 94–99)
PCO2 BLDA: 32.9 MM HG (ref 35–45)
PCO2 BLDA: 40.6 MM HG (ref 35–45)
PCO2 BLDA: 61.6 MM HG (ref 35–45)
PEEP RESPIRATORY: 5 CM[H2O]
PH BLDA: 6.86 PH UNITS (ref 7.35–7.45)
PH BLDA: 6.98 PH UNITS (ref 7.35–7.45)
PH BLDA: 7.38 PH UNITS (ref 7.35–7.45)
PH UR STRIP.AUTO: <=5 [PH] (ref 5–8)
PLATELET # BLD AUTO: 226 10*3/MM3 (ref 140–450)
PLATELET # BLD AUTO: 362 10*3/MM3 (ref 140–450)
PMV BLD AUTO: 8.7 FL (ref 6–12)
PMV BLD AUTO: 9.1 FL (ref 6–12)
PO2 BLD: 236 MM[HG] (ref 0–500)
PO2 BLD: 346 MM[HG] (ref 0–500)
PO2 BLDA: 118.2 MM HG (ref 80–100)
PO2 BLDA: 134.3 MM HG (ref 80–100)
PO2 BLDA: 96.9 MM HG (ref 80–100)
POTASSIUM BLDA-SCNC: 4.33 MMOL/L (ref 3.5–5)
POTASSIUM BLDA-SCNC: 4.89 MMOL/L (ref 3.5–5)
POTASSIUM SERPL-SCNC: 3.5 MMOL/L (ref 3.5–5.2)
POTASSIUM SERPL-SCNC: 4.1 MMOL/L (ref 3.5–5.2)
PROT SERPL-MCNC: 5.9 G/DL (ref 6–8.5)
PROT SERPL-MCNC: 7.4 G/DL (ref 6–8.5)
PROT UR QL STRIP: ABNORMAL
PROTHROMBIN TIME: 15 SECONDS (ref 11.8–14.9)
RBC # BLD AUTO: 4.45 10*6/MM3 (ref 4.14–5.8)
RBC # BLD AUTO: 5.05 10*6/MM3 (ref 4.14–5.8)
RBC # UR STRIP: ABNORMAL /HPF
REF LAB TEST METHOD: ABNORMAL
SALICYLATES SERPL-MCNC: <0.3 MG/DL
SAO2 % BLDCOA: 92 % (ref 95–99)
SAO2 % BLDCOA: 96 % (ref 95–99)
SAO2 % BLDCOA: 99 % (ref 92–98.5)
SET MECH RESP RATE: 20
SODIUM BLDA-SCNC: 141.4 MMOL/L (ref 136–146)
SODIUM BLDA-SCNC: 143.5 MMOL/L (ref 136–146)
SODIUM SERPL-SCNC: 140 MMOL/L (ref 136–145)
SODIUM SERPL-SCNC: 145 MMOL/L (ref 136–145)
SP GR UR STRIP: 1.01 (ref 1–1.03)
SQUAMOUS #/AREA URNS HPF: ABNORMAL /HPF
TOTAL RATE: 20 BREATHS/MINUTE
TROPONIN T DELTA: 28 NG/L
TROPONIN T SERPL HS-MCNC: 18 NG/L
TROPONIN T SERPL HS-MCNC: 25 NG/L
TSH SERPL DL<=0.05 MIU/L-ACNC: 0.46 UIU/ML (ref 0.27–4.2)
TSH SERPL DL<=0.05 MIU/L-ACNC: 1.76 UIU/ML (ref 0.27–4.2)
UROBILINOGEN UR QL STRIP: ABNORMAL
VENTILATOR MODE: ABNORMAL
VT ON VENT VENT: 510 ML
WBC # UR STRIP: ABNORMAL /HPF
WBC NRBC COR # BLD: 11.33 10*3/MM3 (ref 3.4–10.8)
WBC NRBC COR # BLD: 16.21 10*3/MM3 (ref 3.4–10.8)
WHOLE BLOOD HOLD COAG: NORMAL
WHOLE BLOOD HOLD SPECIMEN: NORMAL

## 2023-09-27 PROCEDURE — 99254 IP/OBS CNSLTJ NEW/EST MOD 60: CPT | Performed by: STUDENT IN AN ORGANIZED HEALTH CARE EDUCATION/TRAINING PROGRAM

## 2023-09-27 PROCEDURE — 36600 WITHDRAWAL OF ARTERIAL BLOOD: CPT

## 2023-09-27 PROCEDURE — 94799 UNLISTED PULMONARY SVC/PX: CPT

## 2023-09-27 PROCEDURE — 25010000002 FENTANYL CITRATE (PF) 2500 MCG/50ML SOLUTION: Performed by: INTERNAL MEDICINE

## 2023-09-27 PROCEDURE — 80053 COMPREHEN METABOLIC PANEL: CPT | Performed by: INTERNAL MEDICINE

## 2023-09-27 PROCEDURE — 94761 N-INVAS EAR/PLS OXIMETRY MLT: CPT

## 2023-09-27 PROCEDURE — 82805 BLOOD GASES W/O2 SATURATION: CPT

## 2023-09-27 PROCEDURE — 99204 OFFICE O/P NEW MOD 45 MIN: CPT | Performed by: PSYCHIATRY & NEUROLOGY

## 2023-09-27 PROCEDURE — 84484 ASSAY OF TROPONIN QUANT: CPT | Performed by: EMERGENCY MEDICINE

## 2023-09-27 PROCEDURE — 84484 ASSAY OF TROPONIN QUANT: CPT | Performed by: INTERNAL MEDICINE

## 2023-09-27 PROCEDURE — 82009 KETONE BODYS QUAL: CPT | Performed by: EMERGENCY MEDICINE

## 2023-09-27 PROCEDURE — 82805 BLOOD GASES W/O2 SATURATION: CPT | Performed by: EMERGENCY MEDICINE

## 2023-09-27 PROCEDURE — 80179 DRUG ASSAY SALICYLATE: CPT | Performed by: EMERGENCY MEDICINE

## 2023-09-27 PROCEDURE — 83735 ASSAY OF MAGNESIUM: CPT | Performed by: EMERGENCY MEDICINE

## 2023-09-27 PROCEDURE — 82948 REAGENT STRIP/BLOOD GLUCOSE: CPT

## 2023-09-27 PROCEDURE — 99284 EMERGENCY DEPT VISIT MOD MDM: CPT

## 2023-09-27 PROCEDURE — 82375 ASSAY CARBOXYHB QUANT: CPT | Performed by: EMERGENCY MEDICINE

## 2023-09-27 PROCEDURE — 80307 DRUG TEST PRSMV CHEM ANLYZR: CPT | Performed by: EMERGENCY MEDICINE

## 2023-09-27 PROCEDURE — 25010000002 THIAMINE HCL 200 MG/2ML SOLUTION: Performed by: INTERNAL MEDICINE

## 2023-09-27 PROCEDURE — 71045 X-RAY EXAM CHEST 1 VIEW: CPT

## 2023-09-27 PROCEDURE — 83050 HGB METHEMOGLOBIN QUAN: CPT

## 2023-09-27 PROCEDURE — 74018 RADEX ABDOMEN 1 VIEW: CPT

## 2023-09-27 PROCEDURE — 84443 ASSAY THYROID STIM HORMONE: CPT | Performed by: INTERNAL MEDICINE

## 2023-09-27 PROCEDURE — 96368 THER/DIAG CONCURRENT INF: CPT

## 2023-09-27 PROCEDURE — 0 LEVETIRACETAM IN NACL 0.75% 1000 MG/100ML SOLUTION: Performed by: EMERGENCY MEDICINE

## 2023-09-27 PROCEDURE — 25010000002 PROPOFOL 10 MG/ML EMULSION: Performed by: EMERGENCY MEDICINE

## 2023-09-27 PROCEDURE — 70450 CT HEAD/BRAIN W/O DYE: CPT

## 2023-09-27 PROCEDURE — 80053 COMPREHEN METABOLIC PANEL: CPT | Performed by: EMERGENCY MEDICINE

## 2023-09-27 PROCEDURE — 82077 ASSAY SPEC XCP UR&BREATH IA: CPT | Performed by: EMERGENCY MEDICINE

## 2023-09-27 PROCEDURE — 85730 THROMBOPLASTIN TIME PARTIAL: CPT | Performed by: EMERGENCY MEDICINE

## 2023-09-27 PROCEDURE — 81001 URINALYSIS AUTO W/SCOPE: CPT | Performed by: EMERGENCY MEDICINE

## 2023-09-27 PROCEDURE — 82550 ASSAY OF CK (CPK): CPT | Performed by: EMERGENCY MEDICINE

## 2023-09-27 PROCEDURE — 95711 VEEG 2-12 HR UNMONITORED: CPT

## 2023-09-27 PROCEDURE — 36415 COLL VENOUS BLD VENIPUNCTURE: CPT

## 2023-09-27 PROCEDURE — 93010 ELECTROCARDIOGRAM REPORT: CPT | Performed by: INTERNAL MEDICINE

## 2023-09-27 PROCEDURE — 31500 INSERT EMERGENCY AIRWAY: CPT

## 2023-09-27 PROCEDURE — 93005 ELECTROCARDIOGRAM TRACING: CPT | Performed by: EMERGENCY MEDICINE

## 2023-09-27 PROCEDURE — 87040 BLOOD CULTURE FOR BACTERIA: CPT | Performed by: EMERGENCY MEDICINE

## 2023-09-27 PROCEDURE — 93005 ELECTROCARDIOGRAM TRACING: CPT

## 2023-09-27 PROCEDURE — 25010000002 LEVETRIRACETAM PER 10 MG: Performed by: INTERNAL MEDICINE

## 2023-09-27 PROCEDURE — 99285 EMERGENCY DEPT VISIT HI MDM: CPT

## 2023-09-27 PROCEDURE — 82140 ASSAY OF AMMONIA: CPT | Performed by: EMERGENCY MEDICINE

## 2023-09-27 PROCEDURE — 85027 COMPLETE CBC AUTOMATED: CPT | Performed by: INTERNAL MEDICINE

## 2023-09-27 PROCEDURE — 96375 TX/PRO/DX INJ NEW DRUG ADDON: CPT

## 2023-09-27 PROCEDURE — 25010000002 PROPOFOL 10 MG/ML EMULSION

## 2023-09-27 PROCEDURE — 84443 ASSAY THYROID STIM HORMONE: CPT | Performed by: EMERGENCY MEDICINE

## 2023-09-27 PROCEDURE — 83605 ASSAY OF LACTIC ACID: CPT | Performed by: EMERGENCY MEDICINE

## 2023-09-27 PROCEDURE — 83880 ASSAY OF NATRIURETIC PEPTIDE: CPT | Performed by: EMERGENCY MEDICINE

## 2023-09-27 PROCEDURE — 82375 ASSAY CARBOXYHB QUANT: CPT

## 2023-09-27 PROCEDURE — 85610 PROTHROMBIN TIME: CPT | Performed by: EMERGENCY MEDICINE

## 2023-09-27 PROCEDURE — 80143 DRUG ASSAY ACETAMINOPHEN: CPT | Performed by: EMERGENCY MEDICINE

## 2023-09-27 PROCEDURE — 96366 THER/PROPH/DIAG IV INF ADDON: CPT

## 2023-09-27 PROCEDURE — 5A1945Z RESPIRATORY VENTILATION, 24-96 CONSECUTIVE HOURS: ICD-10-PCS | Performed by: INTERNAL MEDICINE

## 2023-09-27 PROCEDURE — 82330 ASSAY OF CALCIUM: CPT | Performed by: INTERNAL MEDICINE

## 2023-09-27 PROCEDURE — 96365 THER/PROPH/DIAG IV INF INIT: CPT

## 2023-09-27 PROCEDURE — 94002 VENT MGMT INPAT INIT DAY: CPT

## 2023-09-27 PROCEDURE — 25010000002 ENOXAPARIN PER 10 MG: Performed by: INTERNAL MEDICINE

## 2023-09-27 PROCEDURE — 82803 BLOOD GASES ANY COMBINATION: CPT

## 2023-09-27 PROCEDURE — 51702 INSERT TEMP BLADDER CATH: CPT

## 2023-09-27 PROCEDURE — 83050 HGB METHEMOGLOBIN QUAN: CPT | Performed by: EMERGENCY MEDICINE

## 2023-09-27 PROCEDURE — 25010000002 PROPOFOL 10 MG/ML EMULSION: Performed by: INTERNAL MEDICINE

## 2023-09-27 PROCEDURE — 95718 EEG PHYS/QHP 2-12 HR W/VEEG: CPT | Performed by: PSYCHIATRY & NEUROLOGY

## 2023-09-27 PROCEDURE — 83690 ASSAY OF LIPASE: CPT | Performed by: EMERGENCY MEDICINE

## 2023-09-27 PROCEDURE — 25010000002 LORAZEPAM PER 2 MG: Performed by: EMERGENCY MEDICINE

## 2023-09-27 PROCEDURE — 85025 COMPLETE CBC W/AUTO DIFF WBC: CPT | Performed by: EMERGENCY MEDICINE

## 2023-09-27 PROCEDURE — 25010000002 MIDAZOLAM PER 1MG: Performed by: EMERGENCY MEDICINE

## 2023-09-27 PROCEDURE — 36600 WITHDRAWAL OF ARTERIAL BLOOD: CPT | Performed by: EMERGENCY MEDICINE

## 2023-09-27 RX ORDER — FENTANYL CITRATE 50 UG/ML
50 INJECTION, SOLUTION INTRAMUSCULAR; INTRAVENOUS
Status: DISCONTINUED | OUTPATIENT
Start: 2023-09-27 | End: 2023-09-28

## 2023-09-27 RX ORDER — LORAZEPAM 2 MG/ML
1 INJECTION INTRAMUSCULAR
Status: DISCONTINUED | OUTPATIENT
Start: 2023-09-27 | End: 2023-10-04

## 2023-09-27 RX ORDER — MULTIPLE VITAMINS W/ MINERALS TAB 9MG-400MCG
1 TAB ORAL DAILY
Status: DISCONTINUED | OUTPATIENT
Start: 2023-09-27 | End: 2023-10-06 | Stop reason: HOSPADM

## 2023-09-27 RX ORDER — LORAZEPAM 1 MG/1
4 TABLET ORAL
Status: DISCONTINUED | OUTPATIENT
Start: 2023-09-27 | End: 2023-10-04

## 2023-09-27 RX ORDER — MIDAZOLAM HYDROCHLORIDE 2 MG/2ML
4 INJECTION, SOLUTION INTRAMUSCULAR; INTRAVENOUS ONCE
Status: COMPLETED | OUTPATIENT
Start: 2023-09-27 | End: 2023-09-27

## 2023-09-27 RX ORDER — LORAZEPAM 2 MG/ML
1 INJECTION INTRAMUSCULAR EVERY 6 HOURS
Status: DISPENSED | OUTPATIENT
Start: 2023-09-28 | End: 2023-09-30

## 2023-09-27 RX ORDER — LORAZEPAM 2 MG/ML
4 INJECTION INTRAMUSCULAR
Status: DISCONTINUED | OUTPATIENT
Start: 2023-09-27 | End: 2023-10-04

## 2023-09-27 RX ORDER — FENTANYL CITRATE-0.9 % NACL/PF 10 MCG/ML
50-300 PLASTIC BAG, INJECTION (ML) INTRAVENOUS
Status: DISCONTINUED | OUTPATIENT
Start: 2023-09-27 | End: 2023-09-28

## 2023-09-27 RX ORDER — ONDANSETRON 2 MG/ML
4 INJECTION INTRAMUSCULAR; INTRAVENOUS EVERY 6 HOURS PRN
Status: DISCONTINUED | OUTPATIENT
Start: 2023-09-27 | End: 2023-10-06 | Stop reason: HOSPADM

## 2023-09-27 RX ORDER — LORAZEPAM 2 MG/ML
2 INJECTION INTRAMUSCULAR
Status: DISCONTINUED | OUTPATIENT
Start: 2023-09-27 | End: 2023-10-04

## 2023-09-27 RX ORDER — PROPOFOL 10 MG/ML
VIAL (ML) INTRAVENOUS
Status: COMPLETED
Start: 2023-09-27 | End: 2023-09-27

## 2023-09-27 RX ORDER — ENOXAPARIN SODIUM 100 MG/ML
40 INJECTION SUBCUTANEOUS EVERY 24 HOURS
Status: DISCONTINUED | OUTPATIENT
Start: 2023-09-27 | End: 2023-10-06 | Stop reason: HOSPADM

## 2023-09-27 RX ORDER — LABETALOL HYDROCHLORIDE 5 MG/ML
20 INJECTION, SOLUTION INTRAVENOUS
Status: DISCONTINUED | OUTPATIENT
Start: 2023-09-27 | End: 2023-10-06 | Stop reason: HOSPADM

## 2023-09-27 RX ORDER — POLYETHYLENE GLYCOL 3350 17 G/17G
17 POWDER, FOR SOLUTION ORAL DAILY PRN
Status: DISCONTINUED | OUTPATIENT
Start: 2023-09-27 | End: 2023-10-06 | Stop reason: HOSPADM

## 2023-09-27 RX ORDER — NICOTINE 21 MG/24HR
1 PATCH, TRANSDERMAL 24 HOURS TRANSDERMAL EVERY 24 HOURS
Status: DISCONTINUED | OUTPATIENT
Start: 2023-09-27 | End: 2023-10-06 | Stop reason: HOSPADM

## 2023-09-27 RX ORDER — LORAZEPAM 1 MG/1
2 TABLET ORAL
Status: DISCONTINUED | OUTPATIENT
Start: 2023-09-27 | End: 2023-10-04

## 2023-09-27 RX ORDER — ACETAMINOPHEN 325 MG/1
650 TABLET ORAL EVERY 4 HOURS PRN
Status: DISCONTINUED | OUTPATIENT
Start: 2023-09-27 | End: 2023-10-06 | Stop reason: HOSPADM

## 2023-09-27 RX ORDER — LORAZEPAM 1 MG/1
1 TABLET ORAL
Status: DISCONTINUED | OUTPATIENT
Start: 2023-09-27 | End: 2023-10-04

## 2023-09-27 RX ORDER — AMOXICILLIN 250 MG
2 CAPSULE ORAL 2 TIMES DAILY
Status: DISCONTINUED | OUTPATIENT
Start: 2023-09-27 | End: 2023-10-06 | Stop reason: HOSPADM

## 2023-09-27 RX ORDER — SODIUM CHLORIDE 9 MG/ML
100 INJECTION, SOLUTION INTRAVENOUS CONTINUOUS
Status: DISCONTINUED | OUTPATIENT
Start: 2023-09-27 | End: 2023-09-30

## 2023-09-27 RX ORDER — LEVETIRACETAM 500 MG/5ML
1000 INJECTION, SOLUTION, CONCENTRATE INTRAVENOUS EVERY 12 HOURS SCHEDULED
Status: DISCONTINUED | OUTPATIENT
Start: 2023-09-27 | End: 2023-10-01

## 2023-09-27 RX ORDER — LEVETIRACETAM 10 MG/ML
1000 INJECTION INTRAVASCULAR ONCE
Status: COMPLETED | OUTPATIENT
Start: 2023-09-27 | End: 2023-09-27

## 2023-09-27 RX ORDER — ROCURONIUM BROMIDE 10 MG/ML
1000 INJECTION, SOLUTION INTRAVENOUS ONCE
Status: COMPLETED | OUTPATIENT
Start: 2023-09-27 | End: 2023-09-27

## 2023-09-27 RX ORDER — FAMOTIDINE 10 MG/ML
20 INJECTION, SOLUTION INTRAVENOUS 2 TIMES DAILY
Status: DISCONTINUED | OUTPATIENT
Start: 2023-09-27 | End: 2023-10-01

## 2023-09-27 RX ORDER — ROCURONIUM BROMIDE 10 MG/ML
100 INJECTION, SOLUTION INTRAVENOUS ONCE
Status: DISCONTINUED | OUTPATIENT
Start: 2023-09-27 | End: 2023-09-27 | Stop reason: HOSPADM

## 2023-09-27 RX ORDER — ETOMIDATE 2 MG/ML
20 INJECTION INTRAVENOUS ONCE
Status: COMPLETED | OUTPATIENT
Start: 2023-09-27 | End: 2023-09-27

## 2023-09-27 RX ORDER — SODIUM CHLORIDE 9 MG/ML
40 INJECTION, SOLUTION INTRAVENOUS AS NEEDED
Status: DISCONTINUED | OUTPATIENT
Start: 2023-09-27 | End: 2023-10-06 | Stop reason: HOSPADM

## 2023-09-27 RX ORDER — SODIUM CHLORIDE 0.9 % (FLUSH) 0.9 %
10 SYRINGE (ML) INJECTION EVERY 12 HOURS SCHEDULED
Status: DISCONTINUED | OUTPATIENT
Start: 2023-09-27 | End: 2023-10-06 | Stop reason: HOSPADM

## 2023-09-27 RX ORDER — THIAMINE HYDROCHLORIDE 100 MG/ML
200 INJECTION, SOLUTION INTRAMUSCULAR; INTRAVENOUS EVERY 8 HOURS SCHEDULED
Status: COMPLETED | OUTPATIENT
Start: 2023-09-27 | End: 2023-10-02

## 2023-09-27 RX ORDER — BISACODYL 5 MG/1
5 TABLET, DELAYED RELEASE ORAL DAILY PRN
Status: DISCONTINUED | OUTPATIENT
Start: 2023-09-27 | End: 2023-10-06 | Stop reason: HOSPADM

## 2023-09-27 RX ORDER — NITROGLYCERIN 0.4 MG/1
0.4 TABLET SUBLINGUAL
Status: DISCONTINUED | OUTPATIENT
Start: 2023-09-27 | End: 2023-10-06 | Stop reason: HOSPADM

## 2023-09-27 RX ORDER — DEXMEDETOMIDINE HYDROCHLORIDE 4 UG/ML
.2-1.5 INJECTION, SOLUTION INTRAVENOUS
Status: DISCONTINUED | OUTPATIENT
Start: 2023-09-27 | End: 2023-09-28

## 2023-09-27 RX ORDER — FENTANYL CITRATE-0.9 % NACL/PF 10 MCG/ML
50-300 PLASTIC BAG, INJECTION (ML) INTRAVENOUS
Status: DISCONTINUED | OUTPATIENT
Start: 2023-09-27 | End: 2023-09-27 | Stop reason: HOSPADM

## 2023-09-27 RX ORDER — ACETAMINOPHEN 650 MG/1
650 SUPPOSITORY RECTAL EVERY 4 HOURS PRN
Status: DISCONTINUED | OUTPATIENT
Start: 2023-09-27 | End: 2023-10-06 | Stop reason: HOSPADM

## 2023-09-27 RX ORDER — CHLORHEXIDINE GLUCONATE ORAL RINSE 1.2 MG/ML
15 SOLUTION DENTAL EVERY 12 HOURS SCHEDULED
Status: DISCONTINUED | OUTPATIENT
Start: 2023-09-27 | End: 2023-09-28

## 2023-09-27 RX ORDER — LORAZEPAM 2 MG/ML
2 INJECTION INTRAMUSCULAR EVERY 6 HOURS
Status: DISPENSED | OUTPATIENT
Start: 2023-09-27 | End: 2023-09-28

## 2023-09-27 RX ORDER — SODIUM CHLORIDE 0.9 % (FLUSH) 0.9 %
10 SYRINGE (ML) INJECTION AS NEEDED
Status: DISCONTINUED | OUTPATIENT
Start: 2023-09-27 | End: 2023-09-27 | Stop reason: HOSPADM

## 2023-09-27 RX ORDER — SODIUM CHLORIDE 0.9 % (FLUSH) 0.9 %
10 SYRINGE (ML) INJECTION AS NEEDED
Status: DISCONTINUED | OUTPATIENT
Start: 2023-09-27 | End: 2023-10-06 | Stop reason: HOSPADM

## 2023-09-27 RX ORDER — BISACODYL 10 MG
10 SUPPOSITORY, RECTAL RECTAL DAILY PRN
Status: DISCONTINUED | OUTPATIENT
Start: 2023-09-27 | End: 2023-10-06 | Stop reason: HOSPADM

## 2023-09-27 RX ORDER — LORAZEPAM 2 MG/ML
2 INJECTION INTRAMUSCULAR ONCE
Status: COMPLETED | OUTPATIENT
Start: 2023-09-27 | End: 2023-09-27

## 2023-09-27 RX ADMIN — DEXMEDETOMIDINE HYDROCHLORIDE 0.2 MCG/KG/HR: 4 INJECTION, SOLUTION INTRAVENOUS at 13:14

## 2023-09-27 RX ADMIN — ETOMIDATE 20 MG: 40 INJECTION, SOLUTION INTRAVENOUS at 04:54

## 2023-09-27 RX ADMIN — Medication 50 MCG/HR: at 07:18

## 2023-09-27 RX ADMIN — LEVETIRACETAM 1000 MG: 1000 INJECTION, SOLUTION INTRAVENOUS at 05:41

## 2023-09-27 RX ADMIN — FAMOTIDINE 20 MG: 10 INJECTION INTRAVENOUS at 15:34

## 2023-09-27 RX ADMIN — LEVETIRACETAM 1000 MG: 100 INJECTION INTRAVENOUS at 15:33

## 2023-09-27 RX ADMIN — FENTANYL CITRATE 100 MCG/HR: 50 INJECTION, SOLUTION INTRAMUSCULAR; INTRAVENOUS at 13:13

## 2023-09-27 RX ADMIN — PROPOFOL INJECTABLE EMULSION 50 MCG/KG/MIN: 10 INJECTION, EMULSION INTRAVENOUS at 12:51

## 2023-09-27 RX ADMIN — NICOTINE 1 PATCH: 21 PATCH, EXTENDED RELEASE TRANSDERMAL at 15:34

## 2023-09-27 RX ADMIN — LORAZEPAM 2 MG: 2 INJECTION INTRAMUSCULAR; INTRAVENOUS at 04:50

## 2023-09-27 RX ADMIN — PROPOFOL INJECTABLE EMULSION 35 MCG/KG/MIN: 10 INJECTION, EMULSION INTRAVENOUS at 21:17

## 2023-09-27 RX ADMIN — DEXMEDETOMIDINE HYDROCHLORIDE 0.4 MCG/KG/HR: 4 INJECTION, SOLUTION INTRAVENOUS at 23:29

## 2023-09-27 RX ADMIN — SODIUM CHLORIDE 100 ML/HR: 9 INJECTION, SOLUTION INTRAVENOUS at 15:35

## 2023-09-27 RX ADMIN — SODIUM BICARBONATE 150 MEQ: 84 INJECTION INTRAVENOUS at 07:10

## 2023-09-27 RX ADMIN — CHLORHEXIDINE GLUCONATE 15 ML: 1.2 RINSE ORAL at 15:40

## 2023-09-27 RX ADMIN — SODIUM CHLORIDE 2000 ML: 9 INJECTION, SOLUTION INTRAVENOUS at 05:42

## 2023-09-27 RX ADMIN — ENOXAPARIN SODIUM 40 MG: 100 INJECTION SUBCUTANEOUS at 15:38

## 2023-09-27 RX ADMIN — PROPOFOL 5 MCG/KG/MIN: 10 INJECTION, EMULSION INTRAVENOUS at 05:24

## 2023-09-27 RX ADMIN — MULTIPLE VITAMINS W/ MINERALS TAB 1 TABLET: TAB at 15:32

## 2023-09-27 RX ADMIN — THIAMINE HYDROCHLORIDE 200 MG: 100 INJECTION, SOLUTION INTRAMUSCULAR; INTRAVENOUS at 15:34

## 2023-09-27 RX ADMIN — SENNOSIDES AND DOCUSATE SODIUM 2 TABLET: 50; 8.6 TABLET ORAL at 21:17

## 2023-09-27 RX ADMIN — FOLIC ACID 1 MG: 5 INJECTION, SOLUTION INTRAMUSCULAR; INTRAVENOUS; SUBCUTANEOUS at 16:14

## 2023-09-27 RX ADMIN — ROCURONIUM BROMIDE 90000 MCG: 10 SOLUTION INTRAVENOUS at 04:56

## 2023-09-27 RX ADMIN — THIAMINE HYDROCHLORIDE 200 MG: 100 INJECTION, SOLUTION INTRAMUSCULAR; INTRAVENOUS at 21:18

## 2023-09-27 RX ADMIN — FAMOTIDINE 20 MG: 10 INJECTION INTRAVENOUS at 21:17

## 2023-09-27 RX ADMIN — Medication 10 ML: at 21:18

## 2023-09-27 RX ADMIN — Medication 10 ML: at 15:37

## 2023-09-27 RX ADMIN — CHLORHEXIDINE GLUCONATE 15 ML: 1.2 RINSE ORAL at 21:18

## 2023-09-27 RX ADMIN — PROPOFOL INJECTABLE EMULSION 35 MCG/KG/MIN: 10 INJECTION, EMULSION INTRAVENOUS at 16:48

## 2023-09-27 RX ADMIN — LEVETIRACETAM 1000 MG: 100 INJECTION INTRAVENOUS at 21:17

## 2023-09-27 RX ADMIN — MIDAZOLAM HYDROCHLORIDE 4 MG: 1 INJECTION, SOLUTION INTRAMUSCULAR; INTRAVENOUS at 05:04

## 2023-09-27 RX ADMIN — PROPOFOL 50 MCG/KG/MIN: 10 INJECTION, EMULSION INTRAVENOUS at 09:33

## 2023-09-27 NOTE — CONSULTS
[facility]  [facility] TeleNeurology Consult Services    Stat Consult    Patient Name:   Agustin Stubbs  YOB: 1975  Identification Number:   MRN - [id number]  Date of Service:   09/27/2023 06:09:55    Diagnosis:        G40.911 - Intractable epilepsy with status epilepticus, unspecified epilepsy type (HCC)    Impression  The patient is a 47 year old man with a history of TBI, daily alcohol use, who presents with status epilepticus. Agree with Keppra load and sedation with propofol. Continue Keppra 1 g BID. Continue propofol sedation for 24 hours. Continue Ceribell monitor or continuous EEG if available. Check routine EEG if continuous EEG not available. Plan MRI brain with and without contrast when stable. If patient continues to have clinical or electrographic seizures or is not improving as expected, continuous EEG is advised.      Recommendations:  Our recommendations are outlined below.    Diagnostic Studies :  MRI brain w/wo contrastRoutine EEG    Medications :  Keppra 1 g BID    ----------------------------------------------------------------------------------------------------        Metrics:  [facility] Notification Time: 09/27/2023 06:08:26  Stamp Time: 09/27/2023 06:09:55  Callback Response Time: 09/27/2023 06:11:36    Primary Provider Notified of Diagnostic Impression and Management Plan on: 09/27/2023 06:44:20      CT HEAD:  Reviewed  bilateral frontal encephalomalacia left > right        ----------------------------------------------------------------------------------------------------    Chief Complaint:  seizure    History of Present Illness:  Patient is a 47 year old Male.  Patient presents to the hospital with multiple seizures. He had 2 seizures at home. EMS called and he had 2 subsequent seizures with EMS described as generalized stiffening lasting 90 seconds. Had another seizure in ER. Got 2 mg ativan, 2.5 mg versed, intubated in ER. Currently on propofol 5. Got  Keppra 1 g IV load. No further seizures. On Ceribell monitoring. No prior history of seizures. Does drink daily.      Past Medical History:       Hypertension       There is no history of Diabetes Mellitus       There is no history of Hyperlipidemia       There is no history of Seizures    Medications:    No Anticoagulant use   No Antiplatelet use  Reviewed EMR for current medications    Allergies:   Reviewed  Description: penicillin    Social History:  Smoking: No    Family History:    There is no family history of premature cerebrovascular disease pertinent to this consultation    ROS :  14 Points Review of Systems was performed and was negative except mentioned in HPI.    Past Surgical History:  There Is No Surgical History Contributory To Today’s Visit      Examination:  BP(182/91), Pulse(147),  1A: Level of Consciousness - Postures or Unresponsive + 3  1B: Ask Month and Age - Dysarthric/Intubated/ Trauma/Language Barrier + 1  1C: Blink Eyes & Squeeze Hands - Performs 0 Tasks + 2  2: Test Horizontal Extraocular Movements - Normal + 0  3: Test Visual Fields - No Visual Loss + 0  4: Test Facial Palsy (Use Grimace if Obtunded) - Normal symmetry + 0  5A: Test Left Arm Motor Drift - No Movement + 4  5B: Test Right Arm Motor Drift - No Movement + 4  6A: Test Left Leg Motor Drift - No Movement + 4  6B: Test Right Leg Motor Drift - No Movement + 4  7: Test Limb Ataxia (FNF/Heel-Shin) - No Ataxia + 0  8: Test Sensation - No Response and Quadriplegic + 2  9: Test Language/Aphasia - Mute/Global Aphasia: No Usable Speech/Auditory Comprehension + 3  10: Test Dysarthria - Intubated/Unable to Test + 0  11: Test Extinction/Inattention - No abnormality + 0    NIHSS Score: 27    Spoke with : ED MD        Patient / Family was informed the Neurology Consult would occur via TeleHealth consult by way of interactive audio and video telecommunications and consented to receiving care in this manner.    Patient is being evaluated for  possible acute neurologic impairment and high probability of imminent or life - threatening deterioration.I spent total of 35 minutes providing care to this patient, including time for face to face visit via telemedicine, review of medical records, imaging studies and discussion of findings with providers, the patient and / or family.      Dr Augie High      [facility]  For Inpatient follow-up with [facility] physician please call [facility] [phone]. This is not an outpatient service. Post hospital discharge, please contact hospital directly.

## 2023-09-27 NOTE — PROGRESS NOTES
[facility] Clinical Pharmacy Services: Enoxaparin Consult    Agustin R Enoc has a pharmacy consult to dose prophylactic enoxaparin per Dr. Monroe's request.     Indication: VTE Prophylaxis  Home Anticoagulation: none     Relevant clinical data and objective history reviewed:  47 y.o. male   93.2 kg (205 lb 7.5 oz)   Body mass index is 28.66 kg/m².   Results from last 7 days   Lab Units 09/27/23  0442   PLATELETS 10*3/mm3 362     Estimated Creatinine Clearance: 84.3 mL/min (by C-G formula based on SCr of 1.25 mg/dL).    Assessment/Plan    Will start patient on 40mg subcutaneous every 24 hours. Consult order will be discontinued but pharmacy will continue to follow.     Antonieta Jain, PharmD  Clinical Pharmacist

## 2023-09-27 NOTE — PROGRESS NOTES
Vent rate increased to 20 per verbal order (Mildred BLACKWELL) after 0600 blood gas.    Cayla, RONDA

## 2023-09-27 NOTE — H&P
Patient Care Team:  Indy Moffett APRN as PCP - General (Nurse Practitioner)  Meera Ballard APRN as Nurse Practitioner (Nurse Practitioner)      Subjective     Patient is a 47 y.o. male.  Asked admit by the neurology service patient transferred in [facility] for evaluation of status epilepticus and apparently he has a history of traumatic brain injury and is a daily alcohol abuser also smokes and he was intubated is on a ventilator so unable to provide any history wife is at bedside he has hypertension and he takes his blood pressure medication he had had trauma to believe about 7 years ago but no seizures he did take a new nutritional supplement that was L-arginine and L Citrulline yesterday.  He does drink about 4-5 beers per night he had a few she does not know exactly how many last night.  He and she fell asleep watching a movie she was awakened early this morning by him seizing she said his arms and legs were both rigid down and shaking.  He has been trying to stop smoking he is down to about a pack a day used to be much heavier he is not known to have any COPD or breathing difficulties other than she says that she thinks he has sleep apnea, stopping breathing for short periods at night.  She is not aware of any medication allergies he has not had any real residuals from his traumatic brain injury other than he may be a little more labile emotionally than he used to be that he does not use any illicit drugs.  No known heart liver kidney disease no bleeding diatheses..  Wife does note that he had mentioned a little headache the last couple of days they thought it was sinus related.      Review of Systems:  Unable to obtain      History  Past Medical History:   Diagnosis Date    Chronic allergic rhinitis     High blood pressure     Hyperlipemia     Hypertension     No pertinent past medical history      Past Surgical History:   Procedure Laterality Date    COLONOSCOPY      COLONOSCOPY N/A  "12/20/2021    Procedure: COLONOSCOPY with possible biopies.;  Surgeon: Aaron Perla MD;  Location: [facility] ENDOSCOPY;  Service: General;  Laterality: N/A;  COLON POLYPS     Social History     Socioeconomic History    Marital status:    Tobacco Use    Smoking status: Every Day     Packs/day: 0.25     Types: Cigarettes     Passive exposure: Current    Smokeless tobacco: Never    Tobacco comments:     CURRENT EVERY DAY SMOKER, SMOKED 21-30 YEARS   Vaping Use    Vaping Use: Never used   Substance and Sexual Activity    Alcohol use: Yes     Comment: 4 DRINKS PER DAY, HAS BEEN DRINKING FOR 21-30 YEARS    Drug use: Not Currently     Comment: \"Long time ago party drugs\"    Sexual activity: Defer     Family History   Problem Relation Age of Onset    Cancer Mother     Diabetes Mother     Other Father         RENAL CALCULUS    Diabetes Father     Cancer Brother     Breast cancer Other 30        AUNT    Lung cancer Other         UNCLE    Malig Hyperthermia Neg Hx          Allergies:  Penicillins    Medications:  Prior to Admission medications    Medication Sig Start Date End Date Taking? Authorizing Provider   fenofibrate 160 MG tablet TAKE ONE TABLET BY MOUTH DAILY 4/24/23   Indy Moffett APRN   fluticasone (FLONASE) 50 MCG/ACT nasal spray 2 sprays into the nostril(s) as directed by provider Daily. 5/11/23   Indy Moffett APRN   Lactobacillus (PROBIOTIC ACIDOPHILUS PO) Take  by mouth.    Provider, MD Roxanne   lisinopril-hydrochlorothiazide (PRINZIDE,ZESTORETIC) 10-12.5 MG per tablet TAKE ONE TABLET BY MOUTH DAILY 7/24/23   Indy Moffett APRN   multivitamin with minerals tablet tablet Take 1 tablet by mouth Daily.    Provider, MD Roxanne   nicotine (Nicoderm CQ) 21 MG/24HR patch Place 1 patch on the skin as directed by provider Daily. 8/29/23   Indy Moffett APRN   omeprazole (priLOSEC) 40 MG capsule Take 1 capsule by mouth Daily. 2/2/22   Indy Moffett APRN       No current facility-administered " medications for this encounter.      Objective     Vital Signs  Vital Sign Min/Max for last 24 hours  Temp  Min: 97.7 °F (36.5 °C)  Max: 97.9 °F (36.6 °C)   BP  Min: 105/69  Max: 186/96   Pulse  Min: 99  Max: 149   Resp  Min: 16  Max: 27   SpO2  Min: 92 %  Max: 97 %   Flow (L/min)  Min: 2  Max: 2   Weight  Min: 91.9 kg (202 lb 9.6 oz)  Max: 93.2 kg (205 lb 7.5 oz)     No intake or output data in the 24 hours ending 09/27/23 1144  No intake/output data recorded.  Last Weight and Admission Weight        09/27/23  1114   Weight: 93.2 kg (205 lb 7.5 oz)     Flowsheet Rows      Flowsheet Row First Filed Value   Admission Height --   Admission Weight 93.2 kg (205 lb 7.5 oz) Documented at 09/27/2023 1114            Body mass index is 28.66 kg/m².           Physical Exam:  General Appearance: Well-developed white male he is orally intubated with an 8 endotracheal tube at 26 cm at the lips.  He is on assist-control of 20 tidal volume 400 he is 70 inches tall per his wife, FiO2 is 50% PEEP is 5 cm SPO2 is 100 end-tidal CO2 is about 43 he is sedated with propofol at 50 mics and fentanyl at 100 mics per hour  Eyes: Conjunctiva are injected pupils are pinpoint bilaterally  ENT: I do not see any definite bite marks on his tongue mucous membranes are little dry  Neck: Trachea midline no crepitance no visible jugular venous tension no palpable masses  Lungs: There are symmetric nonlabored expansion  Cardiac: Regular rate rhythm no murmur  Abdomen: Soft nontender no palpable hepatosplenomegaly or masses  : Grossly normal  Musculoskeletal: Looks grossly normal  Skin: Warm and dry no jaundice no petechiae  Neuro: He is heavily sedated he is not following any commands but he is withdrawing all 4 extremities to fairly light noxious stimuli  Extremities/P Vascular: No clubbing no cyanosis no edema palpable radial and dorsalis pedis pulses  MSE: Unable to assess      Labs:  Results from last 7 days   Lab Units 09/27/23  0609  09/27/23 0444 09/27/23 0442   GLUCOSE mg/dL  --   --  252*   GLUCOSE, ARTERIAL mg/dL 272* 245*  --    SODIUM mmol/L  --   --  140   SODIUM, ARTERIAL mmol/L 141.4 143.5  --    POTASSIUM mmol/L  --   --  4.1   MAGNESIUM mg/dL  --   --  2.4   CO2 mmol/L  --   --  5.3*   CHLORIDE mmol/L  --   --  99   ANION GAP mmol/L  --   --  35.7*   CREATININE mg/dL  --   --  1.25   BUN mg/dL  --   --  12   BUN / CREAT RATIO   --   --  9.6   CALCIUM mg/dL  --   --  9.8   ALK PHOS U/L  --   --  117   TOTAL PROTEIN g/dL  --   --  7.4   ALT (SGPT) U/L  --   --  30   AST (SGOT) U/L  --   --  28   BILIRUBIN mg/dL  --   --  0.3   ALBUMIN g/dL  --   --  5.1   GLOBULIN gm/dL  --   --  2.3     Estimated Creatinine Clearance: 84.3 mL/min (by C-G formula based on SCr of 1.25 mg/dL).      Results from last 7 days   Lab Units 09/27/23  0442   WBC 10*3/mm3 16.21*   RBC 10*6/mm3 5.05   HEMOGLOBIN g/dL 16.9   HEMATOCRIT % 50.9   MCV fL 100.8*   MCH pg 33.5*   MCHC g/dL 33.2   RDW % 13.5   RDW-SD fl 50.4   MPV fL 9.1   PLATELETS 10*3/mm3 362   NEUTROPHIL % % 40.4*   LYMPHOCYTE % % 44.2   MONOCYTES % % 9.3   EOSINOPHIL % % 2.0   BASOPHIL % % 1.1   IMM GRAN % % 3.0*   NEUTROS ABS 10*3/mm3 6.56   LYMPHS ABS 10*3/mm3 7.16*   MONOS ABS 10*3/mm3 1.51*   EOS ABS 10*3/mm3 0.32   BASOS ABS 10*3/mm3 0.18   IMMATURE GRANS (ABS) 10*3/mm3 0.48*   NRBC /100 WBC 0.1     Results from last 7 days   Lab Units 09/27/23  0609   PH, ARTERIAL pH units 6.981*   PO2 ART mm Hg 118.2*   PCO2, ARTERIAL mm Hg 61.6*   HCO3 ART mmol/L 14.2*     Results from last 7 days   Lab Units 09/27/23  0842 09/27/23 0442   CK TOTAL U/L  --  188   HSTROP T ng/L 46* 18*     Results from last 7 days   Lab Units 09/27/23 0442   PROBNP pg/mL <36.0     Results from last 7 days   Lab Units 09/27/23 0442   TSH uIU/mL 1.760     Results from last 7 days   Lab Units 09/27/23  0842 09/27/23  0609 09/27/23  0444 09/27/23 0443   LACTATE mmol/L 1.4  --   --  25.1*   LACTATE, ARTERIAL mmol/L  --   9.22* 20.26*  --      Results from last 7 days   Lab Units 09/27/23  0442   INR  1.17*     Microbiology Results (last 10 days)       ** No results found for the last 240 hours. **              Diagnostics:  CT Head Without Contrast    Result Date: 9/27/2023  PROCEDURE: CT HEAD WO CONTRAST  COMPARISON: 9/1/2019.  INDICATIONS: 47-year-old male with seizures.  PROTOCOL:   Standard CT imaging protocol performed.    RADIATION:   Total DLP: 1,145.2 mGy*cm   MA and/or KV were/was adjusted to minimize radiation dose.    TECHNIQUE: After obtaining the patient's consent, 146 CT images were obtained without non-ionic intravenous contrast material.  FINDINGS: Encephalomalacia involves the inferior frontal lobes, greater on the left than the right, as well as the anterior temporal lobes, probably greater on the left, as well.  These findings may be related to chronic posttraumatic changes, such as hemorrhagic contusions.  Please correlate clinically.  No acute intracranial hemorrhage is seen.  No acute infarct.  No midline shift or acute intracranial herniation syndrome is seen.  No acute skull fracture is identified.  Mild-to-moderate age-indeterminate mucosal thickening involves the imaged paranasal sinuses.  No air-fluid interfaces are seen within the imaged paranasal sinuses.  No significant acute findings are seen with regard to the imaged orbits or middle ear clefts.  There is nonspecific diffuse prominence of the nasopharyngeal mucosal space which may be related to lymphoid hyperplasia.       No acute intracranial hemorrhage is seen.  No acute infarct.  No significant interval change is appreciated since the 09/01 2019 CT study with bifrontal and bi-temporal, left greater than right, posttraumatic encephalomalacia suspected, as detailed above.     Please note that portions of this note were completed with a voice recognition program.  PAPI SHERWOOD JR, MD       Electronically Signed and Approved By: PAPI SHERWOOD JR, MD  on 9/27/2023 at 5:51              XR Chest 1 View    Result Date: 9/27/2023  PROCEDURE: XR CHEST 1 VW  COMPARISON: 2/6/2023.  INDICATIONS: ET TUBE PLACEMENT; IMAGING FOLLOW-UP.  FINDINGS:  A single AP supine portable chest radiograph is provided for review.  There has been interval endotracheal (ET) intubation.  The distal tip of the endotracheal (ET) tube is projected about 2.9 cm above the bandar.  It is in satisfactory position.  There are low lung volumes.  No acute infiltrate is seen.  Degenerative changes involve the imaged spine and the bilateral shoulders.  No pneumothorax is seen.  No pleural effusion.  There may be thoracic aortic atherosclerotic change.        The endotracheal (ET) tube is in satisfactory position.  No acute infiltrate is seen.  Probably no cardiac enlargement.      Please note that portions of this note were completed with a voice recognition program.  PAPI SHERWOOD JR, MD       Electronically Signed and Approved By: PAPI SHERWOOD JR, MD on 9/27/2023 at 5:42              XR Abdomen KUB    Result Date: 9/27/2023  PROCEDURE: XR ABDOMEN KUB  COMPARISON: [facility], , XR ABDOMEN KUB, 9/27/2023, 7:48.  INDICATIONS: Position of OG tube after repositioning per previous Xray recommendations  FINDINGS:  Nasogastric tube tip has been advanced with the tip lying in the area of the stomach.  Extraneous objects project over the abdomen obscuring the detail to some degree.  Bowel gas pattern is nonspecific.        1. Enteric tube has been advanced with the tip in the area of the stomach.     GLADYS NORWOOD MD       Electronically Signed and Approved By: GLADYS NORWOOD MD on 9/27/2023 at 9:31             XR Abdomen KUB    Result Date: 9/27/2023  PROCEDURE: XR ABDOMEN KUB  COMPARISON: None  INDICATIONS: OG placement verification  FINDINGS:  The NG tube appears looped in the distal thoracic esophagus near the gastroesophageal junction.  The visualized bowel gas pattern is normal.        1.  NG tube, as above.  Recommend retracting the tube approximately 5 cm and then advancing the tube 8 cm for better positioning.     Jamil Nunez M.D.       Electronically Signed and Approved By: Jamil Nunez M.D. on 9/27/2023 at 8:13               EKG reviewed and sinus tachycardia right bundle branch block there is a little bit of lateral ST elevation possible  Did independently review chest x-ray and CT head from [facility]     Status epilepticus neuro service to consult and provide treatment guidelines.  He is on propofol we will continue some Keppra pending neurology's recommendations.  Respiratory failure he had a severe combined metabolic and respiratory acidosis earlier.  He is getting really small tidal volumes about 5 mL/kg have increased him to 7 and will get follow-up blood gas.  Lactic acidosis probably secondary to seizure corrected quickly  Elevated troponin that did rise I am going to get a follow-up EKG and repeat his troponin  Alcohol use abuse he still had a mild level of alcohol present consistent with the history wife gives we will have to certainly watch for alcohol withdrawal put him on the alcohol withdrawal protocol and administer thiamine and folate  Tobacco abuse we will put on a nicotine patch she needs to stop smoking      Gildardo Monroe Jr, MD  09/27/23  11:44 EDT    Time: Critical care time 49 minutes

## 2023-09-27 NOTE — CONSULTS
Neurology Consult Note    Consult Date: 9/27/2023    Referring MD: Tobin Levy MD    Reason for Consult I have been asked to see the patient in neurological consultation to render advice and opinion regarding multiple seizure-like activity concerning for status epilepticus.    Agustin Stubbs is a 47 y.o. white male with known diagnosis of hypertension, hyperlipidemia, prior intracerebral hemorrhage with unknown residual deficit, alcohol abuse, tobacco abuse presented to [facility] as a transfer from [facility] for continuous EEG monitoring with concern about status epilepticus.  Patient currently intubated and sedated with propofol and fentanyl.  History was obtained from his wife and 2 daughters.  Wife stated that earlier this morning the patient had a generalized tonic-clonic seizure out of sleep, she was not sure if his head was turned to one side, not sure if the eyes were open or closed, generalized tonic-clonic seizure lasted for about 90 minutes but the patient did not regain consciousness, by the time EMS arrived patient had another seizure with a total of 5 seizures.  He was taken to the outside hospital had a CT head which showed no acute abnormality but did show left frontal encephalomalacia likely from his prior ICH.  Patient neuro exam was very limited due to sedation and intubation, as per wife and nursing staff when the patient arrived he was agitated and moving all extremities but did not follow commands.  As per the wife the patient is a heavy drinker drinks about 5 beers a day for 20+ years, his last drink was last night.    Past Medical History:   Diagnosis Date    Chronic allergic rhinitis     High blood pressure     Hyperlipemia     Hypertension     No pertinent past medical history        Exam  Temp 97.9 °F (36.6 °C)   Wt 93.2 kg (205 lb 7.5 oz)   BMI 28.66 kg/m²   Neuro exam limited by sedation and intubation  Gen: Sedated and intubated, vitals reviewed  MS: Did not  respond to voice or sternal rub, did not follow any commands.    CN: Pupils 1 mm with sluggish reaction, VOR present, does not blink to threat bilaterally, does not breathe over the ventilator.  Motor: No withdrawal to painful stimuli throughout, normal tone    DATA:    Lab Results   Component Value Date    GLUCOSE 272 (H) 09/27/2023    CALCIUM 9.8 09/27/2023    .4 09/27/2023    K 4.1 09/27/2023    CO2 5.3 (L) 09/27/2023    CL 99 09/27/2023    BUN 12 09/27/2023    CREATININE 1.25 09/27/2023    BCR 9.6 09/27/2023    ANIONGAP 35.7 (H) 09/27/2023     Lab Results   Component Value Date    WBC 16.21 (H) 09/27/2023    HGB 16.9 09/27/2023    HCT 50.9 09/27/2023    .8 (H) 09/27/2023     09/27/2023       Lab review: CBC with WBC 16.2, glucose 272, sodium 141, BUN 12, creatinine 1.2    Imaging review: Personally reviewed the CT scan of the head from the outside hospital which showed no acute abnormality, there was encephalomalacia of the left frontal.    Diagnoses:  New onset seizure like activity concerning for status epilepticus from epilepsy versus alcohol withdrawal although this is less likely as the patient did drink alcohol yesterday.        PLAN:   1.  Patient currently on propofol and fentanyl which can help with his seizures.  2.  Continue Keppra 1 g twice daily  3.  Continuous EEG while connected to cEEG we can start weaning of sedation and adjust his antiseizure medications as needed depending on the read.  4.  We will discuss seizure precautions with the patient and family later during this admission    We are having upgrade to the EEG system today at 4 PM, the technician will do 21 minutes now then hooked to continuous EEG after 9 PM.  I would keep him on sedation until the continuous EEG gets going after 9 PM then start weaning of sedation.    Discussed with ICU attending Dr. Monroe    Medical Decision Making for this neurology consultation consists of the following:  Review of previous  chart, including H/P, provider and nursing notes as applicable.  Review of medications and vital signs.  Review of previous labs, neuroimaging, and additional relevant diagnostics.   Interpretation of laboratory, imaging, and other diagnostic results  Discussion with other providers and family   Total face-to-face/floor time: 60 minutes.

## 2023-09-27 NOTE — ED PROVIDER NOTES
Time: 6:31 AM EDT  Date of encounter:  9/27/2023  Independent Historian/Clinical History and Information was obtained by:   Family and EMS    History is limited by: Altered Mental Status    Chief Complaint: seizure      History of Present Illness:  Patient is a 47 y.o. year old male who presents to the emergency department for evaluation of Seizure.  According to report patient has had multiple back-to-back seizures with no recovery in between.  Per family patient has no history of seizures.  He does have a history of traumatic brain injury 5 years ago and had intracranial hemorrhage.  Wife also reports daily alcohol use but denies any history of alcohol withdrawal.  History obtained from wife and EMS.    HPI    Patient Care Team  Primary Care Provider: Indy Moffett APRN    Past Medical History:     Allergies   Allergen Reactions    Penicillins Unknown - High Severity     Past Medical History:   Diagnosis Date    Chronic allergic rhinitis     High blood pressure     Hyperlipemia     Hypertension     No pertinent past medical history      Past Surgical History:   Procedure Laterality Date    COLONOSCOPY      COLONOSCOPY N/A 12/20/2021    Procedure: COLONOSCOPY with possible biopies.;  Surgeon: Aaron Perla MD;  Location: [facility] ENDOSCOPY;  Service: General;  Laterality: N/A;  COLON POLYPS     Family History   Problem Relation Age of Onset    Cancer Mother     Diabetes Mother     Other Father         RENAL CALCULUS    Diabetes Father     Cancer Brother     Breast cancer Other 30        AUNT    Lung cancer Other         UNCLE    Malig Hyperthermia Neg Hx        Home Medications:  Prior to Admission medications    Medication Sig Start Date End Date Taking? Authorizing Provider   fenofibrate 160 MG tablet TAKE ONE TABLET BY MOUTH DAILY 4/24/23   Indy Moffett APRN   fluticasone (FLONASE) 50 MCG/ACT nasal spray 2 sprays into the nostril(s) as directed by provider Daily. 5/11/23   Indy Moffett APRN  "  Lactobacillus (PROBIOTIC ACIDOPHILUS PO) Take  by mouth.    Provider, MD Roxanne   lisinopril-hydrochlorothiazide (PRINZIDE,ZESTORETIC) 10-12.5 MG per tablet TAKE ONE TABLET BY MOUTH DAILY 7/24/23   Indy Moffett APRN   multivitamin with minerals tablet tablet Take 1 tablet by mouth Daily.    Provider, MD Roxanne   nicotine (Nicoderm CQ) 21 MG/24HR patch Place 1 patch on the skin as directed by provider Daily. 8/29/23   Indy Moffett APRN   omeprazole (priLOSEC) 40 MG capsule Take 1 capsule by mouth Daily. 2/2/22   Indy Moffett APRN        Social History:   Social History     Tobacco Use    Smoking status: Every Day     Packs/day: 0.25     Types: Cigarettes     Passive exposure: Current    Smokeless tobacco: Never    Tobacco comments:     CURRENT EVERY DAY SMOKER, SMOKED 21-30 YEARS   Vaping Use    Vaping Use: Never used   Substance Use Topics    Alcohol use: Yes     Comment: 4 DRINKS PER DAY, HAS BEEN DRINKING FOR 21-30 YEARS    Drug use: Not Currently     Comment: \"Long time ago party drugs\"         Review of Systems:  Review of Systems   Unable to perform ROS: Patient unresponsive      Physical Exam:  /69   Pulse 99   Temp 97.7 °F (36.5 °C) (Bladder)   Resp 18   Ht 180.3 cm (71\")   Wt 91.9 kg (202 lb 9.6 oz)   SpO2 97%   BMI 28.26 kg/m²     Physical Exam  Constitutional:       Comments: unresponsive   HENT:      Head: Normocephalic and atraumatic.      Right Ear: External ear normal.      Left Ear: External ear normal.      Nose: Nose normal.      Mouth/Throat:      Mouth: Mucous membranes are moist.   Eyes:      Conjunctiva/sclera: Conjunctivae normal.      Pupils: Pupils are equal, round, and reactive to light.   Cardiovascular:      Rate and Rhythm: Tachycardia present.   Pulmonary:      Breath sounds: Rhonchi present.   Abdominal:      General: There is no distension.      Palpations: There is no mass.   Musculoskeletal:      Cervical back: Normal range of motion.      Right " lower leg: No edema.      Left lower leg: No edema.   Skin:     General: Skin is warm.      Capillary Refill: Capillary refill takes less than 2 seconds.   Neurological:      Comments: Neuro exam limited patient is unresponsive                Procedures:  Intubation    Date/Time: 9/28/2023 9:27 AM  Performed by: Tobin Levy MD  Authorized by: Tobin Levy MD     Consent:     Consent obtained:  Emergent situation  Universal protocol:     Patient identity confirmed:  Arm band  Pre-procedure details:     Indication: failure to oxygenate, failure to protect airway, failure to ventilate and predicted clinical deterioration      Patient status:  Unresponsive    Look externally: no concerns      Mouth opening - incisor distance:  3 or more finger widths    Hyoid-mental distance: 3 or more finger widths      Hyoid-thyroid distance: 2 or more finger widths      Mallampati score:  II    Obstruction: none      Neck mobility: normal      Pharmacologic strategy: RSI      Induction agents:  Etomidate    Paralytics:  Rocuronium  Procedure details:     Preoxygenation:  Bag valve mask    CPR in progress: no      Intubation method:  Oral    Intubation technique: video assisted      Laryngoscope blade:  Mac 4    Bougie used: no      Grade view: II      Tube size (mm):  8.0    Tube type:  Cuffed    Number of attempts:  1    Ventilation between attempts: no      Tube visualized through cords: yes    Placement assessment:     ETT at teeth/gumline (cm):  23    Tube secured with:  ETT rose    Breath sounds:  Equal    Placement verification: chest rise, colorimetric ETCO2, CXR verification, direct visualization, equal breath sounds and tube exhalation      CXR findings:  Appropriate position  Post-procedure details:     Procedure completion:  Tolerated well, no immediate complications      Medical Decision Making:      Comorbidities that affect care:    TBI, Hypertension, Smoking    External Notes reviewed:    Previous  Clinic Note: Patient was seen by his PCP on 8/7/2023 for hypertension, hyperlipidemia and GERD.      The following orders were placed and all results were independently analyzed by me:  Orders Placed This Encounter   Procedures    Intubation    Blood Culture - Blood,    Blood Culture - Blood,    XR Chest 1 View    CT Head Without Contrast    XR Abdomen KUB    XR Abdomen KUB    Hunter Draw    Comprehensive Metabolic Panel    CBC Auto Differential    ABG with Co-Ox and Electrolytes    Lactic Acid, Plasma    CK    Ammonia    aPTT    Lipase    Magnesium    Protime-INR    TSH    Urinalysis With Culture If Indicated - Urine, Clean Catch    BNP    High Sensitivity Troponin T    Acetaminophen Level    Ethanol    Salicylate Level    Urine Drug Screen - Urine, Clean Catch    STAT Lactic Acid, Reflex    Urinalysis, Microscopic Only - Urine, Clean Catch    Acetone    ABG with Co-Ox and Electrolytes    High Sensitivity Troponin T 2Hr    Continuous Pulse Oximetry    Vital Signs    Insert Indwelling Urinary Catheter    POC Glucose Once    POC Glucose Once    ECG 12 Lead Syncope    ECG 12 Lead Tachycardia    CBC & Differential    Green Top (Gel)    Lavender Top    Gold Top - SST    Light Blue Top       Medications Given in the Emergency Department:  Medications   etomidate (AMIDATE) injection 20 mg (20 mg Intravenous Given 9/27/23 0454)   levETIRAcetam in NaCl 0.75% (KEPPRA) IVPB 1,000 mg (0 mg Intravenous Stopped 9/27/23 0604)   LORazepam (ATIVAN) injection 2 mg (2 mg Intravenous Given 9/27/23 0450)   rocuronium (ZEMURON) injection 90,000 mcg (90,000 mcg Intravenous Given 9/27/23 0456)   Midazolam HCl (PF) (VERSED) injection 4 mg (4 mg Intravenous Given 9/27/23 0504)   sodium chloride 0.9 % bolus 2,000 mL (0 mL Intravenous Stopped 9/27/23 0633)        ED Course:         Labs:    Lab Results (last 24 hours)       Procedure Component Value Units Date/Time    POC Glucose Once [[id number]]  (Normal) Collected: 09/27/23 1122     Specimen: Blood Updated: 09/27/23 1126     Glucose 95 mg/dL     Blood Gas, Arterial - [[id number]]  (Abnormal) Collected: 09/27/23 1323    Specimen: Arterial Blood Updated: 09/27/23 1327     Site Left Radial     Malik's Test Positive     pH, Arterial 7.379 pH units      pCO2, Arterial 40.6 mm Hg      pO2, Arterial 134.3 mm Hg      HCO3, Arterial 24.0 mmol/L      Base Excess, Arterial -1.1 mmol/L      Comment: Serial Number: [id number]:  [id number]        O2 Saturation, Arterial 99.0 %      A-a DO2 0.5 mmHg      CO2 Content 25.2 mmol/L      Barometric Pressure for Blood Gas 749.5000 mmHg      Modality Adult Vent     FIO2 40 %      Ventilator Mode VC     Set Tidal Volume 510     Set Mech Resp Rate 20     Rate 20 Breaths/minute      PEEP 5     Hemodilution No     Device Comment sat 98    TSH Rfx On Abnormal To Free T4 [[id number]]  (Normal) Collected: 09/27/23 1528    Specimen: Blood Updated: 09/27/23 1618     TSH 0.457 uIU/mL     Calcium, Ionized [[id number]]  (Abnormal) Collected: 09/27/23 1642    Specimen: Blood Updated: 09/27/23 1732     Ionized Calcium 1.13 mmol/L      Ionized Calcium 4.5 mg/dL     CBC (No Diff) [[id number]]  (Abnormal) Collected: 09/27/23 1642    Specimen: Blood Updated: 09/27/23 1735     WBC 11.33 10*3/mm3      RBC 4.45 10*6/mm3      Hemoglobin 14.8 g/dL      Hematocrit 43.6 %      MCV 98.0 fL      MCH 33.3 pg      MCHC 33.9 g/dL      RDW 13.6 %      RDW-SD 49.6 fl      MPV 8.7 fL      Platelets 226 10*3/mm3     Comprehensive Metabolic Panel [[id number]]  (Abnormal) Collected: 09/27/23 1642    Specimen: Blood Updated: 09/27/23 1722     Glucose 98 mg/dL      BUN 13 mg/dL      Creatinine 0.88 mg/dL      Sodium 145 mmol/L      Potassium 3.5 mmol/L      Chloride 111 mmol/L      CO2 21.8 mmol/L      Calcium 8.2 mg/dL      Total Protein 5.9 g/dL      Albumin 4.0 g/dL      ALT (SGPT) 23 U/L      AST (SGOT) 29 U/L      Alkaline Phosphatase 55 U/L      Total Bilirubin 0.8 mg/dL      Globulin 1.9 gm/dL      A/G  Ratio 2.1 g/dL      BUN/Creatinine Ratio 14.8     Anion Gap 12.2 mmol/L      eGFR 106.1 mL/min/1.73     Narrative:      GFR Normal >60  Chronic Kidney Disease <60  Kidney Failure <15      High Sensitivity Troponin T [[id number]]  (Abnormal) Collected: 09/27/23 1642    Specimen: Blood Updated: 09/27/23 1719     HS Troponin T 25 ng/L     Narrative:      High Sensitive Troponin T Reference Range:  <10.0 ng/L- Negative Female for AMI  <15.0 ng/L- Negative Male for AMI  >=10 - Abnormal Female indicating possible myocardial injury.  >=15 - Abnormal Male indicating possible myocardial injury.   Clinicians would have to utilize clinical acumen, EKG, Troponin, and serial changes to determine if it is an Acute Myocardial Infarction or myocardial injury due to an underlying chronic condition.         POC Glucose Once [[id number]]  (Normal) Collected: 09/27/23 1758    Specimen: Blood Updated: 09/27/23 1802     Glucose 120 mg/dL     POC Glucose Once [[id number]]  (Normal) Collected: 09/27/23 2317    Specimen: Blood Updated: 09/27/23 2319     Glucose 108 mg/dL     POC Glucose Once [[id number]]  (Abnormal) Collected: 09/28/23 0513    Specimen: Blood Updated: 09/28/23 0513     Glucose 132 mg/dL              Imaging:    XR Chest 1 View    Result Date: 9/27/2023  Portable chest x-ray  HISTORY: Endotracheal tube placement.  TECHNIQUE: Portable chest x-ray is provided. No prior exam for comparison.  FINDINGS: Endotracheal tube 35 mm above the bandar. Nasogastric tube coursing off the caudad edge of the field-of-view. Cardiomediastinal silhouette is normal and lungs are clear.      Hardware as described.  This report was finalized on 9/27/2023 1:19 PM by Dr. Gerard Vora M.D.         Differential Diagnosis and Discussion:    Altered Mental Status: Based on the patient's signs and symptoms, differential diagnosis includes but is not limited to meningitis, stroke, sepsis, subarachnoid hemorrhage, intracranial bleeding, encephalitis, and  metabolic encephalopathy.  Seizure: Differential diagnosis includes but is not limited to meningitis, hypoglycemia, electrolyte abnormalities, intracranial hemorrhage, toxin induced, and pseudoseizure.    All labs were reviewed and interpreted by me.  All X-rays impressions were independently interpreted by me.  EKG was interpreted by me.  CT scan radiology impression was interpreted by me.    MDM  Number of Diagnoses or Management Options  Status epilepticus  Diagnosis management comments: Patient presented to the emergency department after a seizure.  On arrival he is tachycardic and hypertensive.  While in emergency department patient had another seizure.  Patient did not have any recovery between seizures.  For airway protection patient was intubated.  Patient tolerated procedure well with no immediate complications.  CT of his head was obtained that showed no acute finding.  Teleneurology was consulted and recommended continued EEG monitoring.  Unfortunately this is not available at this facility.  Patient was discussed with critical care physician at [facility] and he will be transferred there for further care.       Amount and/or Complexity of Data Reviewed  Clinical lab tests: reviewed  Tests in the radiology section of CPT®: reviewed  Review and summarize past medical records: yes  Independent visualization of images, tracings, or specimens: yes    Risk of Complications, Morbidity, and/or Mortality  Presenting problems: high  Diagnostic procedures: moderate  Management options: moderate         Critical Care Note: Total Critical Care time of 50 minutes. Total critical care time documented does not include time spent on separately billed procedures for services of nurses or physician assistants. I personally saw and examined the patient. I have reviewed all diagnostic interpretations and treatment plans as written. I was present for the key portions of any procedures performed and the inclusive time noted  in any critical care statement. Critical care time includes patient management by me, time spent at the patients bedside,  time to review lab and imaging results, discussing patient care, documentation in the medical record, and time spent with family or caregiver.    Patient Care Considerations:          Consultants/Shared Management Plan:    Patient was discussed with critical care physician at [facility] who accepted transfer.  Patient also discussed with teleneurology who recommended continuous EEG monitoring.    Social Determinants of Health:    Patient is independent, reliable, and has access to care.       Disposition and Care Coordination:    Transferred: Through independent evaluation of the patient's history, physical, and imperical data, the patient meets criteria to be transferred to another hospital for evaluation/admission.        Final diagnoses:   Status epilepticus        ED Disposition       ED Disposition   Transfer to Another Facility     Condition   --    Comment   --               This medical record created using voice recognition software.             Tobin Levy MD  09/28/23 0933

## 2023-09-27 NOTE — ED NOTES
Pt arrived via ems, pt has hx of TBI, ems called for seizures. Pt had two prier to ems arrival and two witnessed by ems, lasting about 90sec each. Pt was given 2.5mg of versed

## 2023-09-27 NOTE — ED NOTES
New bottle of propofol hung for extended EMS transport. Current bottle did not have enough to last for the length of transportation.

## 2023-09-28 ENCOUNTER — APPOINTMENT (OUTPATIENT)
Dept: NEUROLOGY | Facility: HOSPITAL | Age: 48
DRG: 100 | End: 2023-09-28
Payer: COMMERCIAL

## 2023-09-28 LAB
ALBUMIN SERPL-MCNC: 3.9 G/DL (ref 3.5–5.2)
ALBUMIN/GLOB SERPL: 1.7 G/DL
ALP SERPL-CCNC: 54 U/L (ref 39–117)
ALT SERPL W P-5'-P-CCNC: 21 U/L (ref 1–41)
ANION GAP SERPL CALCULATED.3IONS-SCNC: 9.2 MMOL/L (ref 5–15)
ARTERIAL PATENCY WRIST A: POSITIVE
AST SERPL-CCNC: 25 U/L (ref 1–40)
ATMOSPHERIC PRESS: 749 MMHG
BASE EXCESS BLDA CALC-SCNC: -2.5 MMOL/L (ref 0–2)
BDY SITE: ABNORMAL
BILIRUB SERPL-MCNC: 0.9 MG/DL (ref 0–1.2)
BUN SERPL-MCNC: 12 MG/DL (ref 6–20)
BUN/CREAT SERPL: 12.6 (ref 7–25)
CALCIUM SPEC-SCNC: 8.4 MG/DL (ref 8.6–10.5)
CHLORIDE SERPL-SCNC: 108 MMOL/L (ref 98–107)
CO2 BLDA-SCNC: 22.8 MMOL/L (ref 23–27)
CO2 SERPL-SCNC: 21.8 MMOL/L (ref 22–29)
CREAT SERPL-MCNC: 0.95 MG/DL (ref 0.76–1.27)
DEVICE COMMENT: ABNORMAL
EGFRCR SERPLBLD CKD-EPI 2021: 98.7 ML/MIN/1.73
GLOBULIN UR ELPH-MCNC: 2.3 GM/DL
GLUCOSE BLDC GLUCOMTR-MCNC: 104 MG/DL (ref 70–130)
GLUCOSE BLDC GLUCOMTR-MCNC: 105 MG/DL (ref 70–130)
GLUCOSE BLDC GLUCOMTR-MCNC: 132 MG/DL (ref 70–130)
GLUCOSE SERPL-MCNC: 124 MG/DL (ref 65–99)
HCO3 BLDA-SCNC: 21.7 MMOL/L (ref 22–28)
HEMODILUTION: NO
INHALED O2 CONCENTRATION: 21 %
MODALITY: ABNORMAL
O2 A-A PPRESDIFF RESPIRATORY: 0.6 MMHG
PCO2 BLDA: 35.4 MM HG (ref 35–45)
PEEP RESPIRATORY: 5 CM[H2O]
PH BLDA: 7.4 PH UNITS (ref 7.35–7.45)
PO2 BLDA: 66.7 MM HG (ref 80–100)
POTASSIUM SERPL-SCNC: 3.8 MMOL/L (ref 3.5–5.2)
PROT SERPL-MCNC: 6.2 G/DL (ref 6–8.5)
PSV: 10 CMH2O
QT INTERVAL: 305 MS
QT INTERVAL: 307 MS
QTC INTERVAL: 463 MS
QTC INTERVAL: 480 MS
SAO2 % BLDCOA: 93 % (ref 92–98.5)
SODIUM SERPL-SCNC: 139 MMOL/L (ref 136–145)
TOTAL RATE: 17 BREATHS/MINUTE
VENTILATOR MODE: ABNORMAL

## 2023-09-28 PROCEDURE — 82803 BLOOD GASES ANY COMBINATION: CPT

## 2023-09-28 PROCEDURE — 25010000002 LORAZEPAM PER 2 MG: Performed by: INTERNAL MEDICINE

## 2023-09-28 PROCEDURE — 99233 SBSQ HOSP IP/OBS HIGH 50: CPT | Performed by: STUDENT IN AN ORGANIZED HEALTH CARE EDUCATION/TRAINING PROGRAM

## 2023-09-28 PROCEDURE — 25010000002 PROPOFOL 10 MG/ML EMULSION: Performed by: INTERNAL MEDICINE

## 2023-09-28 PROCEDURE — 25010000002 LEVETRIRACETAM PER 10 MG: Performed by: INTERNAL MEDICINE

## 2023-09-28 PROCEDURE — 36600 WITHDRAWAL OF ARTERIAL BLOOD: CPT

## 2023-09-28 PROCEDURE — 94799 UNLISTED PULMONARY SVC/PX: CPT

## 2023-09-28 PROCEDURE — 94761 N-INVAS EAR/PLS OXIMETRY MLT: CPT

## 2023-09-28 PROCEDURE — 95714 VEEG EA 12-26 HR UNMNTR: CPT

## 2023-09-28 PROCEDURE — 95714 VEEG EA 12-26 HR UNMNTR: CPT | Performed by: STUDENT IN AN ORGANIZED HEALTH CARE EDUCATION/TRAINING PROGRAM

## 2023-09-28 PROCEDURE — 82948 REAGENT STRIP/BLOOD GLUCOSE: CPT

## 2023-09-28 PROCEDURE — 25010000002 FENTANYL CITRATE (PF) 2500 MCG/50ML SOLUTION: Performed by: INTERNAL MEDICINE

## 2023-09-28 PROCEDURE — 25010000002 ENOXAPARIN PER 10 MG: Performed by: INTERNAL MEDICINE

## 2023-09-28 PROCEDURE — 25010000002 THIAMINE HCL 200 MG/2ML SOLUTION: Performed by: INTERNAL MEDICINE

## 2023-09-28 PROCEDURE — 80053 COMPREHEN METABOLIC PANEL: CPT | Performed by: INTERNAL MEDICINE

## 2023-09-28 PROCEDURE — 94003 VENT MGMT INPAT SUBQ DAY: CPT

## 2023-09-28 RX ADMIN — FAMOTIDINE 20 MG: 10 INJECTION INTRAVENOUS at 21:03

## 2023-09-28 RX ADMIN — ENOXAPARIN SODIUM 40 MG: 100 INJECTION SUBCUTANEOUS at 14:36

## 2023-09-28 RX ADMIN — FENTANYL CITRATE 75 MCG/HR: 50 INJECTION, SOLUTION INTRAMUSCULAR; INTRAVENOUS at 01:05

## 2023-09-28 RX ADMIN — CHLORHEXIDINE GLUCONATE 15 ML: 1.2 RINSE ORAL at 09:39

## 2023-09-28 RX ADMIN — ACETAMINOPHEN 650 MG: 325 TABLET, FILM COATED ORAL at 21:16

## 2023-09-28 RX ADMIN — LORAZEPAM 2 MG: 2 INJECTION INTRAMUSCULAR; INTRAVENOUS at 01:04

## 2023-09-28 RX ADMIN — LORAZEPAM 2 MG: 2 INJECTION INTRAMUSCULAR; INTRAVENOUS at 06:14

## 2023-09-28 RX ADMIN — THIAMINE HYDROCHLORIDE 200 MG: 100 INJECTION, SOLUTION INTRAMUSCULAR; INTRAVENOUS at 06:14

## 2023-09-28 RX ADMIN — DEXMEDETOMIDINE HYDROCHLORIDE 1.2 MCG/KG/HR: 4 INJECTION, SOLUTION INTRAVENOUS at 08:43

## 2023-09-28 RX ADMIN — LORAZEPAM 1 MG: 2 INJECTION INTRAMUSCULAR; INTRAVENOUS at 18:30

## 2023-09-28 RX ADMIN — THIAMINE HYDROCHLORIDE 200 MG: 100 INJECTION, SOLUTION INTRAMUSCULAR; INTRAVENOUS at 21:04

## 2023-09-28 RX ADMIN — PROPOFOL INJECTABLE EMULSION 20 MCG/KG/MIN: 10 INJECTION, EMULSION INTRAVENOUS at 02:07

## 2023-09-28 RX ADMIN — FOLIC ACID 1 MG: 5 INJECTION, SOLUTION INTRAMUSCULAR; INTRAVENOUS; SUBCUTANEOUS at 09:38

## 2023-09-28 RX ADMIN — Medication 10 ML: at 21:04

## 2023-09-28 RX ADMIN — SODIUM CHLORIDE 100 ML/HR: 9 INJECTION, SOLUTION INTRAVENOUS at 01:34

## 2023-09-28 RX ADMIN — FAMOTIDINE 20 MG: 10 INJECTION INTRAVENOUS at 09:38

## 2023-09-28 RX ADMIN — THIAMINE HYDROCHLORIDE 200 MG: 100 INJECTION, SOLUTION INTRAMUSCULAR; INTRAVENOUS at 14:36

## 2023-09-28 RX ADMIN — DEXMEDETOMIDINE HYDROCHLORIDE 1.5 MCG/KG/HR: 4 INJECTION, SOLUTION INTRAVENOUS at 04:25

## 2023-09-28 RX ADMIN — LEVETIRACETAM 1000 MG: 100 INJECTION INTRAVENOUS at 21:03

## 2023-09-28 RX ADMIN — LEVETIRACETAM 1000 MG: 100 INJECTION INTRAVENOUS at 09:38

## 2023-09-28 RX ADMIN — ACETAMINOPHEN 650 MG: 325 TABLET, FILM COATED ORAL at 17:39

## 2023-09-28 RX ADMIN — Medication 10 ML: at 09:39

## 2023-09-28 RX ADMIN — MULTIPLE VITAMINS W/ MINERALS TAB 1 TABLET: TAB at 09:38

## 2023-09-28 NOTE — PAYOR COMM NOTE
"Agustin Stubbs (47 y.o. Male)                           ATTENTION; INITIAL CLINICALS PENDING CASE REF #[id number]                         REPLY TO [facility] DEPT  [phone] OR CALL   GRADY AMANDA LPN           Date of Birth   1975    Social Security Number       Address   [address] [place] [address]    Home Phone   [phone]    MRN   [id number]       Mandaen   None    Marital Status                               Admission Date   9/27/23    Admission Type   Urgent    Admitting Provider   Lillie Lindquist MD    Attending Provider   Lillie Lindquist MD    Department, Room/Bed   [facility] INTENSIVE CARE, I376/1       Discharge Date       Discharge Disposition       Discharge Destination                                 Attending Provider: Lillie Lindquist MD    Allergies: Penicillins    Isolation: None   Infection: None   Code Status: CPR    Ht: 180.3 cm (71\")   Wt: 96.1 kg (211 lb 13.8 oz)    Admission Cmt: None   Principal Problem: Status epilepticus [G40.901]                   Active Insurance as of 9/27/2023       Primary Coverage       Payor Plan Insurance Group Employer/Plan Group    MISC MULTI PLAN MISC MULTIPLAN - [id number]       Coverage Address Coverage Phone Number Coverage Fax Number Effective Dates    [address] [phone]  1/1/2023 - None Entered    [facility] [id number]         Subscriber Name Subscriber Birth Date Member ID       AGUSTIN STUBBS 1975 [id number]                     Emergency Contacts        (Rel.) Home Phone Work Phone Mobile Phone    MAGI HUGGINS (Significant Other) [phone] -- [phone]                 History & Physical        Gildardo Monroe Jr., MD at 09/27/23 1143                        Patient Care Team:  Indy Moffett APRN as PCP - General (Nurse Practitioner)  Meera Ballard APRN as Nurse Practitioner (Nurse Practitioner)      Subjective    Patient is a 47 y.o. male.  Asked admit " by the neurology service patient transferred in [facility] for evaluation of status epilepticus and apparently he has a history of traumatic brain injury and is a daily alcohol abuser also smokes and he was intubated is on a ventilator so unable to provide any history wife is at bedside he has hypertension and he takes his blood pressure medication he had had trauma to believe about 7 years ago but no seizures he did take a new nutritional supplement that was L-arginine and L Citrulline yesterday.  He does drink about 4-5 beers per night he had a few she does not know exactly how many last night.  He and she fell asleep watching a movie she was awakened early this morning by him seizing she said his arms and legs were both rigid down and shaking.  He has been trying to stop smoking he is down to about a pack a day used to be much heavier he is not known to have any COPD or breathing difficulties other than she says that she thinks he has sleep apnea, stopping breathing for short periods at night.  She is not aware of any medication allergies he has not had any real residuals from his traumatic brain injury other than he may be a little more labile emotionally than he used to be that he does not use any illicit drugs.  No known heart liver kidney disease no bleeding diatheses..  Wife does note that he had mentioned a little headache the last couple of days they thought it was sinus related.      Review of Systems:  Unable to obtain      History  Past Medical History:   Diagnosis Date    Chronic allergic rhinitis     High blood pressure     Hyperlipemia     Hypertension     No pertinent past medical history      Past Surgical History:   Procedure Laterality Date    COLONOSCOPY      COLONOSCOPY N/A 12/20/2021    Procedure: COLONOSCOPY with possible biopies.;  Surgeon: Aaron Perla MD;  Location: [facility] ENDOSCOPY;  Service: General;  Laterality: N/A;  COLON POLYPS     Social History     Socioeconomic History  "   Marital status:    Tobacco Use    Smoking status: Every Day     Packs/day: 0.25     Types: Cigarettes     Passive exposure: Current    Smokeless tobacco: Never    Tobacco comments:     CURRENT EVERY DAY SMOKER, SMOKED 21-30 YEARS   Vaping Use    Vaping Use: Never used   Substance and Sexual Activity    Alcohol use: Yes     Comment: 4 DRINKS PER DAY, HAS BEEN DRINKING FOR 21-30 YEARS    Drug use: Not Currently     Comment: \"Long time ago party drugs\"    Sexual activity: Defer     Family History   Problem Relation Age of Onset    Cancer Mother     Diabetes Mother     Other Father         RENAL CALCULUS    Diabetes Father     Cancer Brother     Breast cancer Other 30        AUNT    Lung cancer Other         UNCLE    Malig Hyperthermia Neg Hx          Allergies:  Penicillins    Medications:  Prior to Admission medications    Medication Sig Start Date End Date Taking? Authorizing Provider   fenofibrate 160 MG tablet TAKE ONE TABLET BY MOUTH DAILY 4/24/23   Indy Moffett APRN   fluticasone (FLONASE) 50 MCG/ACT nasal spray 2 sprays into the nostril(s) as directed by provider Daily. 5/11/23   Indy Moffett APRN   Lactobacillus (PROBIOTIC ACIDOPHILUS PO) Take  by mouth.    Provider, MD Roxanne   lisinopril-hydrochlorothiazide (PRINZIDE,ZESTORETIC) 10-12.5 MG per tablet TAKE ONE TABLET BY MOUTH DAILY 7/24/23   Indy Moffett APRN   multivitamin with minerals tablet tablet Take 1 tablet by mouth Daily.    Provider, MD Roxanne   nicotine (Nicoderm CQ) 21 MG/24HR patch Place 1 patch on the skin as directed by provider Daily. 8/29/23   Indy Moffett APRN   omeprazole (priLOSEC) 40 MG capsule Take 1 capsule by mouth Daily. 2/2/22   Indy Moffett APRN       No current facility-administered medications for this encounter.      Objective    Vital Signs  Vital Sign Min/Max for last 24 hours  Temp  Min: 97.7 °F (36.5 °C)  Max: 97.9 °F (36.6 °C)   BP  Min: 105/69  Max: 186/96   Pulse  Min: 99  Max: 149   Resp "  Min: 16  Max: 27   SpO2  Min: 92 %  Max: 97 %   Flow (L/min)  Min: 2  Max: 2   Weight  Min: 91.9 kg (202 lb 9.6 oz)  Max: 93.2 kg (205 lb 7.5 oz)     No intake or output data in the 24 hours ending 09/27/23 1144  No intake/output data recorded.  Last Weight and Admission Weight        09/27/23  1114   Weight: 93.2 kg (205 lb 7.5 oz)     Flowsheet Rows      Flowsheet Row First Filed Value   Admission Height --   Admission Weight 93.2 kg (205 lb 7.5 oz) Documented at 09/27/2023 1114            Body mass index is 28.66 kg/m².           Physical Exam:  General Appearance: Well-developed white male he is orally intubated with an 8 endotracheal tube at 26 cm at the lips.  He is on assist-control of 20 tidal volume 400 he is 70 inches tall per his wife, FiO2 is 50% PEEP is 5 cm SPO2 is 100 end-tidal CO2 is about 43 he is sedated with propofol at 50 mics and fentanyl at 100 mics per hour  Eyes: Conjunctiva are injected pupils are pinpoint bilaterally  ENT: I do not see any definite bite marks on his tongue mucous membranes are little dry  Neck: Trachea midline no crepitance no visible jugular venous tension no palpable masses  Lungs: There are symmetric nonlabored expansion  Cardiac: Regular rate rhythm no murmur  Abdomen: Soft nontender no palpable hepatosplenomegaly or masses  : Grossly normal  Musculoskeletal: Looks grossly normal  Skin: Warm and dry no jaundice no petechiae  Neuro: He is heavily sedated he is not following any commands but he is withdrawing all 4 extremities to fairly light noxious stimuli  Extremities/P Vascular: No clubbing no cyanosis no edema palpable radial and dorsalis pedis pulses  MSE: Unable to assess      Labs:  Results from last 7 days   Lab Units 09/27/23  0609 09/27/23  0444 09/27/23  0442   GLUCOSE mg/dL  --   --  252*   GLUCOSE, ARTERIAL mg/dL 272* 245*  --    SODIUM mmol/L  --   --  140   SODIUM, ARTERIAL mmol/L 141.4 143.5  --    POTASSIUM mmol/L  --   --  4.1   MAGNESIUM mg/dL  --    --  2.4   CO2 mmol/L  --   --  5.3*   CHLORIDE mmol/L  --   --  99   ANION GAP mmol/L  --   --  35.7*   CREATININE mg/dL  --   --  1.25   BUN mg/dL  --   --  12   BUN / CREAT RATIO   --   --  9.6   CALCIUM mg/dL  --   --  9.8   ALK PHOS U/L  --   --  117   TOTAL PROTEIN g/dL  --   --  7.4   ALT (SGPT) U/L  --   --  30   AST (SGOT) U/L  --   --  28   BILIRUBIN mg/dL  --   --  0.3   ALBUMIN g/dL  --   --  5.1   GLOBULIN gm/dL  --   --  2.3     Estimated Creatinine Clearance: 84.3 mL/min (by C-G formula based on SCr of 1.25 mg/dL).      Results from last 7 days   Lab Units 09/27/23  0442   WBC 10*3/mm3 16.21*   RBC 10*6/mm3 5.05   HEMOGLOBIN g/dL 16.9   HEMATOCRIT % 50.9   MCV fL 100.8*   MCH pg 33.5*   MCHC g/dL 33.2   RDW % 13.5   RDW-SD fl 50.4   MPV fL 9.1   PLATELETS 10*3/mm3 362   NEUTROPHIL % % 40.4*   LYMPHOCYTE % % 44.2   MONOCYTES % % 9.3   EOSINOPHIL % % 2.0   BASOPHIL % % 1.1   IMM GRAN % % 3.0*   NEUTROS ABS 10*3/mm3 6.56   LYMPHS ABS 10*3/mm3 7.16*   MONOS ABS 10*3/mm3 1.51*   EOS ABS 10*3/mm3 0.32   BASOS ABS 10*3/mm3 0.18   IMMATURE GRANS (ABS) 10*3/mm3 0.48*   NRBC /100 WBC 0.1     Results from last 7 days   Lab Units 09/27/23  0609   PH, ARTERIAL pH units 6.981*   PO2 ART mm Hg 118.2*   PCO2, ARTERIAL mm Hg 61.6*   HCO3 ART mmol/L 14.2*     Results from last 7 days   Lab Units 09/27/23  0842 09/27/23 0442   CK TOTAL U/L  --  188   HSTROP T ng/L 46* 18*     Results from last 7 days   Lab Units 09/27/23  0442   PROBNP pg/mL <36.0     Results from last 7 days   Lab Units 09/27/23  0442   TSH uIU/mL 1.760     Results from last 7 days   Lab Units 09/27/23  0842 09/27/23  0609 09/27/23  0444 09/27/23 0443   LACTATE mmol/L 1.4  --   --  25.1*   LACTATE, ARTERIAL mmol/L  --  9.22* 20.26*  --      Results from last 7 days   Lab Units 09/27/23  0442   INR  1.17*     Microbiology Results (last 10 days)       ** No results found for the last 240 hours. **              Diagnostics:  CT Head Without  Contrast    Result Date: 9/27/2023  PROCEDURE: CT HEAD WO CONTRAST  COMPARISON: 9/1/2019.  INDICATIONS: 47-year-old male with seizures.  PROTOCOL:   Standard CT imaging protocol performed.    RADIATION:   Total DLP: 1,145.2 mGy*cm   MA and/or KV were/was adjusted to minimize radiation dose.    TECHNIQUE: After obtaining the patient's consent, 146 CT images were obtained without non-ionic intravenous contrast material.  FINDINGS: Encephalomalacia involves the inferior frontal lobes, greater on the left than the right, as well as the anterior temporal lobes, probably greater on the left, as well.  These findings may be related to chronic posttraumatic changes, such as hemorrhagic contusions.  Please correlate clinically.  No acute intracranial hemorrhage is seen.  No acute infarct.  No midline shift or acute intracranial herniation syndrome is seen.  No acute skull fracture is identified.  Mild-to-moderate age-indeterminate mucosal thickening involves the imaged paranasal sinuses.  No air-fluid interfaces are seen within the imaged paranasal sinuses.  No significant acute findings are seen with regard to the imaged orbits or middle ear clefts.  There is nonspecific diffuse prominence of the nasopharyngeal mucosal space which may be related to lymphoid hyperplasia.       No acute intracranial hemorrhage is seen.  No acute infarct.  No significant interval change is appreciated since the 09/01 2019 CT study with bifrontal and bi-temporal, left greater than right, posttraumatic encephalomalacia suspected, as detailed above.     Please note that portions of this note were completed with a voice recognition program.  PAPI SHERWOOD JR, MD       Electronically Signed and Approved By: PAPI SHERWOOD JR, MD on 9/27/2023 at 5:51              XR Chest 1 View    Result Date: 9/27/2023  PROCEDURE: XR CHEST 1 VW  COMPARISON: 2/6/2023.  INDICATIONS: ET TUBE PLACEMENT; IMAGING FOLLOW-UP.  FINDINGS:  A single AP supine portable chest  radiograph is provided for review.  There has been interval endotracheal (ET) intubation.  The distal tip of the endotracheal (ET) tube is projected about 2.9 cm above the bandar.  It is in satisfactory position.  There are low lung volumes.  No acute infiltrate is seen.  Degenerative changes involve the imaged spine and the bilateral shoulders.  No pneumothorax is seen.  No pleural effusion.  There may be thoracic aortic atherosclerotic change.        The endotracheal (ET) tube is in satisfactory position.  No acute infiltrate is seen.  Probably no cardiac enlargement.      Please note that portions of this note were completed with a voice recognition program.  PAPI SHERWOOD JR, MD       Electronically Signed and Approved By: PAPI SHERWOOD JR, MD on 9/27/2023 at 5:42              XR Abdomen KUB    Result Date: 9/27/2023  PROCEDURE: XR ABDOMEN KUB  COMPARISON: [facility], , XR ABDOMEN KUB, 9/27/2023, 7:48.  INDICATIONS: Position of OG tube after repositioning per previous Xray recommendations  FINDINGS:  Nasogastric tube tip has been advanced with the tip lying in the area of the stomach.  Extraneous objects project over the abdomen obscuring the detail to some degree.  Bowel gas pattern is nonspecific.        1. Enteric tube has been advanced with the tip in the area of the stomach.     GLADYS NORWOOD MD       Electronically Signed and Approved By: GLADYS NROWOOD MD on 9/27/2023 at 9:31             XR Abdomen KUB    Result Date: 9/27/2023  PROCEDURE: XR ABDOMEN KUB  COMPARISON: None  INDICATIONS: OG placement verification  FINDINGS:  The NG tube appears looped in the distal thoracic esophagus near the gastroesophageal junction.  The visualized bowel gas pattern is normal.        1. NG tube, as above.  Recommend retracting the tube approximately 5 cm and then advancing the tube 8 cm for better positioning.     Jamil Nunez M.D.       Electronically Signed and Approved By: Jamil Nunez M.D. on  9/27/2023 at 8:13               EKG reviewed and sinus tachycardia right bundle branch block there is a little bit of lateral ST elevation possible  Did independently review chest x-ray and CT head from [facility]    Status epilepticus neuro service to consult and provide treatment guidelines.  He is on propofol we will continue some Keppra pending neurology's recommendations.  Respiratory failure he had a severe combined metabolic and respiratory acidosis earlier.  He is getting really small tidal volumes about 5 mL/kg have increased him to 7 and will get follow-up blood gas.  Lactic acidosis probably secondary to seizure corrected quickly  Elevated troponin that did rise I am going to get a follow-up EKG and repeat his troponin  Alcohol use abuse he still had a mild level of alcohol present consistent with the history wife gives we will have to certainly watch for alcohol withdrawal put him on the alcohol withdrawal protocol and administer thiamine and folate  Tobacco abuse we will put on a nicotine patch she needs to stop smoking      Gildardo Monroe Jr, MD  09/27/23  11:44 EDT    Time: Critical care time 49 minutes    Electronically signed by Gildardo Monroe Jr., MD at 09/27/23 1216           Vital Signs (last day)       Date/Time Temp Temp src Pulse Resp BP Patient Position SpO2    09/28/23 1238 98.7 (37.1) -- -- -- -- -- --    09/28/23 1200 -- -- 61 -- 146/92 -- 94    09/28/23 1100 -- -- 61 -- 142/92 -- 93    09/28/23 1055 -- -- 60 20 -- -- 97    09/28/23 1000 -- -- 61 -- 154/100 -- 97    09/28/23 0900 -- -- 60 -- 153/102 -- 97    09/28/23 0800 -- -- 61 -- 153/102 -- 97    09/28/23 0730 97.7 (36.5) -- -- -- -- -- --    09/28/23 0703 -- -- 61 20 -- -- 97    09/28/23 0700 -- -- 61 -- 153/103 -- 97    09/28/23 0630 -- -- 61 -- 152/104 -- 97    09/28/23 0600 -- -- 60 -- 152/102 -- 97 09/28/23 0530 -- -- 60 -- 149/100 -- 97 09/28/23 0500 -- -- 61 -- 147/99 -- 97 09/28/23 0446 -- -- 61  20 -- -- 97    09/28/23 0430 -- -- 60 -- 141/96 -- 97    09/28/23 0400 -- -- 61 -- 138/94 -- 97    09/28/23 0300 -- -- 61 -- 137/95 -- 97    09/28/23 0200 97.5 (36.4) Axillary 63 -- 125/87 -- 97    09/28/23 0100 -- -- 63 -- 112/77 -- 97    09/28/23 0000 -- -- 63 -- 115/79 -- 97    09/27/23 2300 97.8 (36.6) Axillary 65 -- 116/82 -- 97    09/27/23 2259 -- -- 65 20 -- -- 97    09/27/23 2200 -- -- 66 -- 119/85 -- 97    09/27/23 1929 -- -- 67 20 -- -- 97    09/27/23 1900 97.8 (36.6) Axillary 69 -- 126/90 -- 97    09/27/23 1800 -- -- 73 -- 125/90 -- 97    09/27/23 1700 -- -- 72 -- 118/82 -- 97    09/27/23 1600 -- -- 79 -- 131/90 -- 99    09/27/23 1550 98.7 (37.1) Oral -- -- -- -- --    09/27/23 1500 -- -- 72 -- 111/76 -- 96    09/27/23 1400 -- -- 80 -- 119/79 -- 96    09/27/23 1300 -- -- 81 -- 134/91 -- 98    09/27/23 1200 -- -- 87 -- 127/85 -- 98    09/27/23 1114 97.9 (36.6) -- -- -- -- -- --          Oxygen Therapy (last day)       Date/Time SpO2 Device (Oxygen Therapy) Flow (L/min) Oxygen Concentration (%) ETCO2 (mmHg)    09/28/23 1200 94 -- -- -- --    09/28/23 1100 93 -- -- -- --    09/28/23 1055 97 ventilator -- 21 --    09/28/23 1000 97 -- -- -- --    09/28/23 0900 97 -- -- -- --    09/28/23 0800 97 ventilator -- 30 --    09/28/23 0703 97 ventilator -- 30 --    09/28/23 0700 97 -- -- -- --    09/28/23 0630 97 -- -- -- --    09/28/23 0600 97 -- -- -- --    09/28/23 0530 97 -- -- -- --    09/28/23 0500 97 -- -- -- --    09/28/23 0446 97 ventilator -- 30 --    09/28/23 0430 97 -- -- -- --    09/28/23 0400 97 ventilator -- 30 --    09/28/23 0300 97 -- -- -- --    09/28/23 0200 97 -- -- -- --    09/28/23 0100 97 -- -- -- --    09/28/23 0000 97 -- -- -- --    09/27/23 2300 97 -- -- -- --    09/27/23 2259 97 ventilator -- 30 --    09/27/23 2200 97 -- -- -- --    09/27/23 2000 -- ventilator -- 50 --    09/27/23 1929 97 ventilator -- 50 --    09/27/23 1900 97 -- -- -- --    09/27/23 1800 97 -- -- -- --    09/27/23 1700 97  -- -- -- --    09/27/23 1600 99 -- -- -- --    09/27/23 1500 96 -- -- -- --    09/27/23 1400 96 -- -- -- --    09/27/23 1300 98 -- -- -- --    09/27/23 1200 98 -- -- -- --    09/27/23 1130 -- ventilator -- 45 --          Lines, Drains & Airways       Active LDAs       Name Placement date Placement time Site Days    Peripheral IV 09/27/23 0454 Left Antecubital 09/27/23  0454  Antecubital  1    Peripheral IV 09/27/23 0535 Posterior;Right Hand 09/27/23  0535  Hand  1    Peripheral IV 09/27/23 0727 Right Antecubital 09/27/23  0727  Antecubital  1    NG/OG Tube Orogastric 18 Fr Center mouth 09/27/23  0757  Center mouth  1    Urethral Catheter Temperature probe 18 Fr. 09/27/23  0715  -- 1    ETT  09/27/23 0457  -- 1              Inactive LDAs       None                  Facility-Administered Medications as of 9/28/2023   Medication Dose Route Frequency Provider Last Rate Last Admin    acetaminophen (TYLENOL) tablet 650 mg  650 mg Oral Q4H PRN Gildardo Monroe Jr., MD        Or    acetaminophen (TYLENOL) suppository 650 mg  650 mg Rectal Q4H PRN Gildardo Monroe Jr., MD        sennosides-docusate (PERICOLACE) 8.6-50 MG per tablet 2 tablet  2 tablet Oral BID Gildardo Monroe Jr., MD   2 tablet at 09/27/23 2117    And    polyethylene glycol (MIRALAX) packet 17 g  17 g Oral Daily PRN Gildardo Monroe Jr., MD        And    bisacodyl (DULCOLAX) EC tablet 5 mg  5 mg Oral Daily PRN Gildardo Monroe Jr., MD        And    bisacodyl (DULCOLAX) suppository 10 mg  10 mg Rectal Daily PRN Gildardo Monroe Jr., MD        Calcium Replacement - Follow Nurse / BPA Driven Protocol   Does not apply PRGildardo Jade Jr., MD        chlorhexidine (PERIDEX) 0.12 % solution 15 mL  15 mL Mouth/Throat Q12H Gildardo Monroe Jr., MD   15 mL at 09/28/23 0939    dexmedetomidine (PRECEDEX) 400 mcg in 100 mL NS infusion  0.2-1.5 mcg/kg/hr Intravenous Titrated Gildardo Monroe Jr., MD 11.7 mL/hr at 09/28/23 0950 0.5  mcg/kg/hr at 23 0950    Enoxaparin Sodium (LOVENOX) syringe 40 mg  40 mg Subcutaneous Q24H Gildardo Monroe Jr., MD   40 mg at 23 1538    [COMPLETED] etomidate (AMIDATE) injection 20 mg  20 mg Intravenous Once Tobin Levy MD   20 mg at 23 0454    famotidine (PEPCID) injection 20 mg  20 mg Intravenous BID Gildardo Monroe Jr., MD   20 mg at 23 0938    fentaNYL (SUBLIMAZE) bolus from bag 10 mcg/mL 50 mcg  50 mcg Intravenous Q30 Min PRN Gildardo Monroe Jr., MD        fentaNYL 1000 mcg in 100 mL NS infusion   mcg/hr Intravenous Titrated Gildardo Monroe Jr., MD 7.5 mL/hr at 23 0105 75 mcg/hr at 23 0105    fentaNYL citrate (PF) (SUBLIMAZE) injection 50 mcg  50 mcg Intravenous Q30 Min PRN Gildardo Monroe Jr., MD        folic acid 1 mg in sodium chloride 0.9 % 50 mL IVPB  1 mg Intravenous Daily Gildardo Monroe Jr.,  mL/hr at 23 0938 1 mg at 23 0938    labetalol (NORMODYNE,TRANDATE) injection 20 mg  20 mg Intravenous Q10 Min PRN Gildardo Monroe Jr., MD        levETIRAcetam (KEPPRA) injection 1,000 mg  1,000 mg Intravenous Q12H Gildardo Monroe Jr., MD   1,000 mg at 23 0938    [COMPLETED] levETIRAcetam in NaCl 0.75% (KEPPRA) IVPB 1,000 mg  1,000 mg Intravenous Once Tobin Levy MD   Stopped at 23 0604    [] LORazepam (ATIVAN) injection 2 mg  2 mg Intravenous Q6H Gildardo Monroe Jr., MD   2 mg at 23 0614    Followed by    LORazepam (ATIVAN) injection 1 mg  1 mg Intravenous Q6H Gildardo Monroe Jr., MD        LORazepam (ATIVAN) tablet 1 mg  1 mg Oral Q1H PRN Gildardo Monroe Jr., MD        Or    LORazepam (ATIVAN) injection 1 mg  1 mg Intravenous Q1H PRN Gildardo Monroe Jr., MD        Or    LORazepam (ATIVAN) tablet 2 mg  2 mg Oral Q1H PRN Gildardo Monreo Jr., MD        Or    LORazepam (ATIVAN) injection 2 mg  2 mg Intravenous Q1H PRN Gildardo Monroe Jr., MD        Or     LORazepam (ATIVAN) injection 2 mg  2 mg Intravenous Q15 Min PRN Gildardo Monroe Jr., MD        Or    LORazepam (ATIVAN) injection 2 mg  2 mg Intramuscular Q15 Min PRN Gildardo Monroe Jr., MD        Or    LORazepam (ATIVAN) tablet 4 mg  4 mg Oral Q1H PRN Gildardo Monroe Jr., MD        Or    LORazepam (ATIVAN) injection 4 mg  4 mg Intravenous Q1H PRN Gildardo Monroe Jr., MD        [COMPLETED] LORazepam (ATIVAN) injection 2 mg  2 mg Intravenous Once Tobin Levy MD   2 mg at 09/27/23 0450    Magnesium Standard Dose Replacement - Follow Nurse / BPA Driven Protocol   Does not apply PRN Gildardo Monroe Jr., MD        [COMPLETED] Midazolam HCl (PF) (VERSED) injection 4 mg  4 mg Intravenous Once Tobin Levy MD   4 mg at 09/27/23 0504    multivitamin with minerals 1 tablet  1 tablet Oral Daily Gildardo Monroe Jr., MD   1 tablet at 09/28/23 0938    nicotine (NICODERM CQ) 21 MG/24HR patch 1 patch  1 patch Transdermal Q24H Gildardo Monroe Jr., MD   1 patch at 09/27/23 1534    nitroglycerin (NITROSTAT) SL tablet 0.4 mg  0.4 mg Sublingual Q5 Min PRN Gildardo Monroe Jr., MD        ondansetron (ZOFRAN) injection 4 mg  4 mg Intravenous Q6H PRN Gildardo Monroe Jr., MD        Phosphorus Replacement - Follow Nurse / BPA Driven Protocol   Does not apply PRN Gildardo Monroe Jr., MD        Potassium Replacement - Follow Nurse / BPA Driven Protocol   Does not apply PRN Gildardo Monroe Jr., MD        propofol (DIPRIVAN) infusion 10 mg/mL 100 mL  5-50 mcg/kg/min Intravenous Titrated Gildardo Monroe Jr., MD 5.59 mL/hr at 09/28/23 0937 10 mcg/kg/min at 09/28/23 0937    [COMPLETED] rocuronium (ZEMURON) injection 90,000 mcg  1,000 mcg/kg Intravenous Once Tobin Levy MD   90,000 mcg at 09/27/23 0456    [COMPLETED] sodium chloride 0.9 % bolus 2,000 mL  2,000 mL Intravenous Once Tobin Levy MD   Stopped at 09/27/23 0605    sodium chloride 0.9 % flush 10 mL  10 mL  Intravenous Q12H Gildardo Monroe Jr., MD   10 mL at 09/28/23 0939    sodium chloride 0.9 % flush 10 mL  10 mL Intravenous PRN Gildardo Monroe Jr., MD        sodium chloride 0.9 % infusion 40 mL  40 mL Intravenous PRN Gildardo Monroe Jr., MD        sodium chloride 0.9 % infusion  100 mL/hr Intravenous Continuous Gildardo Monroe Jr.,  mL/hr at 09/28/23 0134 100 mL/hr at 09/28/23 0134    thiamine (B-1) injection 200 mg  200 mg Intravenous Q8H Gildardo Monroe Jr., MD   200 mg at 09/28/23 0614    Followed by    [START ON 10/2/2023] thiamine (VITAMIN B-1) tablet 100 mg  100 mg Oral Daily Gildardo Monroe Jr., MD         Orders (last 24 hrs)        Start     Ordered    10/02/23 1400  thiamine (VITAMIN B-1) tablet 100 mg  Daily        See Hyperspace for full Linked Orders Report.    09/27/23 1213    09/29/23 0600  RN to Place Order for PT Eval & Treat if Patient Intubated Over 48 Hours  Continuous         09/27/23 1212    09/29/23 0600  CBC (No Diff)  Morning Draw         09/28/23 0822    09/28/23 1300  LORazepam (ATIVAN) injection 1 mg  Every 6 Hours        See Hyperspace for full Linked Orders Report.    09/27/23 1213    09/28/23 1242  POC Glucose Once  PROCEDURE ONCE        Comments: Complete no more than 45 minutes prior to patient eating      09/28/23 1241    09/28/23 1138  MRI Brain With & Without Contrast  1 Time Imaging         09/28/23 1137    09/28/23 0821  Comprehensive Metabolic Panel  Once         09/28/23 0822    09/28/23 0742  EEG Continuous Monitoring With Video  Once         09/28/23 0741    09/28/23 0514  POC Glucose Once  PROCEDURE ONCE        Comments: Complete no more than 45 minutes prior to patient eating      09/28/23 0513    09/27/23 2320  POC Glucose Once  PROCEDURE ONCE        Comments: Complete no more than 45 minutes prior to patient eating      09/27/23 2317    09/27/23 1915  Restraints Non-Violent or Non-Self Destructive  Calendar Day         09/27/23 2006     09/27/23 1803  POC Glucose Once  PROCEDURE ONCE        Comments: Complete no more than 45 minutes prior to patient eating      09/27/23 1758    09/27/23 1628  CBC (No Diff)  STAT         09/27/23 1212    09/27/23 1621  Comprehensive Metabolic Panel  STAT         09/27/23 1212    09/27/23 1621  High Sensitivity Troponin T  STAT         09/27/23 1212    09/27/23 1600  Oral Care & Teeth Brushing - Intubated Patient  Every 4 Hours      Comments: [place] [facility] at Least 2x/day    09/27/23 1212    09/27/23 1537  Calcium, Ionized  STAT         09/27/23 1212    09/27/23 1516  EEG Continuous Monitoring With Video  Once        Comments: Seizure characterization    09/27/23 1515    09/27/23 1515  EEG Awake or Drowsy Routine  Once,   Status:  Canceled         09/27/23 1515    09/27/23 1400  thiamine (B-1) injection 200 mg  Every 8 Hours Scheduled        See Hyperspace for full Linked Orders Report.    09/27/23 1213    09/27/23 1400  folic acid 1 mg in sodium chloride 0.9 % 50 mL IVPB  Daily         09/27/23 1213    09/27/23 1400  multivitamin with minerals 1 tablet  Daily         09/27/23 1213    09/27/23 1400  nicotine (NICODERM CQ) 21 MG/24HR patch 1 patch  Every 24 Hours         09/27/23 1213    09/27/23 1330  Enoxaparin Sodium (LOVENOX) syringe 40 mg  Every 24 Hours         09/27/23 1239    09/27/23 1328  Blood Gas, Arterial -  PROCEDURE ONCE         09/27/23 1323    09/27/23 1300  Vital Signs Every Hour and Per Hospital Policy Based on Patient Condition  Every Hour       09/27/23 1212    09/27/23 1300  Intake & Output  Every Hour       09/27/23 1212    09/27/23 1300  sodium chloride 0.9 % flush 10 mL  Every 12 Hours Scheduled         09/27/23 1212    09/27/23 1300  sennosides-docusate (PERICOLACE) 8.6-50 MG per tablet 2 tablet  2 Times Daily        See Hyperspace for full Linked Orders Report.    09/27/23 1212    09/27/23 1300  chlorhexidine (PERIDEX) 0.12 % solution 15 mL  Every 12 Hours Scheduled         09/27/23 6208     09/27/23 1300  famotidine (PEPCID) injection 20 mg  2 Times Daily         09/27/23 1212    09/27/23 1300  fentaNYL 1000 mcg in 100 mL NS infusion  Titrated         09/27/23 1212    09/27/23 1300  dexmedetomidine (PRECEDEX) 400 mcg in 100 mL NS infusion  Titrated         09/27/23 1212    09/27/23 1300  propofol (DIPRIVAN) infusion 10 mg/mL 100 mL  Titrated         09/27/23 1212    09/27/23 1300  sodium chloride 0.9 % infusion  Continuous         09/27/23 1212    09/27/23 1300  levETIRAcetam (KEPPRA) injection 1,000 mg  Every 12 Hours Scheduled         09/27/23 1212    09/27/23 1300  LORazepam (ATIVAN) injection 2 mg  Every 6 Hours        See Hyperspace for full Linked Orders Report.    09/27/23 1213    09/27/23 1213  LORazepam (ATIVAN) tablet 1 mg  Every 1 Hour PRN        See Hyperspace for full Linked Orders Report.    09/27/23 1213    09/27/23 1213  LORazepam (ATIVAN) injection 1 mg  Every 1 Hour PRN        See Hyperspace for full Linked Orders Report.    09/27/23 1213    09/27/23 1213  LORazepam (ATIVAN) tablet 2 mg  Every 1 Hour PRN        See Hyperspace for full Linked Orders Report.    09/27/23 1213    09/27/23 1213  LORazepam (ATIVAN) injection 2 mg  Every 1 Hour PRN        See Hyperspace for full Linked Orders Report.    09/27/23 1213    09/27/23 1213  LORazepam (ATIVAN) injection 2 mg  Every 15 Minutes PRN        See Hyperspace for full Linked Orders Report.    09/27/23 1213    09/27/23 1213  LORazepam (ATIVAN) injection 2 mg  Every 15 Minutes PRN        See Hyperspace for full Linked Orders Report.    09/27/23 1213    09/27/23 1213  LORazepam (ATIVAN) tablet 4 mg  Every 1 Hour PRN        See Hyperspace for full Linked Orders Report.    09/27/23 1213    09/27/23 1213  LORazepam (ATIVAN) injection 4 mg  Every 1 Hour PRN        See Hyperspace for full Linked Orders Report.    09/27/23 1213    09/27/23 1213  Daily Weights  Daily       09/27/23 1212    09/27/23 1212  ondansetron (ZOFRAN) injection 4 mg  Every 6  Hours PRN         09/27/23 1212    09/27/23 1211  labetalol (NORMODYNE,TRANDATE) injection 20 mg  Every 10 Minutes PRN         09/27/23 1212    09/27/23 1211  acetaminophen (TYLENOL) tablet 650 mg  Every 4 Hours PRN        See Hyperspace for full Linked Orders Report.    09/27/23 1212    09/27/23 1211  acetaminophen (TYLENOL) suppository 650 mg  Every 4 Hours PRN        See Hyperspace for full Linked Orders Report.    09/27/23 1212    09/27/23 1211  Potassium Replacement - Follow Nurse / BPA Driven Protocol  As Needed         09/27/23 1212    09/27/23 1211  Magnesium Standard Dose Replacement - Follow Nurse / BPA Driven Protocol  As Needed         09/27/23 1212    09/27/23 1211  Phosphorus Replacement - Follow Nurse / BPA Driven Protocol  As Needed         09/27/23 1212    09/27/23 1211  Calcium Replacement - Follow Nurse / BPA Driven Protocol  As Needed         09/27/23 1212    09/27/23 1209  fentaNYL (SUBLIMAZE) bolus from bag 10 mcg/mL 50 mcg  Every 30 Minutes PRN         09/27/23 1212    09/27/23 1209  fentaNYL citrate (PF) (SUBLIMAZE) injection 50 mcg  Every 30 Minutes PRN         09/27/23 1212    09/27/23 1208  Oral Care - Patient Not on NPPV & Not Intubated  Every Shift       09/27/23 1212    09/27/23 1207  sodium chloride 0.9 % flush 10 mL  As Needed         09/27/23 1212    09/27/23 1207  sodium chloride 0.9 % infusion 40 mL  As Needed         09/27/23 1212    09/27/23 1207  polyethylene glycol (MIRALAX) packet 17 g  Daily PRN        See Hyperspace for full Linked Orders Report.    09/27/23 1212    09/27/23 1207  bisacodyl (DULCOLAX) EC tablet 5 mg  Daily PRN        See Hyperspace for full Linked Orders Report.    09/27/23 1212    09/27/23 1207  bisacodyl (DULCOLAX) suppository 10 mg  Daily PRN        See Hyperspace for full Linked Orders Report.    09/27/23 1212    09/27/23 1207  nitroglycerin (NITROSTAT) SL tablet 0.4 mg  Every 5 Minutes PRN         09/27/23 1212    Unscheduled  Spontaneous Awakening  Trial  Daily - SAT       23 1212    Unscheduled  Spontaneous Breathing Trial  Daily - SBT       23 1212    Unscheduled  Obtain Pre & Post Sedation Scores With Every Lorazepam Dose - Hold For POSS Greater Than 2 or RASS of -3 or Less  As Needed       23 1213                     Physician Progress Notes         Paul Rosas MD at 23 1126          DOS: 2023  NAME: Agustin Stubbs   : 1975  PCP: Indy Moffett APRN  No chief complaint on file.      Chief complaint: Seizure disorder   Subjective: Patient seen and examined the bedside this morning,.  Patient was intubated and on sedation.  Sedation held for 15 minutes.  Patient started to wake up and follow commands, squeezing on hands bilaterally and wiggling his fingers.  Patient received 2 mg of IV Ativan this morning so that would interfere with his neurological exam plus all the sedations.    Objective:  Vital signs: /100   Pulse 60   Temp 97.7 °F (36.5 °C)   Resp 20   Wt 96.1 kg (211 lb 13.8 oz)   SpO2 97%   BMI 29.55 kg/m²    Exam was limited by sedation, patient received 2 mg of Ativan this morning.  Gen: NAD, vitals reviewed  MS: After stopping sedation the patient started to wake and follow commands.  CN: VOR present, blink to threat bilaterally, pupils reactive to the light bilaterally, gag and cough present.  Motor: Moves upper extremities to command, wiggles toes bilaterally to commands, normal tone  Sensory: Withdrew to painful stimuli throughout.    Laboratory results:  Lab Results   Component Value Date    GLUCOSE 124 (H) 2023    CALCIUM 8.4 (L) 2023     2023    K 3.8 2023    CO2 21.8 (L) 2023     (H) 2023    BUN 12 2023    CREATININE 0.95 2023    BCR 12.6 2023    ANIONGAP 9.2 2023     Lab Results   Component Value Date    WBC 11.33 (H) 2023    HGB 14.8 2023    HCT 43.6 2023    MCV 98.0 (H) 2023    PLT  226 09/27/2023     Lab Results   Component Value Date     (H) 02/09/2023     (H) 04/06/2022    LDL 96 03/18/2021            Review of labs: Sodium 139, potassium 3.8, BUN 12, WBC 11.3.    Review and interpretation of imaging: No new brain image to review    Continuous EEG monitoring for over 8 hours showed diffuse slowing but no seizure activity or epileptiform discharges.    Diagnoses:  New onset seizure-like activity epilepsy versus alcohol withdrawal    Comment: Please start weaning sedation and extubate as tolerated.    Plan:  1.  Continue Keppra 1 g twice daily  2.  Close EEG monitoring  3.  Avoid Ativan unless tonic-clonic seizure lasting more than 3 minutes  4.  Get MRI brain with and without contrast when the patient can tolerate  5.  Seizure precaution discussed with the family, do not drive for 90 days from the last seizure do not operate heavy machine, do not swim unsupervised, do not approach open fire or get on roofs or ladders, avoid any activity that would put him in danger in case he would have a seizure.  Wife stated understanding.    Management discussed with ICU attending, nursing staff, family Dr. Monroe      Electronically signed by Paul Rosas MD at 09/28/23 1144          Consult Notes (last 24 hours)        Paul Rosas MD at 09/27/23 1543        Consult Orders    1. Inpatient Neurology Consult General [[id number]] ordered by Gildardo Monroe Jr., MD at 09/27/23 1212                 Neurology Consult Note    Consult Date: 9/27/2023    Referring MD: Tobin Levy MD    Reason for Consult I have been asked to see the patient in neurological consultation to render advice and opinion regarding multiple seizure-like activity concerning for status epilepticus.    Agustin Stubbs is a 47 y.o. white male with known diagnosis of hypertension, hyperlipidemia, prior intracerebral hemorrhage with unknown residual deficit, alcohol abuse, tobacco abuse presented to  [facility] as a transfer from [facility] for continuous EEG monitoring with concern about status epilepticus.  Patient currently intubated and sedated with propofol and fentanyl.  History was obtained from his wife and 2 daughters.  Wife stated that earlier this morning the patient had a generalized tonic-clonic seizure out of sleep, she was not sure if his head was turned to one side, not sure if the eyes were open or closed, generalized tonic-clonic seizure lasted for about 90 minutes but the patient did not regain consciousness, by the time EMS arrived patient had another seizure with a total of 5 seizures.  He was taken to the outside hospital had a CT head which showed no acute abnormality but did show left frontal encephalomalacia likely from his prior ICH.  Patient neuro exam was very limited due to sedation and intubation, as per wife and nursing staff when the patient arrived he was agitated and moving all extremities but did not follow commands.  As per the wife the patient is a heavy drinker drinks about 5 beers a day for 20+ years, his last drink was last night.    Past Medical History:   Diagnosis Date    Chronic allergic rhinitis     High blood pressure     Hyperlipemia     Hypertension     No pertinent past medical history        Exam  Temp 97.9 °F (36.6 °C)   Wt 93.2 kg (205 lb 7.5 oz)   BMI 28.66 kg/m²   Neuro exam limited by sedation and intubation  Gen: Sedated and intubated, vitals reviewed  MS: Did not respond to voice or sternal rub, did not follow any commands.    CN: Pupils 1 mm with sluggish reaction, VOR present, does not blink to threat bilaterally, does not breathe over the ventilator.  Motor: No withdrawal to painful stimuli throughout, normal tone    DATA:    Lab Results   Component Value Date    GLUCOSE 272 (H) 09/27/2023    CALCIUM 9.8 09/27/2023    .4 09/27/2023    K 4.1 09/27/2023    CO2 5.3 (L) 09/27/2023    CL 99 09/27/2023    BUN 12 09/27/2023    CREATININE  1.25 09/27/2023    BCR 9.6 09/27/2023    ANIONGAP 35.7 (H) 09/27/2023     Lab Results   Component Value Date    WBC 16.21 (H) 09/27/2023    HGB 16.9 09/27/2023    HCT 50.9 09/27/2023    .8 (H) 09/27/2023     09/27/2023       Lab review: CBC with WBC 16.2, glucose 272, sodium 141, BUN 12, creatinine 1.2    Imaging review: Personally reviewed the CT scan of the head from the outside hospital which showed no acute abnormality, there was encephalomalacia of the left frontal.    Diagnoses:  New onset seizure like activity concerning for status epilepticus from epilepsy versus alcohol withdrawal although this is less likely as the patient did drink alcohol yesterday.        PLAN:   1.  Patient currently on propofol and fentanyl which can help with his seizures.  2.  Continue Keppra 1 g twice daily  3.  Continuous EEG while connected to cEEG we can start weaning of sedation and adjust his antiseizure medications as needed depending on the read.  4.  We will discuss seizure precautions with the patient and family later during this admission    We are having upgrade to the EEG system today at 4 PM, the technician will do 21 minutes now then hooked to continuous EEG after 9 PM.  I would keep him on sedation until the continuous EEG gets going after 9 PM then start weaning of sedation.    Discussed with ICU attending Dr. Monroe    Medical Decision Making for this neurology consultation consists of the following:  Review of previous chart, including H/P, provider and nursing notes as applicable.  Review of medications and vital signs.  Review of previous labs, neuroimaging, and additional relevant diagnostics.   Interpretation of laboratory, imaging, and other diagnostic results  Discussion with other providers and family   Total face-to-face/floor time: 60 minutes.           Electronically signed by Paul Rosas MD at 09/27/23 [id number]

## 2023-09-28 NOTE — PROGRESS NOTES
DOS: 2023  NAME: Agustin Stubbs   : 1975  PCP: Indy Moffett APRN  No chief complaint on file.      Chief complaint: Seizure disorder   Subjective: Patient seen and examined the bedside this morning,.  Patient was intubated and on sedation.  Sedation held for 15 minutes.  Patient started to wake up and follow commands, squeezing on hands bilaterally and wiggling his fingers.  Patient received 2 mg of IV Ativan this morning so that would interfere with his neurological exam plus all the sedations.    Objective:  Vital signs: /100   Pulse 60   Temp 97.7 °F (36.5 °C)   Resp 20   Wt 96.1 kg (211 lb 13.8 oz)   SpO2 97%   BMI 29.55 kg/m²    Exam was limited by sedation, patient received 2 mg of Ativan this morning.  Gen: NAD, vitals reviewed  MS: After stopping sedation the patient started to wake and follow commands.  CN: VOR present, blink to threat bilaterally, pupils reactive to the light bilaterally, gag and cough present.  Motor: Moves upper extremities to command, wiggles toes bilaterally to commands, normal tone  Sensory: Withdrew to painful stimuli throughout.    Laboratory results:  Lab Results   Component Value Date    GLUCOSE 124 (H) 2023    CALCIUM 8.4 (L) 2023     2023    K 3.8 2023    CO2 21.8 (L) 2023     (H) 2023    BUN 12 2023    CREATININE 0.95 2023    BCR 12.6 2023    ANIONGAP 9.2 2023     Lab Results   Component Value Date    WBC 11.33 (H) 2023    HGB 14.8 2023    HCT 43.6 2023    MCV 98.0 (H) 2023     2023     Lab Results   Component Value Date     (H) 2023     (H) 2022    LDL 96 2021            Review of labs: Sodium 139, potassium 3.8, BUN 12, WBC 11.3.    Review and interpretation of imaging: No new brain image to review    Continuous EEG monitoring for over 8 hours showed diffuse slowing but no seizure activity or epileptiform  discharges.    Diagnoses:  New onset seizure-like activity epilepsy versus alcohol withdrawal    Comment: Please start weaning sedation and extubate as tolerated.    Plan:  1.  Continue Keppra 1 g twice daily  2.  Close EEG monitoring  3.  Avoid Ativan unless tonic-clonic seizure lasting more than 3 minutes  4.  Get MRI brain with and without contrast when the patient can tolerate  5.  Seizure precaution discussed with the family, do not drive for 90 days from the last seizure do not operate heavy machine, do not swim unsupervised, do not approach open fire or get on roofs or ladders, avoid any activity that would put him in danger in case he would have a seizure.  Wife stated understanding.    Management discussed with ICU attending, nursing staff, family Dr. Monroe

## 2023-09-28 NOTE — PROGRESS NOTES
LOS: 1 day   Patient Care Team:  Indy Moffett APRN as PCP - General (Nurse Practitioner)  Meera Ballard APRN as Nurse Practitioner (Nurse Practitioner)    Subjective     We were able to wake patient up over the course the morning and I just extubated him he is awake other than little sore throat he does not have any real complaints not short of breath not having chest pain not having any difficulties moving    Review of Systems:          Objective     Vital Signs  Vital Sign Min/Max for last 24 hours  Temp  Min: 97.5 °F (36.4 °C)  Max: 98.7 °F (37.1 °C)   BP  Min: 111/76  Max: 154/100   Pulse  Min: 60  Max: 79   Resp  Min: 20  Max: 20   SpO2  Min: 93 %  Max: 99 %   No data recorded   Weight  Min: 96.1 kg (211 lb 13.8 oz)  Max: 96.1 kg (211 lb 13.8 oz)        Ventilator/Non-Invasive Ventilation Settings (From admission, onward)       Start     Ordered    23 1210  Ventilator - Vent Mode: AC/VC; Rate: 20; FiO2: Titrate Per SpO2; Titrate Oxygen for SpO2: 90 - 95%; PEEP: 5; Tidal Volume: mL/kg PBW; mL/k  (Ventilator Settings & Bundle)  Continuous,   Status:  Canceled        Question Answer Comment   Vent Mode AC/VC    Rate 20    FiO2 Titrate Per SpO2    Titrate Oxygen for SpO2 90 - 95%    PEEP 5    Tidal Volume mL/kg PBW    mL/kg 7        23 1212                                 Body mass index is 29.55 kg/m².  I/O last 3 completed shifts:  In: 1604 [I.V.:1604]  Out: 450 [Urine:450]  No intake/output data recorded.        Physical Exam:  General Appearance: Well-developed white male he is resting comfortably in bed now he does not appear in acute distress  Eyes: Paul are clear and anicteric pupils are about 3 mm equal and reactive to light and extraocular muscles are intact  ENT: His membranes are moist no erythema no exudates Mallampati type II airway  Neck: Trachea midline no palpable adenopathy no visible jugular venous distention  Lungs: Clear nonlabored symmetric expansion  Cardiac:  Regular rate rhythm no murmur  Abdomen: Soft no palpable hepatosplenomegaly or masses  : Not examined  Musculoskeletal: Grossly normal  Skin: Warm and dry no jaundice no petechiae  Neuro: He is alert and actually he is oriented he is cooperative he is following all commands moving extremities well.  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  Extremities/P Vascular: No clubbing no cyanosis no edema palpable radial and dorsalis pedis pulses  MSE: Sort of a little flat affect but he responds appropriately       Labs:  Results from last 7 days   Lab Units 09/28/23  0914 09/27/23  1642 09/27/23  0609 09/27/23  0444 09/27/23  0442   GLUCOSE mg/dL 124* 98  --   --  252*   GLUCOSE, ARTERIAL mg/dL  --   --  272* 245*  --    SODIUM mmol/L 139 145  --   --  140   SODIUM, ARTERIAL mmol/L  --   --  141.4 143.5  --    POTASSIUM mmol/L 3.8 3.5  --   --  4.1   MAGNESIUM mg/dL  --   --   --   --  2.4   CO2 mmol/L 21.8* 21.8*  --   --  5.3*   CHLORIDE mmol/L 108* 111*  --   --  99   ANION GAP mmol/L 9.2 12.2  --   --  35.7*   CREATININE mg/dL 0.95 0.88  --   --  1.25   BUN mg/dL 12 13  --   --  12   BUN / CREAT RATIO  12.6 14.8  --   --  9.6   CALCIUM mg/dL 8.4* 8.2*  --   --  9.8   ALK PHOS U/L 54 55  --   --  117   TOTAL PROTEIN g/dL 6.2 5.9*  --   --  7.4   ALT (SGPT) U/L 21 23  --   --  30   AST (SGOT) U/L 25 29  --   --  28   BILIRUBIN mg/dL 0.9 0.8  --   --  0.3   ALBUMIN g/dL 3.9 4.0  --   --  5.1   GLOBULIN gm/dL 2.3 1.9  --   --  2.3     Estimated Creatinine Clearance: 112.4 mL/min (by C-G formula based on SCr of 0.95 mg/dL).  Results from last 7 days   Lab Units 09/27/23  1642   IONIZED CALCIUM mg/dL 4.5*     Results from last 7 days   Lab Units 09/27/23  1642 09/27/23  0442   WBC 10*3/mm3 11.33* 16.21*   RBC 10*6/mm3 4.45 5.05   HEMOGLOBIN g/dL 14.8 16.9   HEMATOCRIT % 43.6 50.9   MCV fL 98.0* 100.8*   MCH pg 33.3* 33.5*   MCHC g/dL 33.9 33.2    RDW % 13.6 13.5   RDW-SD fl 49.6 50.4   MPV fL 8.7 9.1   PLATELETS 10*3/mm3 226 362   NEUTROPHIL % %  --  40.4*   LYMPHOCYTE % %  --  44.2   MONOCYTES % %  --  9.3   EOSINOPHIL % %  --  2.0   BASOPHIL % %  --  1.1   IMM GRAN % %  --  3.0*   NEUTROS ABS 10*3/mm3  --  6.56   LYMPHS ABS 10*3/mm3  --  7.16*   MONOS ABS 10*3/mm3  --  1.51*   EOS ABS 10*3/mm3  --  0.32   BASOS ABS 10*3/mm3  --  0.18   IMMATURE GRANS (ABS) 10*3/mm3  --  0.48*   NRBC /100 WBC  --  0.1     Results from last 7 days   Lab Units 09/28/23  1343   PH, ARTERIAL pH units 7.396   PO2 ART mm Hg 66.7*   PCO2, ARTERIAL mm Hg 35.4   HCO3 ART mmol/L 21.7*     Results from last 7 days   Lab Units 09/27/23  1642 09/27/23  0842 09/27/23  0442   CK TOTAL U/L  --   --  188   HSTROP T ng/L 25* 46* 18*     Results from last 7 days   Lab Units 09/27/23  0442   PROBNP pg/mL <36.0     Results from last 7 days   Lab Units 09/27/23  1528 09/27/23  0442   TSH uIU/mL 0.457 1.760     Results from last 7 days   Lab Units 09/27/23  0842 09/27/23  0609 09/27/23  0444 09/27/23  0443   LACTATE mmol/L 1.4  --   --  25.1*   LACTATE, ARTERIAL mmol/L  --  9.22* 20.26*  --      Results from last 7 days   Lab Units 09/27/23  0442   INR  1.17*     Microbiology Results (last 10 days)       Procedure Component Value - Date/Time    Blood Culture - Blood, Arm, Right [[id number]]  (Normal) Collected: 09/27/23 0550    Lab Status: Preliminary result Specimen: Blood from Arm, Right Updated: 09/28/23 0565     Blood Culture No growth at 24 hours    Blood Culture - Blood, Arm, Left [[id number]]  (Normal) Collected: 09/27/23 0585    Lab Status: Preliminary result Specimen: Blood from Arm, Left Updated: 09/28/23 0523     Blood Culture No growth at 24 hours                chlorhexidine, 15 mL, Mouth/Throat, Q12H  enoxaparin, 40 mg, Subcutaneous, Q24H  famotidine, 20 mg, Intravenous, BID  folic acid (FOLVITE) IVPB, 1 mg, Intravenous, Daily  levETIRAcetam, 1,000 mg, Intravenous, Q12H  LORazepam,  1 mg, Intravenous, Q6H  multivitamin with minerals, 1 tablet, Oral, Daily  nicotine, 1 patch, Transdermal, Q24H  senna-docusate sodium, 2 tablet, Oral, BID  sodium chloride, 10 mL, Intravenous, Q12H  thiamine (B-1) IV, 200 mg, Intravenous, Q8H   Followed by  [START ON 10/2/2023] thiamine, 100 mg, Oral, Daily      dexmedetomidine, 0.2-1.5 mcg/kg/hr, Last Rate: 0.2 mcg/kg/hr (09/28/23 1150)  fentanyl 10 mcg/mL,  mcg/hr, Last Rate: 75 mcg/hr (09/28/23 0105)  propofol, 5-50 mcg/kg/min, Last Rate: Stopped (09/28/23 1150)  sodium chloride, 100 mL/hr, Last Rate: 100 mL/hr (09/28/23 0134)        Diagnostics:  EEG Continuous Monitoring With Video    Result Date: 9/28/2023  Date of onset: 9/28/2023 at 0006 Date of offset: 9/28/2023 at 0836 Indication: 47 year old man with seizure like activity.  Prolonged continuous video EEG for further evaluation.  Patient is intubated. Technical description:  This is a 21 channel digital EEG recording that began on 9/28/2023 at 0006 and ended on 9/28/2023 at 0836.  Electrodes are placed based on the 10-20 international electrode placement system.  Simultaneous video acquisition utilized. Background:  The EEG recording demonstrates moderate diffuse background slowing with mainly theta and delta frequencies.  No PDR is noted.  The patient is intubated and some sleep spindles are noted.  Hyperventilation and photic stimulation were not performed.  There are no asymmetries between the two hemispheres.  No interictal activity is present. The EKG monitor shows a heart rate is around 60 beats per minute. Clinical interpretation:  This prolonged 8 hours and 30 minutes continuous video EEG is abnormal due to the presence of moderate diffuse background slowing.  The results of this study are nonspecific, but may indicate a moderate encephalopathy or medication effect.  No potentially epileptogenic activity, seizure activity, or focal slowing is present.  Careful clinical correlation is  advised.       CT Head Without Contrast    Result Date: 9/27/2023  PROCEDURE: CT HEAD WO CONTRAST  COMPARISON: 9/1/2019.  INDICATIONS: 47-year-old male with seizures.  PROTOCOL:   Standard CT imaging protocol performed.    RADIATION:   Total DLP: 1,145.2 mGy*cm   MA and/or KV were/was adjusted to minimize radiation dose.    TECHNIQUE: After obtaining the patient's consent, 146 CT images were obtained without non-ionic intravenous contrast material.  FINDINGS: Encephalomalacia involves the inferior frontal lobes, greater on the left than the right, as well as the anterior temporal lobes, probably greater on the left, as well.  These findings may be related to chronic posttraumatic changes, such as hemorrhagic contusions.  Please correlate clinically.  No acute intracranial hemorrhage is seen.  No acute infarct.  No midline shift or acute intracranial herniation syndrome is seen.  No acute skull fracture is identified.  Mild-to-moderate age-indeterminate mucosal thickening involves the imaged paranasal sinuses.  No air-fluid interfaces are seen within the imaged paranasal sinuses.  No significant acute findings are seen with regard to the imaged orbits or middle ear clefts.  There is nonspecific diffuse prominence of the nasopharyngeal mucosal space which may be related to lymphoid hyperplasia.       No acute intracranial hemorrhage is seen.  No acute infarct.  No significant interval change is appreciated since the 09/01 2019 CT study with bifrontal and bi-temporal, left greater than right, posttraumatic encephalomalacia suspected, as detailed above.     Please note that portions of this note were completed with a voice recognition program.  PAPI SHERWOOD JR, MD       Electronically Signed and Approved By: PAPI SHERWOOD JR, MD on 9/27/2023 at 5:51              XR Chest 1 View    Result Date: 9/27/2023  Portable chest x-ray  HISTORY: Endotracheal tube placement.  TECHNIQUE: Portable chest x-ray is provided. No prior  exam for comparison.  FINDINGS: Endotracheal tube 35 mm above the bandar. Nasogastric tube coursing off the caudad edge of the field-of-view. Cardiomediastinal silhouette is normal and lungs are clear.      Hardware as described.  This report was finalized on 9/27/2023 1:19 PM by Dr. Gerard Vora M.D.      XR Chest 1 View    Result Date: 9/27/2023  PROCEDURE: XR CHEST 1 VW  COMPARISON: 2/6/2023.  INDICATIONS: ET TUBE PLACEMENT; IMAGING FOLLOW-UP.  FINDINGS:  A single AP supine portable chest radiograph is provided for review.  There has been interval endotracheal (ET) intubation.  The distal tip of the endotracheal (ET) tube is projected about 2.9 cm above the bandar.  It is in satisfactory position.  There are low lung volumes.  No acute infiltrate is seen.  Degenerative changes involve the imaged spine and the bilateral shoulders.  No pneumothorax is seen.  No pleural effusion.  There may be thoracic aortic atherosclerotic change.        The endotracheal (ET) tube is in satisfactory position.  No acute infiltrate is seen.  Probably no cardiac enlargement.      Please note that portions of this note were completed with a voice recognition program.  PAPI SHERWOOD JR, MD       Electronically Signed and Approved By: PAPI SHERWOOD JR, MD on 9/27/2023 at 5:42              XR Abdomen KUB    Result Date: 9/27/2023  PROCEDURE: XR ABDOMEN KUB  COMPARISON: [facility], CR, XR ABDOMEN KUB, 9/27/2023, 7:48.  INDICATIONS: Position of OG tube after repositioning per previous Xray recommendations  FINDINGS:  Nasogastric tube tip has been advanced with the tip lying in the area of the stomach.  Extraneous objects project over the abdomen obscuring the detail to some degree.  Bowel gas pattern is nonspecific.        1. Enteric tube has been advanced with the tip in the area of the stomach.     GLADYS NORWOOD MD       Electronically Signed and Approved By: GLADYS NORWOOD MD on 9/27/2023 at 9:31             XR Abdomen  KUB    Result Date: 9/27/2023  PROCEDURE: XR ABDOMEN KUB  COMPARISON: None  INDICATIONS: OG placement verification  FINDINGS:  The NG tube appears looped in the distal thoracic esophagus near the gastroesophageal junction.  The visualized bowel gas pattern is normal.        1. NG tube, as above.  Recommend retracting the tube approximately 5 cm and then advancing the tube 8 cm for better positioning.     Jamil Nunez M.D.       Electronically Signed and Approved By: Jamil Nunez M.D. on 9/27/2023 at 8:13                     Active Hospital Problems    Diagnosis  POA    **Status epilepticus [G40.901]  Yes      Resolved Hospital Problems   No resolved problems to display.         Assessment & Plan     Status epilepticus EEG is not showing any evidence of epileptiform activity at this time per neurology.  Continue management Keppra.  Neurology would like us to try and avoid Ativan unless prolonged seizures this may be difficult with alcohol withdrawal potential  Respiratory failure he is doing very well did a spontaneous breathing trial we just extubated him and he is doing well postextubation  Lactic acidosis probably secondary to seizure corrected quickly  Elevated troponin that did rise repeat was coming down I think no acute changes on EKG was probably all just stress related to the seizure activity.  A so-called type II demand ischemia MI  Alcohol use abuse he still had a mild level of alcohol present consistent with the history wife gives we will have to certainly watch for alcohol withdrawal put him on the alcohol withdrawal protocol and administer thiamine and folate.  I think he given the amount he drinks needs to be on the alcohol withdrawal protocol with the benzodiazepines going to be a tough situation I think if we do not and I think alcohol withdrawal is probably worse on him.  Tobacco abuse we will put on a nicotine patch he needs to stop smoking    Plan for disposition:    Gildardo Monroe Jr,  MD  09/28/23  14:09 EDT    Time: Critical care time about 36 minutes

## 2023-09-28 NOTE — CASE MANAGEMENT/SOCIAL WORK
Discharge Planning Assessment  [facility]     Patient Name: Agustin Stubbs  MRN: [id number]  Today's Date: 9/28/2023    Admit Date: 9/27/2023    Plan: Pending clinical course   Discharge Needs Assessment       Row Name 09/28/23 1407       Living Environment    People in Home significant other    Current Living Arrangements home    Primary Care Provided by self    Provides Primary Care For no one    Family Caregiver if Needed significant other    Able to Return to Prior Arrangements yes                   Discharge Plan       Row Name 09/28/23 1408       Plan    Plan Pending clinical course    Patient/Family in Agreement with Plan yes    Plan Comments CCP spoke with shabnam's significant other Kina at bedside; explained role and verified facesheet. Patient was independent for mobility and used no DME prior to admission. He lives with his significant other in a 2 level home with 5 steps to enter. He has no history of HH or SNF. Currently attempting to extubate patient now. CCP will follow for dc planning needs pending clinical course. MARVIN, SHANEW                  Continued Care and Services - Admitted Since 9/27/2023    Coordination has not been started for this encounter.          Demographic Summary       Row Name 09/28/23 1406       General Information    Admission Type inpatient    Arrived From home    Referral Source admission list    Reason for Consult discharge planning    Preferred Language English                   Functional Status       Row Name 09/28/23 1407       Functional Status    Usual Activity Tolerance good    Current Activity Tolerance good       Functional Status, IADL    Medications independent    Meal Preparation independent    Housekeeping independent    Laundry independent    Shopping independent       Mental Status    General Appearance WDL WDL                   Psychosocial    No documentation.                  Abuse/Neglect    No documentation.                  Legal    No  documentation.                  Substance Abuse    No documentation.                  Patient Forms    No documentation.                     GLADYS Garcia

## 2023-09-29 ENCOUNTER — APPOINTMENT (OUTPATIENT)
Dept: MRI IMAGING | Facility: HOSPITAL | Age: 48
DRG: 100 | End: 2023-09-29
Payer: COMMERCIAL

## 2023-09-29 PROBLEM — R09.02 HYPOXIA: Status: ACTIVE | Noted: 2023-09-29

## 2023-09-29 PROBLEM — F10.939 ALCOHOL WITHDRAWAL: Status: ACTIVE | Noted: 2023-09-29

## 2023-09-29 LAB
DEPRECATED RDW RBC AUTO: 47.7 FL (ref 37–54)
ERYTHROCYTE [DISTWIDTH] IN BLOOD BY AUTOMATED COUNT: 13.5 % (ref 12.3–15.4)
GLUCOSE BLDC GLUCOMTR-MCNC: 87 MG/DL (ref 70–130)
GLUCOSE BLDC GLUCOMTR-MCNC: 99 MG/DL (ref 70–130)
HCT VFR BLD AUTO: 39.3 % (ref 37.5–51)
HGB BLD-MCNC: 13.6 G/DL (ref 13–17.7)
MCH RBC QN AUTO: 33.3 PG (ref 26.6–33)
MCHC RBC AUTO-ENTMCNC: 34.6 G/DL (ref 31.5–35.7)
MCV RBC AUTO: 96.1 FL (ref 79–97)
PLATELET # BLD AUTO: 208 10*3/MM3 (ref 140–450)
PMV BLD AUTO: 8.7 FL (ref 6–12)
RBC # BLD AUTO: 4.09 10*6/MM3 (ref 4.14–5.8)
WBC NRBC COR # BLD: 10.52 10*3/MM3 (ref 3.4–10.8)

## 2023-09-29 PROCEDURE — 25010000002 LABETALOL 5 MG/ML SOLUTION: Performed by: INTERNAL MEDICINE

## 2023-09-29 PROCEDURE — 25010000002 THIAMINE HCL 200 MG/2ML SOLUTION: Performed by: INTERNAL MEDICINE

## 2023-09-29 PROCEDURE — 25010000002 ENOXAPARIN PER 10 MG: Performed by: INTERNAL MEDICINE

## 2023-09-29 PROCEDURE — 99233 SBSQ HOSP IP/OBS HIGH 50: CPT | Performed by: STUDENT IN AN ORGANIZED HEALTH CARE EDUCATION/TRAINING PROGRAM

## 2023-09-29 PROCEDURE — HZ2ZZZZ DETOXIFICATION SERVICES FOR SUBSTANCE ABUSE TREATMENT: ICD-10-PCS | Performed by: INTERNAL MEDICINE

## 2023-09-29 PROCEDURE — 25010000002 LEVETRIRACETAM PER 10 MG: Performed by: INTERNAL MEDICINE

## 2023-09-29 PROCEDURE — 25010000002 LORAZEPAM PER 2 MG: Performed by: INTERNAL MEDICINE

## 2023-09-29 PROCEDURE — 82948 REAGENT STRIP/BLOOD GLUCOSE: CPT

## 2023-09-29 PROCEDURE — 85027 COMPLETE CBC AUTOMATED: CPT | Performed by: INTERNAL MEDICINE

## 2023-09-29 RX ORDER — LISINOPRIL 10 MG/1
10 TABLET ORAL
Status: DISCONTINUED | OUTPATIENT
Start: 2023-09-29 | End: 2023-10-06 | Stop reason: HOSPADM

## 2023-09-29 RX ORDER — HYDROCHLOROTHIAZIDE 12.5 MG/1
12.5 TABLET ORAL
Status: DISCONTINUED | OUTPATIENT
Start: 2023-09-29 | End: 2023-10-06 | Stop reason: HOSPADM

## 2023-09-29 RX ADMIN — HYDROCHLOROTHIAZIDE 12.5 MG: 12.5 TABLET ORAL at 11:10

## 2023-09-29 RX ADMIN — THIAMINE HYDROCHLORIDE 200 MG: 100 INJECTION, SOLUTION INTRAMUSCULAR; INTRAVENOUS at 22:02

## 2023-09-29 RX ADMIN — ENOXAPARIN SODIUM 40 MG: 100 INJECTION SUBCUTANEOUS at 13:55

## 2023-09-29 RX ADMIN — ACETAMINOPHEN 650 MG: 325 TABLET, FILM COATED ORAL at 08:20

## 2023-09-29 RX ADMIN — FAMOTIDINE 20 MG: 10 INJECTION INTRAVENOUS at 08:19

## 2023-09-29 RX ADMIN — THIAMINE HYDROCHLORIDE 200 MG: 100 INJECTION, SOLUTION INTRAMUSCULAR; INTRAVENOUS at 06:17

## 2023-09-29 RX ADMIN — LABETALOL 20 MG/4 ML (5 MG/ML) INTRAVENOUS SYRINGE 20 MG: at 08:36

## 2023-09-29 RX ADMIN — LORAZEPAM 1 MG: 2 INJECTION INTRAMUSCULAR; INTRAVENOUS at 20:03

## 2023-09-29 RX ADMIN — LORAZEPAM 1 MG: 2 INJECTION INTRAMUSCULAR; INTRAVENOUS at 12:02

## 2023-09-29 RX ADMIN — Medication 10 ML: at 08:26

## 2023-09-29 RX ADMIN — LISINOPRIL 10 MG: 10 TABLET ORAL at 11:10

## 2023-09-29 RX ADMIN — THIAMINE HYDROCHLORIDE 200 MG: 100 INJECTION, SOLUTION INTRAMUSCULAR; INTRAVENOUS at 13:54

## 2023-09-29 RX ADMIN — NICOTINE 1 PATCH: 21 PATCH, EXTENDED RELEASE TRANSDERMAL at 13:54

## 2023-09-29 RX ADMIN — LORAZEPAM 1 MG: 2 INJECTION INTRAMUSCULAR; INTRAVENOUS at 00:10

## 2023-09-29 RX ADMIN — Medication 10 ML: at 20:02

## 2023-09-29 RX ADMIN — SENNOSIDES AND DOCUSATE SODIUM 2 TABLET: 50; 8.6 TABLET ORAL at 08:19

## 2023-09-29 RX ADMIN — FOLIC ACID 1 MG: 5 INJECTION, SOLUTION INTRAMUSCULAR; INTRAVENOUS; SUBCUTANEOUS at 08:21

## 2023-09-29 RX ADMIN — FAMOTIDINE 20 MG: 10 INJECTION INTRAVENOUS at 20:02

## 2023-09-29 RX ADMIN — LEVETIRACETAM 1000 MG: 100 INJECTION INTRAVENOUS at 20:02

## 2023-09-29 RX ADMIN — LABETALOL 20 MG/4 ML (5 MG/ML) INTRAVENOUS SYRINGE 20 MG: at 06:17

## 2023-09-29 RX ADMIN — LORAZEPAM 1 MG: 2 INJECTION INTRAMUSCULAR; INTRAVENOUS at 06:17

## 2023-09-29 RX ADMIN — LEVETIRACETAM 1000 MG: 100 INJECTION INTRAVENOUS at 08:19

## 2023-09-29 RX ADMIN — MULTIPLE VITAMINS W/ MINERALS TAB 1 TABLET: TAB at 08:19

## 2023-09-29 NOTE — SIGNIFICANT NOTE
Patient transferred to , all belongings sent with wife to new room. Patient agreeable to transfer, updated on plan of care. Expresses concern about length of stay, but is agreeable to treatment plan. Medications sent with transporter. Report called prior to receiving RN

## 2023-09-29 NOTE — PROGRESS NOTES
DOS: 2023  NAME: Agustin Stubbs   : 1975  PCP: Indy Moffett APRN  No chief complaint on file.      Chief complaint: Seizures  Subjective: Patient seen and examined at the bedside this morning, patient got extubated currently off sedation but was getting scheduled Ativan for [facility] protocol for alcohol withdrawal.  He was drowsy but follows all commands and moves all extremities to commands.  Continuous EEG showed no epileptiform discharges.    Objective:  Vital signs: /99   Pulse 98   Temp 98 °F (36.7 °C) (Oral)   Resp 20   Wt 96.1 kg (211 lb 13.8 oz)   SpO2 96%   BMI 29.56 kg/m²    Gen: Drowsy, vitals reviewed  MS: Oriented to self, poor attention, follows all commands, language grossly intact, no neglect.  CN: visual acuity grossly normal, PERRL, EOMI, no facial droop, no dysarthria  Motor: Moves all extremities against gravity, normal tone  Sensory: intact to light touch all 4 ext.    Laboratory results:  Lab Results   Component Value Date    GLUCOSE 124 (H) 2023    CALCIUM 8.4 (L) 2023     2023    K 3.8 2023    CO2 21.8 (L) 2023     (H) 2023    BUN 12 2023    CREATININE 0.95 2023    BCR 12.6 2023    ANIONGAP 9.2 2023     Lab Results   Component Value Date    WBC 10.52 2023    HGB 13.6 2023    HCT 39.3 2023    MCV 96.1 2023     2023     Lab Results   Component Value Date     (H) 2023     (H) 2022    LDL 96 2021            Review of labs: CBC unremarkable sodium 139, BUN 12, creatinine 0.95.    Review and interpretation of imaging: MRI brain pending    Continuous EEG showed no seizure activity as detailed below    Date of onset: 2023 at 0006  Date of offset: 2023 at 0836     Indication: 47 year old man with seizure like activity.  Prolonged continuous video EEG for further evaluation.  Patient is intubated.      Technical description:   This is a 21 channel digital EEG recording that began on 9/28/2023 at 0006 and ended on 9/28/2023 at 0836.  Electrodes are placed based on the 10-20 international electrode placement system.  Simultaneous video acquisition utilized.      Background:  The EEG recording demonstrates moderate diffuse background slowing with mainly theta and delta frequencies.  No PDR is noted.  The patient is intubated and some sleep spindles are noted.  Hyperventilation and photic stimulation were not performed.  There are no asymmetries between the two hemispheres.  No interictal activity is present.     The EKG monitor shows a heart rate is around 60 beats per minute.     Clinical interpretation:  This prolonged 8 hours and 30 minutes continuous video EEG is abnormal due to the presence of moderate diffuse background slowing.  The results of this study are nonspecific, but may indicate a moderate encephalopathy or medication effect.  No potentially epileptogenic activity, seizure activity, or focal slowing is present.  Careful clinical correlation is advised.            Diagnoses:  Seizure disorder with traumatic brain injury  Possible alcohol withdrawal        Plan:  1.  Okay to transfer the patient out of the ICU  2.  Continue Keppra 1 g twice daily  3.  DC continuous EEG  4.  Continue CIWA protocol as indicated  5.  Continue thiamine replacement  6.  Seizure precautions discussed with the patient and wife at the bedside do not drive for 90 days from the last seizure, do not operate heavy machines, do not approach often prior, do not get on room or ladder, do not swim unsupervised, do not do any activity that would put you in danger in case you do have a seizure.  Patient and wife stated understanding.    Neurology will follow peripherally on the MRI brain results.    MA contacted/texted to arrange a follow-up with Dr. Xie in 1 to 2 months.    Management discussed with patient, family, will discuss with ICU team.    Addendum:  10/1/2023  I personally reviewed the MRI brain with and without contrast which showed no acute abnormality.  There was a chronic left frontal encephalomalacia related to the prior traumatic brain injury.    MA already contacted to arrange follow-up with neurology, no further inpatient work-up needed we will sign off and see again as needed.

## 2023-09-29 NOTE — PLAN OF CARE
Problem: Skin Injury Risk Increased  Goal: Skin Health and Integrity  Outcome: Ongoing, Progressing  Intervention: Optimize Skin Protection  Recent Flowsheet Documentation  Taken 9/29/2023 1700 by Corwin Bingham RN  Pressure Reduction Techniques:   weight shift assistance provided   frequent weight shift encouraged  Head of Bed (HOB) Positioning: HOB at 30-45 degrees  Pressure Reduction Devices: positioning supports utilized  Skin Protection: adhesive use limited     Problem: Skin Injury Risk Increased  Goal: Skin Health and Integrity  Intervention: Optimize Skin Protection  Recent Flowsheet Documentation  Taken 9/29/2023 1700 by Corwin Bingham RN  Pressure Reduction Techniques:   weight shift assistance provided   frequent weight shift encouraged  Head of Bed (HOB) Positioning: HOB at 30-45 degrees  Pressure Reduction Devices: positioning supports utilized  Skin Protection: adhesive use limited     Problem: Fall Injury Risk  Goal: Absence of Fall and Fall-Related Injury  Outcome: Ongoing, Progressing  Intervention: Identify and Manage Contributors  Recent Flowsheet Documentation  Taken 9/29/2023 1700 by Corwin Bingham RN  Medication Review/Management: medications reviewed  Intervention: Promote Injury-Free Environment  Recent Flowsheet Documentation  Taken 9/29/2023 1700 by Corwin Bingham RN  Safety Promotion/Fall Prevention:   safety round/check completed   room organization consistent     Problem: Fall Injury Risk  Goal: Absence of Fall and Fall-Related Injury  Intervention: Identify and Manage Contributors  Recent Flowsheet Documentation  Taken 9/29/2023 1700 by Corwin Bingham RN  Medication Review/Management: medications reviewed     Problem: Fall Injury Risk  Goal: Absence of Fall and Fall-Related Injury  Intervention: Promote Injury-Free Environment  Recent Flowsheet Documentation  Taken 9/29/2023 1700 by Corwin Bingham RN  Safety Promotion/Fall Prevention:   safety round/check completed   room  organization consistent   Goal Outcome Evaluation:

## 2023-09-29 NOTE — PROGRESS NOTES
LOS: 2 days   Patient Care Team:  Indy Moffett APRN as PCP - General (Nurse Practitioner)  Meera Ballard APRN as Nurse Practitioner (Nurse Practitioner)    Subjective     Patient is awake he is a little somnolent but is following commands and alert and oriented does not have any specific complaints    Review of Systems:          Objective     Vital Signs  Vital Sign Min/Max for last 24 hours  Temp  Min: 98 °F (36.7 °C)  Max: 99.5 °F (37.5 °C)   BP  Min: 143/102  Max: 177/99   Pulse  Min: 74  Max: 105   No data recorded   SpO2  Min: 93 %  Max: 98 %   No data recorded   Weight  Min: 95.7 kg (211 lb)  Max: 96.1 kg (211 lb 13.8 oz)        Ventilator/Non-Invasive Ventilation Settings (From admission, onward)       Start     Ordered    23 1210  Ventilator - Vent Mode: AC/VC; Rate: 20; FiO2: Titrate Per SpO2; Titrate Oxygen for SpO2: 90 - 95%; PEEP: 5; Tidal Volume: mL/kg PBW; mL/k  (Ventilator Settings & Bundle)  Continuous,   Status:  Canceled        Question Answer Comment   Vent Mode AC/VC    Rate 20    FiO2 Titrate Per SpO2    Titrate Oxygen for SpO2 90 - 95%    PEEP 5    Tidal Volume mL/kg PBW    mL/kg 7        23 1212                                 Body mass index is 29.56 kg/m².  I/O last 3 completed shifts:  In: 1604 [I.V.:1604]  Out: 1450 [Urine:1450]  I/O this shift:  In: 240 [P.O.:240]  Out: -         Physical Exam:  General Appearance: Well-developed white male he is resting comfortably in bed now he does not appear in acute distress  Eyes: GEN TIVA are clear and anicteric pupils are about 3 mm equal and reactive to light and extraocular muscles are intact  ENT: His membranes are moist no erythema no exudates Mallampati type II airway  Neck: Trachea midline no palpable adenopathy no visible jugular venous distention  Lungs: Clear nonlabored symmetric expansion  Cardiac: Regular rate rhythm no murmur  Abdomen: Soft no palpable hepatosplenomegaly or masses  : Not  examined  Musculoskeletal: Grossly normal  Skin: Warm and dry no jaundice no petechiae  Neuro: He is alert and actually he is oriented he is cooperative he is following all commands moving extremities well.   Extremities/P Vascular: No clubbing no cyanosis no edema palpable radial and dorsalis pedis pulses  MSE: Sort of a little flat affect but he responds appropriately       Labs:  Results from last 7 days   Lab Units 09/28/23  0914 09/27/23  1642 09/27/23  0609 09/27/23  0444 09/27/23  0442   GLUCOSE mg/dL 124* 98  --   --  252*   GLUCOSE, ARTERIAL mg/dL  --   --  272* 245*  --    SODIUM mmol/L 139 145  --   --  140   SODIUM, ARTERIAL mmol/L  --   --  141.4 143.5  --    POTASSIUM mmol/L 3.8 3.5  --   --  4.1   MAGNESIUM mg/dL  --   --   --   --  2.4   CO2 mmol/L 21.8* 21.8*  --   --  5.3*   CHLORIDE mmol/L 108* 111*  --   --  99   ANION GAP mmol/L 9.2 12.2  --   --  35.7*   CREATININE mg/dL 0.95 0.88  --   --  1.25   BUN mg/dL 12 13  --   --  12   BUN / CREAT RATIO  12.6 14.8  --   --  9.6   CALCIUM mg/dL 8.4* 8.2*  --   --  9.8   ALK PHOS U/L 54 55  --   --  117   TOTAL PROTEIN g/dL 6.2 5.9*  --   --  7.4   ALT (SGPT) U/L 21 23  --   --  30   AST (SGOT) U/L 25 29  --   --  28   BILIRUBIN mg/dL 0.9 0.8  --   --  0.3   ALBUMIN g/dL 3.9 4.0  --   --  5.1   GLOBULIN gm/dL 2.3 1.9  --   --  2.3     Estimated Creatinine Clearance: 112.4 mL/min (by C-G formula based on SCr of 0.95 mg/dL).  Results from last 7 days   Lab Units 09/27/23  1642   IONIZED CALCIUM mg/dL 4.5*     Results from last 7 days   Lab Units 09/29/23  0338 09/27/23  1642 09/27/23  0442   WBC 10*3/mm3 10.52 11.33* 16.21*   RBC 10*6/mm3 4.09* 4.45 5.05   HEMOGLOBIN g/dL 13.6 14.8 16.9   HEMATOCRIT % 39.3 43.6 50.9   MCV fL 96.1 98.0* 100.8*   MCH pg 33.3* 33.3* 33.5*   MCHC g/dL 34.6 33.9 33.2   RDW % 13.5 13.6 13.5   RDW-SD fl 47.7 49.6 50.4   MPV fL 8.7 8.7 9.1   PLATELETS 10*3/mm3 208 226 362   NEUTROPHIL % %  --   --  40.4*   LYMPHOCYTE % %  --   --   44.2   MONOCYTES % %  --   --  9.3   EOSINOPHIL % %  --   --  2.0   BASOPHIL % %  --   --  1.1   IMM GRAN % %  --   --  3.0*   NEUTROS ABS 10*3/mm3  --   --  6.56   LYMPHS ABS 10*3/mm3  --   --  7.16*   MONOS ABS 10*3/mm3  --   --  1.51*   EOS ABS 10*3/mm3  --   --  0.32   BASOS ABS 10*3/mm3  --   --  0.18   IMMATURE GRANS (ABS) 10*3/mm3  --   --  0.48*   NRBC /100 WBC  --   --  0.1     Results from last 7 days   Lab Units 09/28/23  1343   PH, ARTERIAL pH units 7.396   PO2 ART mm Hg 66.7*   PCO2, ARTERIAL mm Hg 35.4   HCO3 ART mmol/L 21.7*     Results from last 7 days   Lab Units 09/27/23  1642 09/27/23  0842 09/27/23  0442   CK TOTAL U/L  --   --  188   HSTROP T ng/L 25* 46* 18*     Results from last 7 days   Lab Units 09/27/23  0442   PROBNP pg/mL <36.0     Results from last 7 days   Lab Units 09/27/23  1528 09/27/23  0442   TSH uIU/mL 0.457 1.760     Results from last 7 days   Lab Units 09/27/23  0842 09/27/23  0609 09/27/23  0444 09/27/23  0443   LACTATE mmol/L 1.4  --   --  25.1*   LACTATE, ARTERIAL mmol/L  --  9.22* 20.26*  --      Results from last 7 days   Lab Units 09/27/23  0442   INR  1.17*     Microbiology Results (last 10 days)       Procedure Component Value - Date/Time    Blood Culture - Blood, Arm, Right [[id number]]  (Normal) Collected: 09/27/23 0533    Lab Status: Preliminary result Specimen: Blood from Arm, Right Updated: 09/29/23 0545     Blood Culture No growth at 2 days    Blood Culture - Blood, Arm, Left [[id number]]  (Normal) Collected: 09/27/23 0533    Lab Status: Preliminary result Specimen: Blood from Arm, Left Updated: 09/29/23 0545     Blood Culture No growth at 2 days                enoxaparin, 40 mg, Subcutaneous, Q24H  famotidine, 20 mg, Intravenous, BID  folic acid (FOLVITE) IVPB, 1 mg, Intravenous, Daily  lisinopril, 10 mg, Oral, Q24H   And  hydroCHLOROthiazide, 12.5 mg, Oral, Q24H  levETIRAcetam, 1,000 mg, Intravenous, Q12H  LORazepam, 1 mg, Intravenous, Q6H  multivitamin with  minerals, 1 tablet, Oral, Daily  nicotine, 1 patch, Transdermal, Q24H  senna-docusate sodium, 2 tablet, Oral, BID  sodium chloride, 10 mL, Intravenous, Q12H  thiamine (B-1) IV, 200 mg, Intravenous, Q8H   Followed by  [START ON 10/2/2023] thiamine, 100 mg, Oral, Daily      sodium chloride, 100 mL/hr, Last Rate: 100 mL/hr (09/28/23 0134)        Diagnostics:  EEG Continuous Monitoring With Video    Result Date: 9/28/2023  Date of onset: 9/28/2023 at 0006 Date of offset: 9/28/2023 at 0836 Indication: 47 year old man with seizure like activity.  Prolonged continuous video EEG for further evaluation.  Patient is intubated. Technical description:  This is a 21 channel digital EEG recording that began on 9/28/2023 at 0006 and ended on 9/28/2023 at 0836.  Electrodes are placed based on the 10-20 international electrode placement system.  Simultaneous video acquisition utilized. Background:  The EEG recording demonstrates moderate diffuse background slowing with mainly theta and delta frequencies.  No PDR is noted.  The patient is intubated and some sleep spindles are noted.  Hyperventilation and photic stimulation were not performed.  There are no asymmetries between the two hemispheres.  No interictal activity is present. The EKG monitor shows a heart rate is around 60 beats per minute. Clinical interpretation:  This prolonged 8 hours and 30 minutes continuous video EEG is abnormal due to the presence of moderate diffuse background slowing.  The results of this study are nonspecific, but may indicate a moderate encephalopathy or medication effect.  No potentially epileptogenic activity, seizure activity, or focal slowing is present.  Careful clinical correlation is advised.       CT Head Without Contrast    Result Date: 9/27/2023  PROCEDURE: CT HEAD WO CONTRAST  COMPARISON: 9/1/2019.  INDICATIONS: 47-year-old male with seizures.  PROTOCOL:   Standard CT imaging protocol performed.    RADIATION:   Total DLP: 1,145.2 mGy*cm    MA and/or KV were/was adjusted to minimize radiation dose.    TECHNIQUE: After obtaining the patient's consent, 146 CT images were obtained without non-ionic intravenous contrast material.  FINDINGS: Encephalomalacia involves the inferior frontal lobes, greater on the left than the right, as well as the anterior temporal lobes, probably greater on the left, as well.  These findings may be related to chronic posttraumatic changes, such as hemorrhagic contusions.  Please correlate clinically.  No acute intracranial hemorrhage is seen.  No acute infarct.  No midline shift or acute intracranial herniation syndrome is seen.  No acute skull fracture is identified.  Mild-to-moderate age-indeterminate mucosal thickening involves the imaged paranasal sinuses.  No air-fluid interfaces are seen within the imaged paranasal sinuses.  No significant acute findings are seen with regard to the imaged orbits or middle ear clefts.  There is nonspecific diffuse prominence of the nasopharyngeal mucosal space which may be related to lymphoid hyperplasia.       No acute intracranial hemorrhage is seen.  No acute infarct.  No significant interval change is appreciated since the 09/01 2019 CT study with bifrontal and bi-temporal, left greater than right, posttraumatic encephalomalacia suspected, as detailed above.     Please note that portions of this note were completed with a voice recognition program.  PAPI SHERWOOD JR, MD       Electronically Signed and Approved By: PAPI SHERWOOD JR, MD on 9/27/2023 at 5:51              XR Chest 1 View    Result Date: 9/27/2023  Portable chest x-ray  HISTORY: Endotracheal tube placement.  TECHNIQUE: Portable chest x-ray is provided. No prior exam for comparison.  FINDINGS: Endotracheal tube 35 mm above the bandar. Nasogastric tube coursing off the caudad edge of the field-of-view. Cardiomediastinal silhouette is normal and lungs are clear.      Hardware as described.  This report was finalized on  9/27/2023 1:19 PM by Dr. Gerard Vora M.D.      XR Chest 1 View    Result Date: 9/27/2023  PROCEDURE: XR CHEST 1 VW  COMPARISON: 2/6/2023.  INDICATIONS: ET TUBE PLACEMENT; IMAGING FOLLOW-UP.  FINDINGS:  A single AP supine portable chest radiograph is provided for review.  There has been interval endotracheal (ET) intubation.  The distal tip of the endotracheal (ET) tube is projected about 2.9 cm above the bandar.  It is in satisfactory position.  There are low lung volumes.  No acute infiltrate is seen.  Degenerative changes involve the imaged spine and the bilateral shoulders.  No pneumothorax is seen.  No pleural effusion.  There may be thoracic aortic atherosclerotic change.        The endotracheal (ET) tube is in satisfactory position.  No acute infiltrate is seen.  Probably no cardiac enlargement.      Please note that portions of this note were completed with a voice recognition program.  PAPI SHERWOOD JR, MD       Electronically Signed and Approved By: PAPI SHERWOOD JR, MD on 9/27/2023 at 5:42              XR Abdomen KUB    Result Date: 9/27/2023  PROCEDURE: XR ABDOMEN KUB  COMPARISON: [facility], , XR ABDOMEN KUB, 9/27/2023, 7:48.  INDICATIONS: Position of OG tube after repositioning per previous Xray recommendations  FINDINGS:  Nasogastric tube tip has been advanced with the tip lying in the area of the stomach.  Extraneous objects project over the abdomen obscuring the detail to some degree.  Bowel gas pattern is nonspecific.        1. Enteric tube has been advanced with the tip in the area of the stomach.     GLADYS NORWOOD MD       Electronically Signed and Approved By: GLADYS NORWOOD MD on 9/27/2023 at 9:31             XR Abdomen KUB    Result Date: 9/27/2023  PROCEDURE: XR ABDOMEN KUB  COMPARISON: None  INDICATIONS: OG placement verification  FINDINGS:  The NG tube appears looped in the distal thoracic esophagus near the gastroesophageal junction.  The visualized bowel gas pattern is  normal.        1. NG tube, as above.  Recommend retracting the tube approximately 5 cm and then advancing the tube 8 cm for better positioning.     Jamil Nunez M.D.       Electronically Signed and Approved By: Jamil Nunez M.D. on 9/27/2023 at 8:13                     Active Hospital Problems    Diagnosis  POA    **Status epilepticus [G40.901]  Yes      Resolved Hospital Problems   No resolved problems to display.         Assessment & Plan     Status epilepticus EEG is not showing any evidence of epileptiform activity at this time per neurology.  Continue management [place].  Discussed with joshua Balderas out of ICU he will need to be observed in hospital for at least another day or 2 procedures and needs to get through alcohol withdrawal.  Respiratory failure he is doing very well did a spontaneous breathing trial we just extubated him and he is doing well postextubation  Lactic acidosis probably secondary to seizure corrected quickly  Elevated troponin that did rise repeat was coming down I think no acute changes on EKG was probably all just stress related to the seizure activity.  A so-called type II demand ischemia MI.  He might be a candidate for stress test down the road just to rule out significant cardiac disease given his smoking history etc.  Alcohol use abuse he still had a mild level of alcohol present consistent with the history wife gives he is very high risk for alcohol withdrawal we have him on the alcohol withdrawal protocol he is not showing signs of withdrawal but he is going to need several more days of medications to safely through this.  I have discussed with the patient and his wife at length about the need for abstinence from alcohol.  Tobacco abuse we will put on a nicotine patch he needs to stop smoking    Plan for disposition:    Gildardo Monroe Jr, MD  09/29/23  13:07 EDT    Time: 37 minutes patient care today

## 2023-09-30 ENCOUNTER — APPOINTMENT (OUTPATIENT)
Dept: MRI IMAGING | Facility: HOSPITAL | Age: 48
DRG: 100 | End: 2023-09-30
Payer: COMMERCIAL

## 2023-09-30 PROCEDURE — 25010000002 LEVETRIRACETAM PER 10 MG: Performed by: INTERNAL MEDICINE

## 2023-09-30 PROCEDURE — 25010000002 LORAZEPAM PER 2 MG: Performed by: INTERNAL MEDICINE

## 2023-09-30 PROCEDURE — 25010000002 LORAZEPAM PER 2 MG: Performed by: STUDENT IN AN ORGANIZED HEALTH CARE EDUCATION/TRAINING PROGRAM

## 2023-09-30 PROCEDURE — 0 GADOBENATE DIMEGLUMINE 529 MG/ML SOLUTION: Performed by: INTERNAL MEDICINE

## 2023-09-30 PROCEDURE — 25010000002 THIAMINE HCL 200 MG/2ML SOLUTION: Performed by: INTERNAL MEDICINE

## 2023-09-30 PROCEDURE — 25010000002 PHENOBARBITAL PER 120 MG: Performed by: NURSE PRACTITIONER

## 2023-09-30 PROCEDURE — 25010000002 ENOXAPARIN PER 10 MG: Performed by: INTERNAL MEDICINE

## 2023-09-30 PROCEDURE — A9577 INJ MULTIHANCE: HCPCS | Performed by: INTERNAL MEDICINE

## 2023-09-30 PROCEDURE — 70553 MRI BRAIN STEM W/O & W/DYE: CPT

## 2023-09-30 PROCEDURE — 90791 PSYCH DIAGNOSTIC EVALUATION: CPT

## 2023-09-30 RX ORDER — PHENOBARBITAL 32.4 MG/1
32.4 TABLET ORAL ONCE
Status: COMPLETED | OUTPATIENT
Start: 2023-10-03 | End: 2023-10-03

## 2023-09-30 RX ORDER — METOPROLOL SUCCINATE 25 MG/1
25 TABLET, EXTENDED RELEASE ORAL
Status: DISCONTINUED | OUTPATIENT
Start: 2023-09-30 | End: 2023-10-06 | Stop reason: HOSPADM

## 2023-09-30 RX ORDER — PHENOBARBITAL SODIUM 65 MG/ML
65 INJECTION INTRAMUSCULAR ONCE
Status: COMPLETED | OUTPATIENT
Start: 2023-10-01 | End: 2023-10-01

## 2023-09-30 RX ORDER — LORAZEPAM 2 MG/ML
2 INJECTION INTRAMUSCULAR ONCE
Status: COMPLETED | OUTPATIENT
Start: 2023-09-30 | End: 2023-09-30

## 2023-09-30 RX ORDER — PHENOBARBITAL 32.4 MG/1
32.4 TABLET ORAL ONCE
Status: COMPLETED | OUTPATIENT
Start: 2023-10-02 | End: 2023-10-02

## 2023-09-30 RX ORDER — PHENOBARBITAL SODIUM 65 MG/ML
65 INJECTION INTRAMUSCULAR ONCE
Status: COMPLETED | OUTPATIENT
Start: 2023-10-02 | End: 2023-10-02

## 2023-09-30 RX ADMIN — LORAZEPAM 1 MG: 2 INJECTION INTRAMUSCULAR; INTRAVENOUS at 06:40

## 2023-09-30 RX ADMIN — HYDROCHLOROTHIAZIDE 12.5 MG: 12.5 TABLET ORAL at 08:56

## 2023-09-30 RX ADMIN — LORAZEPAM 2 MG: 2 INJECTION INTRAMUSCULAR; INTRAVENOUS at 23:51

## 2023-09-30 RX ADMIN — NICOTINE 1 PATCH: 21 PATCH, EXTENDED RELEASE TRANSDERMAL at 15:27

## 2023-09-30 RX ADMIN — THIAMINE HYDROCHLORIDE 200 MG: 100 INJECTION, SOLUTION INTRAMUSCULAR; INTRAVENOUS at 22:18

## 2023-09-30 RX ADMIN — FAMOTIDINE 20 MG: 10 INJECTION INTRAVENOUS at 20:52

## 2023-09-30 RX ADMIN — LORAZEPAM 1 MG: 2 INJECTION INTRAMUSCULAR; INTRAVENOUS at 20:52

## 2023-09-30 RX ADMIN — GADOBENATE DIMEGLUMINE 20 ML: 529 INJECTION, SOLUTION INTRAVENOUS at 19:54

## 2023-09-30 RX ADMIN — Medication 10 ML: at 20:52

## 2023-09-30 RX ADMIN — LORAZEPAM 2 MG: 2 INJECTION INTRAMUSCULAR; INTRAVENOUS at 23:32

## 2023-09-30 RX ADMIN — LEVETIRACETAM 1000 MG: 100 INJECTION INTRAVENOUS at 20:52

## 2023-09-30 RX ADMIN — METOPROLOL SUCCINATE 25 MG: 25 TABLET, EXTENDED RELEASE ORAL at 15:28

## 2023-09-30 RX ADMIN — LORAZEPAM 1 MG: 2 INJECTION INTRAMUSCULAR; INTRAVENOUS at 18:53

## 2023-09-30 RX ADMIN — ENOXAPARIN SODIUM 40 MG: 100 INJECTION SUBCUTANEOUS at 15:29

## 2023-09-30 RX ADMIN — LORAZEPAM 1 MG: 2 INJECTION INTRAMUSCULAR; INTRAVENOUS at 00:11

## 2023-09-30 RX ADMIN — THIAMINE HYDROCHLORIDE 200 MG: 100 INJECTION, SOLUTION INTRAMUSCULAR; INTRAVENOUS at 06:40

## 2023-09-30 RX ADMIN — FOLIC ACID 1 MG: 5 INJECTION, SOLUTION INTRAMUSCULAR; INTRAVENOUS; SUBCUTANEOUS at 15:28

## 2023-09-30 RX ADMIN — LORAZEPAM 2 MG: 2 INJECTION INTRAMUSCULAR; INTRAVENOUS at 22:37

## 2023-09-30 RX ADMIN — LORAZEPAM 2 MG: 2 INJECTION INTRAMUSCULAR; INTRAVENOUS at 22:52

## 2023-09-30 RX ADMIN — LISINOPRIL 10 MG: 10 TABLET ORAL at 08:56

## 2023-09-30 RX ADMIN — LORAZEPAM 2 MG: 2 INJECTION INTRAMUSCULAR; INTRAVENOUS at 15:29

## 2023-09-30 RX ADMIN — MULTIPLE VITAMINS W/ MINERALS TAB 1 TABLET: TAB at 08:56

## 2023-09-30 RX ADMIN — SODIUM CHLORIDE 100 ML/HR: 9 INJECTION, SOLUTION INTRAVENOUS at 09:06

## 2023-09-30 RX ADMIN — PHENOBARBITAL SODIUM 150.8 MG: 65 INJECTION INTRAMUSCULAR at 23:45

## 2023-09-30 RX ADMIN — LORAZEPAM 2 MG: 2 INJECTION INTRAMUSCULAR; INTRAVENOUS at 22:15

## 2023-09-30 RX ADMIN — LORAZEPAM 2 MG: 2 INJECTION INTRAMUSCULAR; INTRAVENOUS at 23:10

## 2023-09-30 RX ADMIN — LORAZEPAM 2 MG: 2 INJECTION INTRAMUSCULAR; INTRAVENOUS at 21:22

## 2023-09-30 RX ADMIN — THIAMINE HYDROCHLORIDE 200 MG: 100 INJECTION, SOLUTION INTRAMUSCULAR; INTRAVENOUS at 15:28

## 2023-09-30 RX ADMIN — LORAZEPAM 1 MG: 2 INJECTION INTRAMUSCULAR; INTRAVENOUS at 17:21

## 2023-09-30 RX ADMIN — FAMOTIDINE 20 MG: 10 INJECTION INTRAVENOUS at 08:56

## 2023-09-30 RX ADMIN — Medication 10 ML: at 09:03

## 2023-09-30 RX ADMIN — LEVETIRACETAM 1000 MG: 100 INJECTION INTRAVENOUS at 09:02

## 2023-09-30 NOTE — CONSULTS
"Patient seen by [facility] d/t ETOH. Patient interviewed in room 515 (5S) with multiple family members at bedside. Patient gave permission for family members to remain present. Introduced self and role. Patient agreed to be evaluated. Patient is A/OX4.    The patient is a 47 y.o.  male living with his wife.  Nondenominational/Spiritual: No  Children: 3 children, 2 step-children, 2 Grandchildren  Occupation: [employer], works with gas lines  Hobbies: Stand up Jet Ski, Cooking, Music  Legal: Denies  : [employer] for 4 years  Support System: Family, Friends  Hx of Violence: Denies  Hx of Abuse/Trauma: Denies  Feel safe at Home: Yes    The patient presented to [facility] ED on 9/27/23 after family witnessed him having a seizure. The patient required ICU bed and was intubated/sedated. Patient extubated on 9/28/23 and transferred to [address] [place] [address]. Patient has a history of ETOH abuse.    The patient stated he typically drinks ETOH 6 days/week. The patient stated he typically has 6 beers/day. The patient stated he has been drinking this much for the past 10 years. The patient last drink was 9/26/23. The patient denied any history of ETOH withdrawal. The patient denied blackouts but stated he might have some memory loss sometimes. The patient denied any significant period of sobriety but stated he has had \"a couple of days here and there. The patient denied any history of ETOH treatment. The patient's UDS was positive for benzos. The patient denied using benzos. The patient was given Versed in the ED at [facility]. The patient is a tobacco smoker. The patient denied any other substance use.     The patient denied any mental health history. The patient is not on any mental health medications.     The patient denied anxiety, depression, SI, wish to be dead, HI, or hallucinations. The patient reported good sleep and appetite.     The patient voiced he was not interested in quitting ETOH or seeking treatment. The patient was " giving an [facility] treatment handout. Patient is open to [facility] following. [facility] will follow and provide resources as needed.

## 2023-09-30 NOTE — PLAN OF CARE
Goal Outcome Evaluation:    Patient alert and oriented, VSS, on room air. Negative for seizures overnight. Ambulating to bathroom assist x2. Receiving Ativan Q6 and Keppra Q12. Denies pain, but reported some swelling to the back of the neck. Notified [facility] and placed a cold compress. Wife at bedside. Call light within reach.

## 2023-09-30 NOTE — SIGNIFICANT NOTE
09/30/23 1425   OTHER   Discipline physical therapist   Rehab Time/Intention   Session Not Performed patient unavailable for evaluation  (Per family in room, pt has the left the unit to smoke with assist of wife and a walker. RN reports pt is still very unsteady. PT will try again at a later date.)   Recommendation   PT - Next Appointment 10/01/23

## 2023-09-30 NOTE — PROGRESS NOTES
LOS: 3 days   Patient Care Team:  Indy Moffett APRN as PCP - General (Nurse Practitioner)  Meera Ballard, BRITTANY as Nurse Practitioner (Nurse Practitioner)    Chief Complaint:  F/up seizure, respiratory failure postextubation and medical problems listed below.    Subjective   Interval History  I reviewed the admission note, progress notes, PMH, PSH, Family hx, social history, imagings and prior records on this admission, summarized the finding in my note and formulated a transition of care plan.      Confused and slightly agitated.  On RA.  Denies dyspnea or cough.  No more seizure.    REVIEW OF SYSTEMS:   Cannot obtain due to confusion                     Physical Exam:     Vital Signs  Temp:  [98.2 °F (36.8 °C)-98.7 °F (37.1 °C)] 98.4 °F (36.9 °C)  Heart Rate:  [77-96] 90  Resp:  [18] 18  BP: (159-194)/() 194/114    Intake/Output Summary (Last 24 hours) at 9/30/2023 1053  Last data filed at 9/29/2023 2002  Gross per 24 hour   Intake 240 ml   Output 750 ml   Net -510 ml     Flowsheet Rows      Flowsheet Row First Filed Value   Admission Height --   Admission Weight 93.2 kg (205 lb 7.5 oz) Documented at 09/27/2023 1114            PPE used per hospital policy    General Appearance:   Alert, cooperative, in no acute distress   ENMT:  Mallampati score 3, moist mucous membrane   Eyes:  Pupils equal and reactive to light. EOMI   Neck:    Trachea midline. No thyromegaly.   Lungs:    Clear to auscultation,respirations regular, even and nonlabored    Heart:   Regular rhythm and normal rate, normal S1 and S2, no         murmur   Skin:   No rash or ecchymosis   Abdomen:    Obese. Soft. No tenderness. No HSM.   Neuro/psych:  Conscious, alert. Strength 5/5 in upper and lower  ext.  Appropriate mood and affect   Extremities:  No cyanosis, clubbing or edema.  Warm extremities and well-perfused          Results Review:        Results from last 7 days   Lab Units 09/28/23  0985  09/27/23  1642 09/27/23  0609 09/27/23  0444 09/27/23 0442   SODIUM mmol/L 139 145  --   --  140   SODIUM, ARTERIAL mmol/L  --   --  141.4   < >  --    POTASSIUM mmol/L 3.8 3.5  --   --  4.1   CHLORIDE mmol/L 108* 111*  --   --  99   CO2 mmol/L 21.8* 21.8*  --   --  5.3*   BUN mg/dL 12 13  --   --  12   CREATININE mg/dL 0.95 0.88  --   --  1.25   GLUCOSE mg/dL 124* 98  --   --  252*   GLUCOSE, ARTERIAL mg/dL  --   --  272*   < >  --    CALCIUM mg/dL 8.4* 8.2*  --   --  9.8    < > = values in this interval not displayed.     Results from last 7 days   Lab Units 09/27/23  1642 09/27/23  0842 09/27/23 0442   CK TOTAL U/L  --   --  188   HSTROP T ng/L 25* 46* 18*     Results from last 7 days   Lab Units 09/29/23  0338 09/27/23  1642 09/27/23 0442   WBC 10*3/mm3 10.52 11.33* 16.21*   HEMOGLOBIN g/dL 13.6 14.8 16.9   HEMATOCRIT % 39.3 43.6 50.9   PLATELETS 10*3/mm3 208 226 362     Results from last 7 days   Lab Units 09/27/23 0442   INR  1.17*   APTT seconds 25.0     Results from last 7 days   Lab Units 09/27/23 0442   PROBNP pg/mL <36.0                   Results from last 7 days   Lab Units 09/28/23  1343 09/27/23  1323 09/27/23  0609 09/27/23  0444   PH, ARTERIAL pH units 7.396 7.379 6.981* 6.863*   PCO2, ARTERIAL mm Hg 35.4 40.6 61.6* 32.9*   PO2 ART mm Hg 66.7* 134.3* 118.2* 96.9   O2 SATURATION ART % 93.0 99.0* 96.0 92.0*   FLOW RATE lpm  --   --   --  2   MODALITY  Adult Vent Adult Vent AC/VC Nasal Cannula         I reviewed the patient's new clinical results.        Medication Review:   enoxaparin, 40 mg, Subcutaneous, Q24H  famotidine, 20 mg, Intravenous, BID  folic acid (FOLVITE) IVPB, 1 mg, Intravenous, Daily  lisinopril, 10 mg, Oral, Q24H   And  hydroCHLOROthiazide, 12.5 mg, Oral, Q24H  levETIRAcetam, 1,000 mg, Intravenous, Q12H  LORazepam, 1 mg, Intravenous, Q6H  multivitamin with minerals, 1 tablet, Oral, Daily  nicotine, 1 patch, Transdermal, Q24H  senna-docusate sodium, 2 tablet, Oral, BID  sodium  chloride, 10 mL, Intravenous, Q12H  thiamine (B-1) IV, 200 mg, Intravenous, Q8H   Followed by  [START ON 10/2/2023] thiamine, 100 mg, Oral, Daily        sodium chloride, 100 mL/hr, Last Rate: 100 mL/hr (09/30/23 0906)        Diagnostic imaging:  I personally and independently reviewed the following images:  CXR 9/27/2023: Low lung volumes.  Increased interstitial pulmonary infiltrates on the left.    Assessment     Status epilepticus, resolved  Respiratory failure requiring mechanical ventilation, liberated off the ventilator 9/28  Lactic acidosis, probably secondary to seizure, resolved  Elevated troponin, non-STEMI type II  Snoring, apnea and frequent awakening.  Probable LISSA.  Alcohol abuse with withdrawal  Tobacco abuse    All problems new to me    Plan   Seizure meds with Keppra and Ativan.  DVT prophylaxis with Lovenox  Pepcid prophylaxis  Oxygen by NC and titrate keep SPO2 >90%  Incentive spirometry when patient is able to cooperate.  Outpatient evaluation for sleep apnea-HST ordered to be performed as outpatient in [facility]    Thank you for [facility] team taking over his care.  We will see on an as-needed basis    Gibson Sinclair MD  09/30/23  10:53 EDT            This note was dictated utilizing [employer] dictation

## 2023-09-30 NOTE — SIGNIFICANT NOTE
"Patient asked to go outside to smoke cigarette. This RN was not agreeable with that decision, I have educated patient and his wife and offered nicotine patch. Pt declined at this time stating \"I'd rather go take a hit\". Pt has now taken himself off his heart monitor and fluids and has went outside to smoke with the company of his family. Charge nurse notified. MD paged, awaiting a call back  "

## 2023-09-30 NOTE — PROGRESS NOTES
Name: Agustin Stubbs ADMIT: 2023   : 1975  PCP: BetinaIndy BRITTANY    MRN: [id number] LOS: 3 days   AGE/SEX: 47 y.o. male  ROOM: [facility]     Subjective   Subjective   Feeling okay today. Can't really be more specific, wants to go home.  No N/V/D/abd pain/F/C/NS/SOA/CP/palp/HA/vis changes/LUTS  Wife points out large subQ mass over right occiput--she was not aware of this PTA and neither was pt (though he isn't real sure). No known fall PTA. No pain.       Objective   Objective   Vital Signs  Temp:  [98.2 °F (36.8 °C)-98.7 °F (37.1 °C)] 98.2 °F (36.8 °C)  Heart Rate:  [77-96] 86  Resp:  [18] 18  BP: (159-194)/() 164/87  SpO2:  [96 %-100 %] 96 %  on   ;   Device (Oxygen Therapy): room air  Body mass index is 29.99 kg/m².  Physical Exam  Vitals and nursing note reviewed. Exam conducted with a chaperone present (Wife and family x 4).   Constitutional:       General: He is not in acute distress.     Appearance: He is ill-appearing. He is not toxic-appearing or diaphoretic.   HENT:      Head: Normocephalic.      Comments: Large subQ mass right occiput, no overlying skin changes or injury, very mildly TTP     Nose: Nose normal.      Mouth/Throat:      Mouth: Mucous membranes are moist.      Pharynx: Oropharynx is clear.   Eyes:      General: No scleral icterus.        Right eye: No discharge.         Left eye: No discharge.      Extraocular Movements: Extraocular movements intact.      Conjunctiva/sclera: Conjunctivae normal.   Cardiovascular:      Rate and Rhythm: Normal rate and regular rhythm.      Pulses: Normal pulses.   Pulmonary:      Effort: Pulmonary effort is normal. No respiratory distress.      Breath sounds: Normal breath sounds. No wheezing or rales.   Abdominal:      General: Bowel sounds are normal. There is no distension.      Palpations: Abdomen is soft.      Tenderness: There is no abdominal tenderness.   Musculoskeletal:         General: No swelling or deformity. Normal range  of motion.      Cervical back: Neck supple. No rigidity.   Skin:     General: Skin is warm and dry.      Capillary Refill: Capillary refill takes less than 2 seconds.      Coloration: Skin is not jaundiced.   Neurological:      Mental Status: He is alert. Mental status is at baseline.      Cranial Nerves: No cranial nerve deficit.      Coordination: Coordination normal.   Psychiatric:         Behavior: Behavior normal.      Comments: Flat affect, pleasant     Results Review     I reviewed the patient's new clinical results.  Results from last 7 days   Lab Units 09/29/23 0338 09/27/23 1642 09/27/23 0442   WBC 10*3/mm3 10.52 11.33* 16.21*   HEMOGLOBIN g/dL 13.6 14.8 16.9   PLATELETS 10*3/mm3 208 226 362     Results from last 7 days   Lab Units 09/28/23  0914 09/27/23 1642 09/27/23  0609 09/27/23 0444 09/27/23 0442   SODIUM mmol/L 139 145  --   --  140   SODIUM, ARTERIAL mmol/L  --   --  141.4 143.5  --    POTASSIUM mmol/L 3.8 3.5  --   --  4.1   CHLORIDE mmol/L 108* 111*  --   --  99   CO2 mmol/L 21.8* 21.8*  --   --  5.3*   BUN mg/dL 12 13  --   --  12   CREATININE mg/dL 0.95 0.88  --   --  1.25   GLUCOSE mg/dL 124* 98  --   --  252*   GLUCOSE, ARTERIAL mg/dL  --   --  272* 245*  --    EGFR mL/min/1.73 98.7 106.1  --   --  71.0     Results from last 7 days   Lab Units 09/28/23 0914 09/27/23 1642 09/27/23 0442   ALBUMIN g/dL 3.9 4.0 5.1   BILIRUBIN mg/dL 0.9 0.8 0.3   ALK PHOS U/L 54 55 117   AST (SGOT) U/L 25 29 28   ALT (SGPT) U/L 21 23 30     Results from last 7 days   Lab Units 09/28/23  0914 09/27/23 1642 09/27/23 0442   CALCIUM mg/dL 8.4* 8.2* 9.8   ALBUMIN g/dL 3.9 4.0 5.1   MAGNESIUM mg/dL  --   --  2.4     Results from last 7 days   Lab Units 09/27/23  0842 09/27/23  0609 09/27/23  0444 09/27/23 0443   LACTATE mmol/L 1.4  --   --  25.1*   LACTATE, ARTERIAL mmol/L  --  9.22* 20.26*  --      Glucose   Date/Time Value Ref Range Status   09/29/2023 1135 99 70 - 130 mg/dL Final   09/29/2023 0535 87 70  - 130 mg/dL Final   09/28/2023 2354 104 70 - 130 mg/dL Final   09/28/2023 1241 105 70 - 130 mg/dL Final   09/28/2023 0513 132 (H) 70 - 130 mg/dL Final   09/27/2023 2317 108 70 - 130 mg/dL Final   09/27/2023 1758 120 70 - 130 mg/dL Final       No radiology results for the last day    I have personally reviewed all medications:  Scheduled Medications  enoxaparin, 40 mg, Subcutaneous, Q24H  famotidine, 20 mg, Intravenous, BID  folic acid (FOLVITE) IVPB, 1 mg, Intravenous, Daily  lisinopril, 10 mg, Oral, Q24H   And  hydroCHLOROthiazide, 12.5 mg, Oral, Q24H  levETIRAcetam, 1,000 mg, Intravenous, Q12H  multivitamin with minerals, 1 tablet, Oral, Daily  nicotine, 1 patch, Transdermal, Q24H  senna-docusate sodium, 2 tablet, Oral, BID  sodium chloride, 10 mL, Intravenous, Q12H  thiamine (B-1) IV, 200 mg, Intravenous, Q8H   Followed by  [START ON 10/2/2023] thiamine, 100 mg, Oral, Daily    Infusions  sodium chloride, 100 mL/hr, Last Rate: 100 mL/hr (09/30/23 0906)    Diet  Diet: Regular/House Diet; Texture: Regular Texture (IDDSI 7); Fluid Consistency: Thin (IDDSI 0)    I have personally reviewed:  [x]  Laboratory   [x]  Microbiology   []  Radiology   [x]  EKG/Telemetry  []  Cardiology/Vascular   []  Pathology    [x]  Records       Assessment/Plan     Active Hospital Problems    Diagnosis  POA    **Status epilepticus [G40.901]  Yes    Alcohol withdrawal [F10.939]  Unknown    Hypoxia [R09.02]  Unknown    Essential hypertension [I10]  Yes      Resolved Hospital Problems   No resolved problems to display.       48yo gentleman with TBI, tobacco abuse, alcohol abuse, and HTN, who was transferred to [facility] ICU from [facility] with status epilepticus and acute resp failure. We were asked to assume care when pt came out of ICU 9/28.    Seizure d/o  TBI: Neuro followed, seizure-free on Keppra, EEG unremarkable, MRI brain pending, need to monitor in hospital for another couple days per Neuro  Suspect seizures may be due to TBI, but  alcohol abuse certainly not helping matters    Acute resp failure: extubated 9/29, stable on RA    Lactic acidosis: due to seizure, corrected quickly    Elevated Trop: no ACS, likely Type 2 NSTEMI due to seizure, consider ischemic w/u as outpt    Alcohol abuse  Alcohol withdrawal: CIWA scores now zero but were as high as 21 on 9/28, will ask Access to see now that he is extubated and out of ICU    Tobacco abuse: recommend cessation    HTN: continue Lisinopril/HCTZ, BPs quite high still, HRs in 90s, will add metoprolol and monitor    Scalp mass: will see what it looks like on MRI, seems old to me but wife not aware of it prior to this admission, she is also not aware of any recent fall      Lovenox 40 mg SC daily for DVT prophylaxis.  Full code.  Discussed with patient, spouse, and family. D/w Dr. Rosas  Anticipate discharge home with family, timing yet to be determined.      Gerard Daniels MD  [facility]  09/30/23  14:01 EDT

## 2023-09-30 NOTE — PLAN OF CARE
Goal Outcome Evaluation:      Pt A&Ox4 but forgetful at this time. Pt VSS, NS has been discontinued. He complain of neck and head discomfort, on assessment pt neck red and swollen. MD notified, no new orders at this time. Pt to have MRI done today. This RN assessed pt CIW-A at 5:21pm, he scored an 8 and receiving 1mg Ativan, will reassess at 6:21pm. Pt and family has been education on AMA and has agreed not to go back off the unit for any reason. Pt This RN will continue to monitor  Problem: Restraint, Nonviolent  Goal: Absence of Harm or Injury  Outcome: Ongoing, Progressing

## 2023-10-01 ENCOUNTER — APPOINTMENT (OUTPATIENT)
Dept: GENERAL RADIOLOGY | Facility: HOSPITAL | Age: 48
DRG: 100 | End: 2023-10-01
Payer: COMMERCIAL

## 2023-10-01 LAB — GLUCOSE BLDC GLUCOMTR-MCNC: 100 MG/DL (ref 70–130)

## 2023-10-01 PROCEDURE — 25010000002 LEVETRIRACETAM PER 10 MG: Performed by: HOSPITALIST

## 2023-10-01 PROCEDURE — 25010000002 ENOXAPARIN PER 10 MG: Performed by: INTERNAL MEDICINE

## 2023-10-01 PROCEDURE — 71045 X-RAY EXAM CHEST 1 VIEW: CPT

## 2023-10-01 PROCEDURE — 25010000002 PHENOBARBITAL PER 120 MG: Performed by: NURSE PRACTITIONER

## 2023-10-01 PROCEDURE — 82948 REAGENT STRIP/BLOOD GLUCOSE: CPT

## 2023-10-01 PROCEDURE — 25010000002 THIAMINE HCL 200 MG/2ML SOLUTION: Performed by: INTERNAL MEDICINE

## 2023-10-01 PROCEDURE — 25010000002 LORAZEPAM PER 2 MG: Performed by: INTERNAL MEDICINE

## 2023-10-01 RX ORDER — LEVETIRACETAM 500 MG/5ML
1000 INJECTION, SOLUTION, CONCENTRATE INTRAVENOUS EVERY 12 HOURS SCHEDULED
Status: DISCONTINUED | OUTPATIENT
Start: 2023-10-01 | End: 2023-10-06 | Stop reason: HOSPADM

## 2023-10-01 RX ORDER — LEVETIRACETAM 500 MG/1
1000 TABLET ORAL EVERY 12 HOURS SCHEDULED
Status: DISCONTINUED | OUTPATIENT
Start: 2023-10-01 | End: 2023-10-06 | Stop reason: HOSPADM

## 2023-10-01 RX ORDER — LEVETIRACETAM 500 MG/1
1000 TABLET ORAL EVERY 12 HOURS SCHEDULED
Status: DISCONTINUED | OUTPATIENT
Start: 2023-10-01 | End: 2023-10-01

## 2023-10-01 RX ADMIN — LEVETIRACETAM 1000 MG: 100 INJECTION INTRAVENOUS at 10:30

## 2023-10-01 RX ADMIN — THIAMINE HYDROCHLORIDE 200 MG: 100 INJECTION, SOLUTION INTRAMUSCULAR; INTRAVENOUS at 04:50

## 2023-10-01 RX ADMIN — LORAZEPAM 2 MG: 2 INJECTION INTRAMUSCULAR; INTRAVENOUS at 07:04

## 2023-10-01 RX ADMIN — Medication 10 ML: at 21:04

## 2023-10-01 RX ADMIN — LORAZEPAM 2 MG: 2 INJECTION INTRAMUSCULAR; INTRAVENOUS at 13:29

## 2023-10-01 RX ADMIN — LORAZEPAM 2 MG: 2 INJECTION INTRAMUSCULAR; INTRAVENOUS at 00:07

## 2023-10-01 RX ADMIN — LEVETIRACETAM 1000 MG: 100 INJECTION INTRAVENOUS at 21:03

## 2023-10-01 RX ADMIN — ENOXAPARIN SODIUM 40 MG: 100 INJECTION SUBCUTANEOUS at 14:19

## 2023-10-01 RX ADMIN — SENNOSIDES AND DOCUSATE SODIUM 2 TABLET: 50; 8.6 TABLET ORAL at 21:03

## 2023-10-01 RX ADMIN — LORAZEPAM 4 MG: 2 INJECTION INTRAMUSCULAR; INTRAVENOUS at 00:42

## 2023-10-01 RX ADMIN — THIAMINE HYDROCHLORIDE 200 MG: 100 INJECTION, SOLUTION INTRAMUSCULAR; INTRAVENOUS at 21:03

## 2023-10-01 RX ADMIN — LORAZEPAM 2 MG: 2 INJECTION INTRAMUSCULAR; INTRAVENOUS at 14:34

## 2023-10-01 RX ADMIN — LORAZEPAM 2 MG: 2 INJECTION INTRAMUSCULAR; INTRAVENOUS at 14:06

## 2023-10-01 RX ADMIN — LORAZEPAM 2 MG: 2 INJECTION INTRAMUSCULAR; INTRAVENOUS at 00:25

## 2023-10-01 RX ADMIN — NICOTINE 1 PATCH: 21 PATCH, EXTENDED RELEASE TRANSDERMAL at 14:19

## 2023-10-01 RX ADMIN — LORAZEPAM 2 MG: 2 INJECTION INTRAMUSCULAR; INTRAVENOUS at 12:27

## 2023-10-01 RX ADMIN — LORAZEPAM 4 MG: 2 INJECTION INTRAMUSCULAR; INTRAVENOUS at 12:42

## 2023-10-01 RX ADMIN — PHENOBARBITAL SODIUM 65 MG: 65 INJECTION INTRAMUSCULAR at 16:44

## 2023-10-01 RX ADMIN — THIAMINE HYDROCHLORIDE 200 MG: 100 INJECTION, SOLUTION INTRAMUSCULAR; INTRAVENOUS at 15:17

## 2023-10-01 RX ADMIN — PHENOBARBITAL SODIUM 150.8 MG: 65 INJECTION INTRAMUSCULAR at 04:50

## 2023-10-01 NOTE — SIGNIFICANT NOTE
10/01/23 1155   OTHER   Discipline physical therapist   Rehab Time/Intention   Session Not Performed other (see comments)  (Per RN pt asleep and not appropriate for PT this date. PT will follow up with pt next service date for eval as appropriate.)   Recommendation   PT - Next Appointment 10/02/23

## 2023-10-01 NOTE — PLAN OF CARE
Goal Outcome Evaluation:                 Patient -placed back into soft wrist restraints and medicated with PRN medications because of continued restlessness and attempts to pull off equipment and IV access

## 2023-10-01 NOTE — NURSING NOTE
Access Center follow-up d/t ETOH.     Patient sleeping and patient;s RN requested to let patient sleep. The patient's RN reported he has been drowsy today. Patient became combative with family and staff last night requiring violent restraints. Patient has a  at bedside but is currently out of restraints. Last CIWA 3. Patient's CIWA high throughout the night scoring between 9-20. Patient received multiple doses of Ativan. When seen yesterday the patient denied wanting ETOH treatment. Access following.

## 2023-10-01 NOTE — PROGRESS NOTES
Dedicated to Hospital Care    [phone]   LOS: 4 days     Name: Agustin Stubbs  Age/Sex: 47 y.o. male  :  1975        PCP: Indy Moffett APRN  No chief complaint on file.     Subjective   He had a really rough night last night became agitated and delirious and required violent restraints.  He was given multiple doses of medication this morning is pretty somnolent.  Currently unable to take p.o. and at times somewhat distressed with coughing.  Unable to assess review of systems    enoxaparin, 40 mg, Subcutaneous, Q24H  folic acid (FOLVITE) IVPB, 1 mg, Intravenous, Daily  lisinopril, 10 mg, Oral, Q24H   And  hydroCHLOROthiazide, 12.5 mg, Oral, Q24H  levETIRAcetam, 1,000 mg, Oral, Q12H   Or  levETIRAcetam, 1,000 mg, Intravenous, Q12H  metoprolol succinate XL, 25 mg, Oral, Q24H  multivitamin with minerals, 1 tablet, Oral, Daily  nicotine, 1 patch, Transdermal, Q24H  PHENobarbital, 65 mg, Intravenous, Once   Followed by  [START ON 10/2/2023] PHENobarbital, 65 mg, Intravenous, Once   Followed by  [START ON 10/2/2023] PHENobarbital, 32.4 mg, Oral, Once   Followed by  [START ON 10/3/2023] PHENobarbital, 32.4 mg, Oral, Once   Followed by  [START ON 10/3/2023] PHENobarbital, 32.4 mg, Oral, Once  PHENobarbital, 2 mg/kg (Ideal), Intravenous, Once  senna-docusate sodium, 2 tablet, Oral, BID  sodium chloride, 10 mL, Intravenous, Q12H  thiamine (B-1) IV, 200 mg, Intravenous, Q8H   Followed by  [START ON 10/2/2023] thiamine, 100 mg, Oral, Daily           Objective   Vital Signs  Temp:  [97.3 °F (36.3 °C)-98.6 °F (37 °C)] 97.5 °F (36.4 °C)  Heart Rate:  [] 82  Resp:  [16-22] 16  BP: (141-179)/() 141/85  Body mass index is 29.53 kg/m².    Intake/Output Summary (Last 24 hours) at 10/1/2023 1328  Last data filed at 10/1/2023 0600  Gross per 24 hour   Intake 120 ml   Output 850 ml   Net -730 ml       Physical Exam  Vitals and nursing note reviewed.   Constitutional:       General: He is not in acute  distress.     Appearance: He is obese. He is ill-appearing.   Cardiovascular:      Rate and Rhythm: Normal rate and regular rhythm.   Pulmonary:      Effort: No respiratory distress.      Breath sounds: Normal breath sounds.   Abdominal:      General: Bowel sounds are normal. There is no distension.      Palpations: Abdomen is soft.   Neurological:      Mental Status: He is alert.      Comments: Lethargic and minimally responsive         Results Review:       I reviewed the patient's new clinical results.  Results from last 7 days   Lab Units 09/29/23  0338 09/27/23  1642 09/27/23 0442   WBC 10*3/mm3 10.52 11.33* 16.21*   HEMOGLOBIN g/dL 13.6 14.8 16.9   PLATELETS 10*3/mm3 208 226 362     Results from last 7 days   Lab Units 09/28/23  0914 09/27/23  1642 09/27/23  0609 09/27/23 0444 09/27/23 0442   SODIUM mmol/L 139 145  --   --  140   SODIUM, ARTERIAL mmol/L  --   --  141.4 143.5  --    POTASSIUM mmol/L 3.8 3.5  --   --  4.1   CHLORIDE mmol/L 108* 111*  --   --  99   CO2 mmol/L 21.8* 21.8*  --   --  5.3*   BUN mg/dL 12 13  --   --  12   CREATININE mg/dL 0.95 0.88  --   --  1.25   CALCIUM mg/dL 8.4* 8.2*  --   --  9.8   MAGNESIUM mg/dL  --   --   --   --  2.4   Estimated Creatinine Clearance: 112.4 mL/min (by C-G formula based on SCr of 0.95 mg/dL).      Assessment & Plan   Active Hospital Problems    Diagnosis  POA    **Status epilepticus [G40.901]  Yes    Alcohol withdrawal [F10.939]  Unknown    Hypoxia [R09.02]  Unknown    Essential hypertension [I10]  Yes      Resolved Hospital Problems   No resolved problems to display.       PLAN  46yo gentleman with TBI, tobacco abuse, alcohol abuse, and HTN, who was transferred to [facility] ICU from [facility] with status epilepticus and acute resp failure. We were asked to assume care when pt came out of ICU 9/28.   -Change his Keppra dosing to IV since he is not able to take p.o. presently.  -Continue phenobarbital protocol for his alcohol withdrawal.  Continue to  monitor CIWA scores  -Renal function electrolytes stable  -He has this lump on the back of his head.  It looks to be soft tissue on the MRI.  Its not commented on by the radiologist.  -Lovenox for DVT prophylaxis  -Full code      Disposition  Expected discharge date/ time has not been documented.       Sheng Hyatt MD  [facility]  10/01/23  13:28 EDT

## 2023-10-01 NOTE — PLAN OF CARE
Goal Outcome Evaluation:    Patient alert and oriented x2-3. Patient became combative with family and staff this evening. [facility], house, and security notified and patient placed in 4 point restraints. Patient on CIWA protocol and was given Ativan Q15 min and three runs of phenobarbital. Patient restraints discontinued at 0144. Patient asleep and snoring but sounds congested. Suction set up and notified [facility] for a chest XRAY.  at bedside. Increased rounding.

## 2023-10-01 NOTE — SIGNIFICANT NOTE
Patient physically aggressive with staff, and family, and pulling out IV lines. Security called, and patient was placed in four point restraints.  He remains agitated and aggressive, he is in no acute distress, he has not signs of injury.         Electronically signed by BRITTANY West, 09/30/23, 22:45 PM EDT.

## 2023-10-02 LAB
BACTERIA SPEC AEROBE CULT: NORMAL
BACTERIA SPEC AEROBE CULT: NORMAL

## 2023-10-02 PROCEDURE — 25010000002 PHENOBARBITAL PER 120 MG: Performed by: NURSE PRACTITIONER

## 2023-10-02 PROCEDURE — 25010000002 ENOXAPARIN PER 10 MG: Performed by: INTERNAL MEDICINE

## 2023-10-02 PROCEDURE — 99233 SBSQ HOSP IP/OBS HIGH 50: CPT | Performed by: PHYSICIAN ASSISTANT

## 2023-10-02 PROCEDURE — 25010000002 LEVETRIRACETAM PER 10 MG: Performed by: HOSPITALIST

## 2023-10-02 PROCEDURE — 25010000002 THIAMINE HCL 200 MG/2ML SOLUTION: Performed by: INTERNAL MEDICINE

## 2023-10-02 PROCEDURE — 25010000002 LORAZEPAM PER 2 MG: Performed by: INTERNAL MEDICINE

## 2023-10-02 RX ADMIN — LORAZEPAM 1 MG: 2 INJECTION INTRAMUSCULAR; INTRAVENOUS at 19:01

## 2023-10-02 RX ADMIN — Medication 100 MG: at 18:08

## 2023-10-02 RX ADMIN — THIAMINE HYDROCHLORIDE 200 MG: 100 INJECTION, SOLUTION INTRAMUSCULAR; INTRAVENOUS at 08:43

## 2023-10-02 RX ADMIN — METOPROLOL SUCCINATE 25 MG: 25 TABLET, EXTENDED RELEASE ORAL at 18:10

## 2023-10-02 RX ADMIN — LEVETIRACETAM 1000 MG: 100 INJECTION INTRAVENOUS at 22:40

## 2023-10-02 RX ADMIN — NICOTINE 1 PATCH: 21 PATCH, EXTENDED RELEASE TRANSDERMAL at 22:40

## 2023-10-02 RX ADMIN — Medication 10 ML: at 22:41

## 2023-10-02 RX ADMIN — FOLIC ACID 1 MG: 5 INJECTION, SOLUTION INTRAMUSCULAR; INTRAVENOUS; SUBCUTANEOUS at 08:43

## 2023-10-02 RX ADMIN — PHENOBARBITAL 32.4 MG: 32.4 TABLET ORAL at 18:08

## 2023-10-02 RX ADMIN — ENOXAPARIN SODIUM 40 MG: 100 INJECTION SUBCUTANEOUS at 18:08

## 2023-10-02 RX ADMIN — LEVETIRACETAM 1000 MG: 100 INJECTION INTRAVENOUS at 08:55

## 2023-10-02 RX ADMIN — PHENOBARBITAL SODIUM 65 MG: 65 INJECTION INTRAMUSCULAR at 06:02

## 2023-10-02 NOTE — NURSING NOTE
Access Center follow-up regarding alcohol use.  Patient MONISHA DIL at bedside he gave permission for her presence; however, he is very groggy-kept eyes closed and did not respond to other questions.  When asked about alcohol use treatment, he started singing the word alcohol; therefore, unable to discuss treatment desire and options.      Access [facility].

## 2023-10-02 NOTE — PLAN OF CARE
Problem: Restraint, Nonviolent  Goal: Absence of Harm or Injury  Intervention: Implement Least Restrictive Safety Strategies  Recent Flowsheet Documentation  Taken 10/2/2023 0400 by Nusrat Johansen RN  Medical Device Protection: IV pole/bag removed from visual field  Less Restrictive Alternative:   emotional support provided   bed alarm in use   self-care activities encouraged  De-Escalation Techniques: quiet time facilitated  Diversional Activities: television  Taken 10/2/2023 0206 by Nusrat Johansen RN  Medical Device Protection: IV pole/bag removed from visual field  Taken 10/2/2023 0200 by Nusrat Johansen RN  Medical Device Protection: IV pole/bag removed from visual field  Less Restrictive Alternative: emotional support provided  Diversional Activities: television  Taken 10/2/2023 0035 by Nusrat Johansen RN  Diversional Activities: television  Taken 10/2/2023 0000 by Nusrat Johansen RN  Medical Device Protection: IV pole/bag removed from visual field  Less Restrictive Alternative:   emotional support provided   calming techniques promoted  De-Escalation Techniques: quiet time facilitated  Diversional Activities: television  Taken 10/1/2023 2208 by Nusrat Johansen RN  Medical Device Protection: IV pole/bag removed from visual field  Taken 10/1/2023 2200 by Nusrat Johansen RN  Medical Device Protection: IV pole/bag removed from visual field  Less Restrictive Alternative:   emotional support provided   calming techniques promoted  De-Escalation Techniques: physical activity promoted  Diversional Activities: television  Taken 10/1/2023 2025 by Nusrat Johansen RN  Diversional Activities: television  Taken 10/1/2023 2000 by Nusrat Johansen RN  Medical Device Protection: IV pole/bag removed from visual field  Less Restrictive Alternative:   environment adjusted   emotional support provided   calming techniques promoted   bed alarm in use  De-Escalation Techniques:   appropriate behavior  reinforced   family involvement requested   diversional activity encouraged   increased round frequency   reoriented  Diversional Activities: television  Intervention: Protect Dignity, Rights, and Personal Wellbeing  Recent Flowsheet Documentation  Taken 10/2/2023 0035 by Nusrat Johansen RN  Trust Relationship/Rapport:   care explained   choices provided   emotional support provided   questions answered   questions encouraged  Taken 10/1/2023 2025 by Nusrat Johansen RN  Trust Relationship/Rapport:   care explained   choices provided   emotional support provided   questions answered   questions encouraged  Intervention: Protect Skin and Joint Integrity  Recent Flowsheet Documentation  Taken 10/2/2023 0206 by Nusrat Johansen RN  Body Position: position changed independently  Taken 10/2/2023 0005 by Nusrat Johansen RN  Body Position: position changed independently  Taken 10/1/2023 2208 by Nusrat Johansen RN  Body Position: supine, legs elevated  Taken 10/1/2023 2006 by Nusrat Johansen RN  Body Position: position changed independently   Goal Outcome Evaluation:  Plan of Care Reviewed With: patient        Progress: improving

## 2023-10-02 NOTE — PROGRESS NOTES
DOS: 10/2/2023  NAME: Agustin Stubbs   : 1975  PCP: Indy Moffett APRN  No chief complaint on file.      Chief complaint: Seizures  Subjective: Wife and dtr in law at bedside.  He is somnolent and has not awaken to eat breakfast.  Family describing episodes of agitation, taking out Ivs and not staying in bed.  No prior h/o seizures    Objective:  Vital signs: /90 (BP Location: Right arm, Patient Position: Lying)   Pulse 100   Temp 98.6 °F (37 °C) (Oral)   Resp 18   Wt 91.4 kg (201 lb 8 oz)   SpO2 94%   BMI 28.12 kg/m²      Gen: NAD, vitals reviewed  MS: somnolent, snoring, non verbal, not FC  CN: visual acuity grossly normal, PERRL, EOMI, no facial droop, no dysarthria  Motor: spontaneous mvtms seen, WD LE  Sensory: localizes throughout  Reflexes: trace, down toes    ROS:  No weakness, numbness  No fevers, chills      Laboratory results:  Lab Results   Component Value Date    GLUCOSE 124 (H) 2023    CALCIUM 8.4 (L) 2023     2023    K 3.8 2023    CO2 21.8 (L) 2023     (H) 2023    BUN 12 2023    CREATININE 0.95 2023    BCR 12.6 2023    ANIONGAP 9.2 2023     Lab Results   Component Value Date    WBC 10.52 2023    HGB 13.6 2023    HCT 39.3 2023    MCV 96.1 2023     2023     Lab Results   Component Value Date     (H) 2023     (H) 2022    LDL 96 2021            Review of labs:     Review and interpretation of imaging: MRI brain w and without   FINDINGS:  Multiplanar images of the head were obtained without and with  gadolinium. No areas of restricted diffusion are seen to suggest acute  infarct. There is atrophy, perhaps advanced for the age of 47. Areas of  encephalomalacia are noted within the frontal lobes bilaterally, left  greater than right. There is some mild periventricular microangiopathic  change. Intracranial flow voids appear intact. No  abnormalities seen on  susceptibility weighted imaging. There is no evidence of abnormal  enhancement or venous occlusion on postcontrast imaging. Please note,  postcontrast imaging is significantly degraded by motion artifact.  Mucous retention cysts are suspected within the maxillary sinuses  bilaterally. There is some mucosal thickening within the right sphenoid,  ethmoid, and frontal sinuses. Trace bilateral mastoid effusions are  noted.     IMPRESSION:  1. No acute intracranial abnormality.   No abnormal enhancement.    Workup to date: EEG   Date of onset: 9/28/2023 at 0006  Date of offset: 9/28/2023 at 0836     Indication: 47 year old man with seizure like activity.  Prolonged continuous video EEG for further evaluation.  Patient is intubated.      Technical description:  This is a 21 channel digital EEG recording that began on 9/28/2023 at 0006 and ended on 9/28/2023 at 0836.  Electrodes are placed based on the 10-20 international electrode placement system.  Simultaneous video acquisition utilized.      Background:  The EEG recording demonstrates moderate diffuse background slowing with mainly theta and delta frequencies.  No PDR is noted.  The patient is intubated and some sleep spindles are noted.  Hyperventilation and photic stimulation were not performed.  There are no asymmetries between the two hemispheres.  No interictal activity is present.     The EKG monitor shows a heart rate is around 60 beats per minute.     Clinical interpretation:  This prolonged 8 hours and 30 minutes continuous video EEG is abnormal due to the presence of moderate diffuse background slowing.  The results of this study are nonspecific, but may indicate a moderate encephalopathy or medication effect.  No potentially epileptogenic activity, seizure activity, or focal slowing is present.  Careful clinical correlation is advised.          Date of onset: 9/28/2023  Date of offset: 9/29/2023     Indication: Monitoring for  seizures     Technical description:  This is a 21 channel digital EEG recording that began on 0908 and ended on 0900 the next day.    Background:  The background consists of a posteriorly dominant rhythm that is notably absent.  Anteriorly, there is diffuse delta and theta activity.  There is fair spontaneous variability.     Hyperventilation and photic stimulation were not performed.  There is no focal slowing.  There are no interictal epileptiform discharges and no electrographic seizures noted during the study.  The study is technically limited by the presence of Fp2, F8, F7 artifact in various portions during the study.  Near the end of the study there is also left temporal chain and right temporal chain artifact which limits interpretation at those regions.  EKG demonstrates normal rate.     Impression:  This is an abnormal study due to the presence of diffuse delta and theta slowing.  There are no interictal epileptiform discharges and no electrographic seizures.  These electrophysiologic findings are indicative of moderately severe encephalopathy.             Diagnoses:  1 Status epilepticus  2 h/o TBI  3 h/o ETOH use    Impression: 48 yo RH M with h/o TBI about 7 yrs ago, daily etoh abuser transferred from OSH with concern for SE, intubated and in ICU for further mgtm.  Other things included troponemia and WD.   He was initiated on keppra and continuous EEG monitoring was negative for ictal d/c or seizure.  MRI completed and no acute finding, there is not unexpected b/l frontal encephalomalacia.  His improvement has been complicated by ongoing WD and delirium.  Hopeful he will show more recovery when able to ease on acute WD treatment    Plan:  1. Continue LEV 1000/1000  2. Delirium precautions discussed with family at bedside  3. Probably needs derm outpt for occiput skin lesion  4  Asking for [facility] paperwork    Seizure Precautions: Patient is not to drive for at least 3 months until cleared by a physician,  operate heavy machinery, take tub baths, swim unattended, climb heights, or perform any other activities that can bring harm to himself/herself or others during an episode of altered awareness.    We will be following along with you      Thank you for this consultation.  Discussed above plan with neuro attending, Dr. Murdock who agrees with above plan.  Neurology team is available for concerns or questions.

## 2023-10-02 NOTE — PROGRESS NOTES
Dedicated to Hospital Care    [phone]   LOS: 5 days     Name: Agustin Stubbs  Age/Sex: 47 y.o. male  :  1975        PCP: Indy Moffett APRN  No chief complaint on file.     Subjective   Remeians pretty sleepy but did wake up earlier and eat   Unable to assess review of systems    enoxaparin, 40 mg, Subcutaneous, Q24H  folic acid (FOLVITE) IVPB, 1 mg, Intravenous, Daily  lisinopril, 10 mg, Oral, Q24H   And  hydroCHLOROthiazide, 12.5 mg, Oral, Q24H  levETIRAcetam, 1,000 mg, Oral, Q12H   Or  levETIRAcetam, 1,000 mg, Intravenous, Q12H  metoprolol succinate XL, 25 mg, Oral, Q24H  multivitamin with minerals, 1 tablet, Oral, Daily  nicotine, 1 patch, Transdermal, Q24H  PHENobarbital, 32.4 mg, Oral, Once   Followed by  [START ON 10/3/2023] PHENobarbital, 32.4 mg, Oral, Once   Followed by  [START ON 10/3/2023] PHENobarbital, 32.4 mg, Oral, Once  PHENobarbital, 2 mg/kg (Ideal), Intravenous, Once  senna-docusate sodium, 2 tablet, Oral, BID  sodium chloride, 10 mL, Intravenous, Q12H  thiamine, 100 mg, Oral, Daily           Objective   Vital Signs  Temp:  [97.9 °F (36.6 °C)-99.5 °F (37.5 °C)] 98.6 °F (37 °C)  Heart Rate:  [] 100  Resp:  [16-18] 18  BP: (123-150)/(88-97) 150/90  Body mass index is 28.12 kg/m².  No intake or output data in the 24 hours ending 10/02/23 1250      Physical Exam  Vitals and nursing note reviewed.   Constitutional:       General: He is not in acute distress.     Appearance: He is obese. He is ill-appearing.   Cardiovascular:      Rate and Rhythm: Normal rate and regular rhythm.   Pulmonary:      Effort: No respiratory distress.      Breath sounds: Normal breath sounds.   Abdominal:      General: Bowel sounds are normal. There is no distension.      Palpations: Abdomen is soft.   Neurological:      Mental Status: He is alert.      Comments: Lethargic and minimally responsive         Results Review:       I reviewed the patient's new clinical results.  Results from last 7 days    Lab Units 09/29/23  0338 09/27/23  1642 09/27/23  0442   WBC 10*3/mm3 10.52 11.33* 16.21*   HEMOGLOBIN g/dL 13.6 14.8 16.9   PLATELETS 10*3/mm3 208 226 362       Results from last 7 days   Lab Units 09/28/23  0914 09/27/23  1642 09/27/23  0609 09/27/23  0444 09/27/23 0442   SODIUM mmol/L 139 145  --   --  140   SODIUM, ARTERIAL mmol/L  --   --  141.4 143.5  --    POTASSIUM mmol/L 3.8 3.5  --   --  4.1   CHLORIDE mmol/L 108* 111*  --   --  99   CO2 mmol/L 21.8* 21.8*  --   --  5.3*   BUN mg/dL 12 13  --   --  12   CREATININE mg/dL 0.95 0.88  --   --  1.25   CALCIUM mg/dL 8.4* 8.2*  --   --  9.8   MAGNESIUM mg/dL  --   --   --   --  2.4     Estimated Creatinine Clearance: 109.9 mL/min (by C-G formula based on SCr of 0.95 mg/dL).      Assessment & Plan   Active Hospital Problems    Diagnosis  POA    **Status epilepticus [G40.901]  Yes    Alcohol withdrawal [F10.939]  Unknown    Hypoxia [R09.02]  Unknown    Essential hypertension [I10]  Yes      Resolved Hospital Problems   No resolved problems to display.       PLAN  48yo gentleman with TBI, tobacco abuse, alcohol abuse, and HTN, who was transferred to [facility] ICU from [facility] with status epilepticus and acute resp failure. We were asked to assume care when pt came out of ICU 9/28.   -continue keppra.  -Continue phenobarbital protocol for his alcohol withdrawal.  Continue to monitor CIWA scores  -Renal function electrolytes stable  -He has this lump on the back of his head.  It looks to be soft tissue on the MRI.  Its not commented on by the radiologist.  -Lovenox for DVT prophylaxis  -Full code    FMLA paperwork returned  Disposition  Expected Discharge Date: 10/5/2023; Expected Discharge Time:        Sheng Hyatt MD  [facility]  10/02/23  12:50 EDT

## 2023-10-03 LAB
ALBUMIN SERPL-MCNC: 3.9 G/DL (ref 3.5–5.2)
ALBUMIN/GLOB SERPL: 1.3 G/DL
ALP SERPL-CCNC: 65 U/L (ref 39–117)
ALT SERPL W P-5'-P-CCNC: 42 U/L (ref 1–41)
AMMONIA BLD-SCNC: 40 UMOL/L (ref 16–60)
ANION GAP SERPL CALCULATED.3IONS-SCNC: 11.2 MMOL/L (ref 5–15)
ARTERIAL PATENCY WRIST A: POSITIVE
AST SERPL-CCNC: 39 U/L (ref 1–40)
ATMOSPHERIC PRESS: 749 MMHG
ATMOSPHERIC PRESS: 749.5 MMHG
ATMOSPHERIC PRESS: 749.6 MMHG
BASE EXCESS BLDA CALC-SCNC: -1.1 MMOL/L (ref 0–2)
BASE EXCESS BLDA CALC-SCNC: -2.5 MMOL/L (ref 0–2)
BASE EXCESS BLDA CALC-SCNC: 1.8 MMOL/L (ref 0–2)
BDY SITE: ABNORMAL
BILIRUB SERPL-MCNC: 0.7 MG/DL (ref 0–1.2)
BUN SERPL-MCNC: 16 MG/DL (ref 6–20)
BUN/CREAT SERPL: 16.2 (ref 7–25)
CALCIUM SPEC-SCNC: 9.6 MG/DL (ref 8.6–10.5)
CHLORIDE SERPL-SCNC: 107 MMOL/L (ref 98–107)
CO2 BLDA-SCNC: 22.8 MMOL/L (ref 23–27)
CO2 BLDA-SCNC: 25.2 MMOL/L (ref 23–27)
CO2 BLDA-SCNC: 27.6 MMOL/L (ref 23–27)
CO2 SERPL-SCNC: 23.8 MMOL/L (ref 22–29)
CREAT SERPL-MCNC: 0.99 MG/DL (ref 0.76–1.27)
DEVICE COMMENT: ABNORMAL
EGFRCR SERPLBLD CKD-EPI 2021: 94 ML/MIN/1.73
GLOBULIN UR ELPH-MCNC: 2.9 GM/DL
GLUCOSE SERPL-MCNC: 102 MG/DL (ref 65–99)
HCO3 BLDA-SCNC: 21.7 MMOL/L (ref 22–28)
HCO3 BLDA-SCNC: 24 MMOL/L (ref 22–28)
HCO3 BLDA-SCNC: 26.4 MMOL/L (ref 22–28)
HEMODILUTION: NO
INHALED O2 CONCENTRATION: 21 %
INHALED O2 CONCENTRATION: 21 %
INHALED O2 CONCENTRATION: 40 %
MODALITY: ABNORMAL
O2 A-A PPRESDIFF RESPIRATORY: 0.5 MMHG
O2 A-A PPRESDIFF RESPIRATORY: 0.6 MMHG
O2 A-A PPRESDIFF RESPIRATORY: 0.6 MMHG
PCO2 BLDA: 35.4 MM HG (ref 35–45)
PCO2 BLDA: 40.4 MM HG (ref 35–45)
PCO2 BLDA: 40.6 MM HG (ref 35–45)
PEEP RESPIRATORY: 5 CM[H2O]
PEEP RESPIRATORY: 5 CM[H2O]
PH BLDA: 7.38 PH UNITS (ref 7.35–7.45)
PH BLDA: 7.4 PH UNITS (ref 7.35–7.45)
PH BLDA: 7.42 PH UNITS (ref 7.35–7.45)
PO2 BLDA: 134.3 MM HG (ref 80–100)
PO2 BLDA: 66.1 MM HG (ref 80–100)
PO2 BLDA: 66.7 MM HG (ref 80–100)
POTASSIUM SERPL-SCNC: 3.7 MMOL/L (ref 3.5–5.2)
PROT SERPL-MCNC: 6.8 G/DL (ref 6–8.5)
PSV: 10 CMH2O
SAO2 % BLDCOA: 93 % (ref 92–98.5)
SAO2 % BLDCOA: 93.1 % (ref 92–98.5)
SAO2 % BLDCOA: 99 % (ref 92–98.5)
SET MECH RESP RATE: 20
SODIUM SERPL-SCNC: 142 MMOL/L (ref 136–145)
TOTAL RATE: 17 BREATHS/MINUTE
TOTAL RATE: 18 BREATHS/MINUTE
TOTAL RATE: 20 BREATHS/MINUTE
VENTILATOR MODE: ABNORMAL
VENTILATOR MODE: ABNORMAL
VT ON VENT VENT: 510 ML

## 2023-10-03 PROCEDURE — 97530 THERAPEUTIC ACTIVITIES: CPT

## 2023-10-03 PROCEDURE — 25010000002 OLANZAPINE 10 MG RECONSTITUTED SOLUTION: Performed by: HOSPITALIST

## 2023-10-03 PROCEDURE — 25010000002 ENOXAPARIN PER 10 MG: Performed by: INTERNAL MEDICINE

## 2023-10-03 PROCEDURE — 25010000002 LORAZEPAM PER 2 MG: Performed by: INTERNAL MEDICINE

## 2023-10-03 PROCEDURE — 97162 PT EVAL MOD COMPLEX 30 MIN: CPT

## 2023-10-03 PROCEDURE — 97166 OT EVAL MOD COMPLEX 45 MIN: CPT

## 2023-10-03 PROCEDURE — 36600 WITHDRAWAL OF ARTERIAL BLOOD: CPT

## 2023-10-03 PROCEDURE — 80053 COMPREHEN METABOLIC PANEL: CPT | Performed by: HOSPITALIST

## 2023-10-03 PROCEDURE — 82803 BLOOD GASES ANY COMBINATION: CPT

## 2023-10-03 PROCEDURE — 99233 SBSQ HOSP IP/OBS HIGH 50: CPT | Performed by: PHYSICIAN ASSISTANT

## 2023-10-03 PROCEDURE — 82140 ASSAY OF AMMONIA: CPT | Performed by: PSYCHIATRY & NEUROLOGY

## 2023-10-03 RX ORDER — OLANZAPINE 10 MG/1
5 INJECTION, POWDER, LYOPHILIZED, FOR SOLUTION INTRAMUSCULAR ONCE
Status: COMPLETED | OUTPATIENT
Start: 2023-10-03 | End: 2023-10-03

## 2023-10-03 RX ORDER — OLANZAPINE 5 MG/1
5 TABLET ORAL NIGHTLY
Status: DISCONTINUED | OUTPATIENT
Start: 2023-10-03 | End: 2023-10-06 | Stop reason: HOSPADM

## 2023-10-03 RX ADMIN — LORAZEPAM 2 MG: 2 INJECTION INTRAMUSCULAR; INTRAVENOUS at 11:38

## 2023-10-03 RX ADMIN — Medication 10 ML: at 20:45

## 2023-10-03 RX ADMIN — PHENOBARBITAL 32.4 MG: 32.4 TABLET ORAL at 06:41

## 2023-10-03 RX ADMIN — NICOTINE 1 PATCH: 21 PATCH, EXTENDED RELEASE TRANSDERMAL at 20:44

## 2023-10-03 RX ADMIN — LORAZEPAM 2 MG: 2 INJECTION INTRAMUSCULAR; INTRAVENOUS at 02:21

## 2023-10-03 RX ADMIN — MULTIPLE VITAMINS W/ MINERALS TAB 1 TABLET: TAB at 08:07

## 2023-10-03 RX ADMIN — LORAZEPAM 2 MG: 2 INJECTION INTRAMUSCULAR; INTRAVENOUS at 08:50

## 2023-10-03 RX ADMIN — LORAZEPAM 2 MG: 2 INJECTION INTRAMUSCULAR; INTRAVENOUS at 08:07

## 2023-10-03 RX ADMIN — LORAZEPAM 2 MG: 2 INJECTION INTRAMUSCULAR; INTRAVENOUS at 00:37

## 2023-10-03 RX ADMIN — LORAZEPAM 2 MG: 2 INJECTION INTRAMUSCULAR; INTRAVENOUS at 17:25

## 2023-10-03 RX ADMIN — OLANZAPINE 5 MG: 5 TABLET, FILM COATED ORAL at 20:43

## 2023-10-03 RX ADMIN — Medication 100 MG: at 08:07

## 2023-10-03 RX ADMIN — Medication 10 ML: at 08:13

## 2023-10-03 RX ADMIN — LORAZEPAM 2 MG: 2 INJECTION INTRAMUSCULAR; INTRAVENOUS at 18:21

## 2023-10-03 RX ADMIN — LEVETIRACETAM 1000 MG: 500 TABLET, FILM COATED ORAL at 08:07

## 2023-10-03 RX ADMIN — PHENOBARBITAL 32.4 MG: 32.4 TABLET ORAL at 18:13

## 2023-10-03 RX ADMIN — FOLIC ACID 1 MG: 5 INJECTION, SOLUTION INTRAMUSCULAR; INTRAVENOUS; SUBCUTANEOUS at 10:08

## 2023-10-03 RX ADMIN — SENNOSIDES AND DOCUSATE SODIUM 2 TABLET: 50; 8.6 TABLET ORAL at 20:43

## 2023-10-03 RX ADMIN — METOPROLOL SUCCINATE 25 MG: 25 TABLET, EXTENDED RELEASE ORAL at 08:07

## 2023-10-03 RX ADMIN — OLANZAPINE 5 MG: 10 INJECTION, POWDER, FOR SOLUTION INTRAMUSCULAR at 12:56

## 2023-10-03 RX ADMIN — LEVETIRACETAM 1000 MG: 500 TABLET, FILM COATED ORAL at 20:43

## 2023-10-03 RX ADMIN — LORAZEPAM 2 MG: 2 INJECTION INTRAMUSCULAR; INTRAVENOUS at 20:43

## 2023-10-03 RX ADMIN — LORAZEPAM 2 MG: 2 INJECTION INTRAMUSCULAR; INTRAVENOUS at 19:49

## 2023-10-03 RX ADMIN — ENOXAPARIN SODIUM 40 MG: 100 INJECTION SUBCUTANEOUS at 12:55

## 2023-10-03 RX ADMIN — HYDROCHLOROTHIAZIDE 12.5 MG: 12.5 TABLET ORAL at 08:09

## 2023-10-03 RX ADMIN — ACETAMINOPHEN 650 MG: 325 TABLET, FILM COATED ORAL at 20:53

## 2023-10-03 RX ADMIN — LISINOPRIL 10 MG: 10 TABLET ORAL at 08:10

## 2023-10-03 NOTE — THERAPY EVALUATION
Patient Name: Agustin Stubbs  : 1975    MRN: [id number]                              Today's Date: 10/3/2023       Admit Date: 2023    Visit Dx:     ICD-10-CM ICD-9-CM   1. LISSA (obstructive sleep apnea)  G47.33 327.23   2. Hypoxia  R09.02 799.02     Patient Active Problem List   Diagnosis    Mixed hyperlipidemia    Essential hypertension    Screening for malignant neoplasm of colon    History of colonic polyps    Status epilepticus    Alcohol withdrawal    Hypoxia     Past Medical History:   Diagnosis Date    Chronic allergic rhinitis     High blood pressure     Hyperlipemia     Hypertension     No pertinent past medical history      Past Surgical History:   Procedure Laterality Date    COLONOSCOPY      COLONOSCOPY N/A 2021    Procedure: COLONOSCOPY with possible biopies.;  Surgeon: Aaron Perla MD;  Location: [facility] ENDOSCOPY;  Service: General;  Laterality: N/A;  COLON POLYPS      General Information       Row Name 10/03/23 1235          OT Time and Intention    Document Type evaluation  -JW     Mode of Treatment occupational therapy;co-treatment  -JW       Row Name 10/03/23 1235          General Information    Patient Profile Reviewed yes  -JW     Prior Level of Function independent:;ADL's;all household mobility  indep with ADLs and fxl mobility w/o AD  -JW     Existing Precautions/Restrictions fall  -JW     Barriers to Rehab cognitive status  -JW       Row Name 10/03/23 1235          Living Environment    People in Home significant other  -JW       Row Name 10/03/23 1235          Cognition    Orientation Status (Cognition) oriented to;person;verbal cues/prompts needed for orientation  confused, easily distracted  -JW       Row Name 10/03/23 1235          Safety Issues, Functional Mobility    Safety Issues Affecting Function (Mobility) awareness of need for assistance;impulsivity;insight into deficits/self-awareness;judgment;problem-solving;safety precautions follow-through/compliance   -     Impairments Affecting Function (Mobility) balance;cognition;endurance/activity tolerance  -     Cognitive Impairments, Mobility Safety/Performance insight into deficits/self-awareness;awareness, need for assistance;impulsivity;judgment;problem-solving/reasoning;safety precaution follow-through  -               User Key  (r) = Recorded By, (t) = Taken By, (c) = Cosigned By      Initials Name Provider Type     Delia Palacios OT Occupational Therapist                     Mobility/ADL's       Row Name 10/03/23 [facility]          Bed Mobility    Bed Mobility supine-sit;sit-supine  -     Supine-Sit [place] (Bed Mobility) moderate assist (50% patient effort);verbal cues;nonverbal cues (demo/gesture)  -     Sit-Supine [place] (Bed Mobility) moderate assist (50% patient effort);verbal cues;nonverbal cues (demo/gesture)  -     Bed Mobility, Safety Issues cognitive deficits limit understanding  -     Assistive Device (Bed Mobility) bed rails;head of bed elevated  -     Comment, (Bed Mobility) cues to initiate movement  -       Row Name 10/03/23 [facility]          Transfers    Transfers sit-stand transfer  -       Row Name 10/03/23 [facility]          Sit-Stand Transfer    Sit-Stand [place] (Transfers) minimum assist (75% patient effort);2 person assist;verbal cues  -     Comment, (Sit-Stand Transfer) HHA from EOB  -       Row Name 10/03/23 [facility]          Functional Mobility    Functional Mobility- Ind. Level minimum assist (75% patient effort);moderate assist (50% patient effort);2 person assist required  -     Functional Mobility- Comment Min-ModAx2 for balance, wide TEMI and very unsteady at times.  -       Row Name 10/03/23 [facility]          Activities of Daily Living    BADL Assessment/Intervention lower body dressing  -       Row Name 10/03/23 [facility]          Lower Body Dressing Assessment/Training    Mad[place] Level (Lower Body Dressing) don;socks;maximum assist (25% patient effort)  -      Position (Lower Body Dressing) edge of bed sitting  -     Comment, (Lower Body Dressing) poor coordination and fxl reach for ADLs  -               User Key  (r) = Recorded By, (t) = Taken By, (c) = Cosigned By      Initials Name Provider Type    Delia Rodriguez OT Occupational Therapist                   Obj/Interventions       Row Name 10/03/23 1241          Sensory Assessment (Somatosensory)    Sensory Assessment (Somatosensory) unable/difficult to assess  -       Row Name 10/03/23 1241          Vision Assessment/Intervention    Visual Impairment/Limitations unable/difficult to assess  -       Row Name 10/03/23 1241          Range of Motion Comprehensive    Comment, General Range of Motion UEs WFL  -       Row Name 10/03/23 1241          Strength Comprehensive (MMT)    Comment, General Manual Muscle Testing (MMT) Assessment strength appears to be WFL.  -       Row Name 10/03/23 1241          Motor Skills    Motor Skills coordination  -     Coordination gross motor deficit;bilateral;moderate impairment  -[facility] Name 10/03/23 1241          Balance    Balance Assessment sitting static balance;sitting dynamic balance;standing static balance;standing dynamic balance  -     Static Sitting Balance contact guard  -     Dynamic Sitting Balance contact guard;minimal assist  -     Position, Sitting Balance sitting edge of bed  -     Static Standing Balance minimal assist  -     Dynamic Standing Balance minimal assist;moderate assist;2-person assist  -     Position/Device Used, Standing Balance supported  -     Comment, Balance very unsteady, dec safety with fxl reaching for ADLs  -               User Key  (r) = Recorded By, (t) = Taken By, (c) = Cosigned By      Initials Name Provider Type    Delia Rodriguez OT Occupational Therapist                   Goals/Plan       Row Name 10/03/23 1251          Transfer Goal 1 (OT)    Activity/Assistive Device (Transfer Goal 1, OT) transfers, all  -      Rock Level/Cues Needed (Transfer Goal 1, OT) minimum assist (75% or more patient effort)  -JW     Time Frame (Transfer Goal 1, OT) short term goal (STG);2 weeks  -     Strategies/Barriers (Transfers Goal 1, OT) using AD as needed  -JW     Progress/Outcome (Transfer Goal 1, OT) goal ongoing  -       Row Name 10/03/23 1251          Dressing Goal 1 (OT)    Activity/Device (Dressing Goal 1, OT) dressing skills, all  -JW     Rock/Cues Needed (Dressing Goal 1, OT) minimum assist (75% or more patient effort)  -JW     Time Frame (Dressing Goal 1, OT) short term goal (STG);2 weeks  -JW     Progress/Outcome (Dressing Goal 1, OT) goal ongoing  -       Row Name 10/03/23 1251          Toileting Goal 1 (OT)    Activity/Device (Toileting Goal 1, OT) toileting skills, all  -JW     Rock Level/Cues Needed (Toileting Goal 1, OT) minimum assist (75% or more patient effort)  -JW     Time Frame (Toileting Goal 1, OT) short term goal (STG);2 weeks  -JW     Progress/Outcome (Toileting Goal 1, OT) goal ongoing  -       Row Name 10/03/23 1251          Grooming Goal 1 (OT)    Activity/Device (Grooming Goal 1, OT) grooming skills, all  -     Rock (Grooming Goal 1, OT) standby assist;set-up required  -     Time Frame (Grooming Goal 1, OT) short term goal (STG);2 weeks  -     Strategies/Barriers (Grooming Goal 1, OT) EOB vs at the sink  -     Progress/Outcome (Grooming Goal 1, OT) goal ongoing  -       Row Name 10/03/23 1251          Therapy Assessment/Plan (OT)    Planned Therapy Interventions (OT) activity tolerance training;BADL retraining;adaptive equipment training;functional balance retraining;occupation/activity based interventions;patient/caregiver education/training;transfer/mobility retraining;ROM/therapeutic exercise  -               User Key  (r) = Recorded By, (t) = Taken By, (c) = Cosigned By      Initials Name Provider Type    JW Delia Palacios, OT Occupational Therapist                    Clinical Impression       Row Name 10/03/23 1242          Pain Assessment    Pretreatment Pain Rating 0/10 - no pain  -     Posttreatment Pain Rating 0/10 - no pain  -       Row Name 10/03/23 1242          Plan of Care Review    Plan of Care Reviewed With patient  -     Outcome Evaluation Pt is a 46 y/o M admitted 9/27 with seizures, resp failure requiring intubation now on room air. Now status epilepticus, possible etoh withdrawal. Hx of TBI and etoh abuse. He lives with his wife and is indep with ADLs and fxl mobility w/o AD. He presents today confused, poor insight into deficits and poor safety. He requires ModA for bed mobility and max A to don socks d/t poor coordination and dyn sitting balance. Performs fxl ambulation into hallway with HHA, Min-ModAx2 for balance as he is very unsteady. Assisted back to supine in bed. OT will cont to follow as appropriate. Will follow-up for d/c recs  -       Row Name 10/03/23 1242          Therapy Assessment/Plan (OT)    Rehab Potential (OT) fair, will monitor progress closely  -     Criteria for Skilled Therapeutic Interventions Met (OT) yes;skilled treatment is necessary  -     Therapy Frequency (OT) 3 times/wk  -       Row Name 10/03/23 1242          Therapy Plan Review/Discharge Plan (OT)    Anticipated Discharge Disposition (OT) home with 24/7 care;skilled nursing facility  -       Row Name 10/03/23 1242          Positioning and Restraints    Pre-Treatment Position in bed  -     Post Treatment Position bed  -JW     In Bed notified nsg;fowlers;call light within reach;encouraged to call for assist;exit alarm on  -               User Key  (r) = Recorded By, (t) = Taken By, (c) = Cosigned By      Initials Name Provider Type    Delia Rodriguez, ANNA Occupational Therapist                   Outcome Measures       Row Name 10/03/23 1252          How much help from another is currently needed...    Putting on and taking off regular lower body clothing?  2  -JW     Bathing (including washing, rinsing, and drying) 2  -JW     Toileting (which includes using toilet bed pan or urinal) 2  -JW     Putting on and taking off regular upper body clothing 2  -JW     Taking care of personal grooming (such as brushing teeth) 2  -JW     Eating meals 2  -JW     AM-PAC 6 Clicks Score (OT) 12  -       Row Name 10/03/23 0751          How much help from another person do you currently need...    Turning from your back to your side while in flat bed without using bedrails? 4  -CM     Moving from lying on back to sitting on the side of a flat bed without bedrails? 3  -CM     Moving to and from a bed to a chair (including a wheelchair)? 2  -CM     Standing up from a chair using your arms (e.g., wheelchair, bedside chair)? 2  -CM     Climbing 3-5 steps with a railing? 2  -CM     To walk in hospital room? 2  -CM     AM-PAC 6 Clicks Score (PT) 15  -CM     Highest level of mobility 4 --> Transferred to chair/commode  -       Row Name 10/03/23 1252          Modified [place] Scale    Modified Skyler Scale 4 - Moderately severe disability.  Unable to walk without assistance, and unable to attend to own bodily needs without assistance.  -       Row Name 10/03/23 1252          Functional Assessment    Outcome Measure Options AM-PAC 6 Clicks Daily Activity (OT);Modified Skyler  -JW               User Key  (r) = Recorded By, (t) = Taken By, (c) = Cosigned By      Initials Name Provider Type    Anna Velazquez, RN Registered Nurse    Delia Rodriguez OT Occupational Therapist                    Occupational Therapy Education       Title: PT OT SLP Therapies (In Progress)       Topic: Occupational Therapy (In Progress)       Point: ADL training (In Progress)       Description:   Instruct learner(s) on proper safety adaptation and remediation techniques during self care or transfers.   Instruct in proper use of assistive devices.                  Learning Progress Summary             Patient  Acceptance, E, NR,NL by SCOUT at 10/3/2023 1253    Comment: role of OT, plan of care, safety                         Point: Home exercise program (Not Started)       Description:   Instruct learner(s) on appropriate technique for monitoring, assisting and/or progressing therapeutic exercises/activities.                  Learner Progress:  Not documented in this visit.              Point: Precautions (In Progress)       Description:   Instruct learner(s) on prescribed precautions during self-care and functional transfers.                  Learning Progress Summary             Patient Acceptance, E, NR,NL by SCOUT at 10/3/2023 1253    Comment: role of OT, plan of care, safety                         Point: Body mechanics (In Progress)       Description:   Instruct learner(s) on proper positioning and spine alignment during self-care, functional mobility activities and/or exercises.                  Learning Progress Summary             Patient Acceptance, E, NR,NL by SCOUT at 10/3/2023 1253    Comment: role of OT, plan of care, safety                                         User Key       Initials Effective Dates Name Provider Type Discipline     06/10/21 -  Delia Palacios OT Occupational Therapist OT                  OT Recommendation and Plan  Planned Therapy Interventions (OT): activity tolerance training, BADL retraining, adaptive equipment training, functional balance retraining, occupation/activity based interventions, patient/caregiver education/training, transfer/mobility retraining, ROM/therapeutic exercise  Therapy Frequency (OT): 3 times/wk  Plan of Care Review  Plan of Care Reviewed With: patient  Outcome Evaluation: Pt is a 46 y/o M admitted 9/27 with seizures, resp failure requiring intubation now on room air. Now status epilepticus, possible etoh withdrawal. Hx of TBI and etoh abuse. He lives with his wife and is indep with ADLs and fxl mobility w/o AD. He presents today confused, poor insight into deficits and  poor safety. He requires ModA for bed mobility and max A to don socks d/t poor coordination and dyn sitting balance. Performs fxl ambulation into hallway with HHA, Min-ModAx2 for balance as he is very unsteady. Assisted back to supine in bed. OT will cont to follow as appropriate. Will follow-up for d/c recs     Time Calculation:   Evaluation Complexity (OT)  Review Occupational Profile/Medical/Therapy History Complexity: expanded/moderate complexity  Assessment, Occupational Performance/Identification of Deficit Complexity: 3-5 performance deficits  Clinical Decision Making Complexity (OT): detailed assessment/moderate complexity  Overall Complexity of Evaluation (OT): moderate complexity     Time Calculation- OT       Row Name 10/03/23 1254             Time Calculation- OT    OT Start Time 1109  -      OT Stop Time 1123  -      OT Time Calculation (min) 14 min  -JW      Total Timed Code Minutes- OT 8 minute(s)  -JW      OT Received On 10/03/23  -      OT - Next Appointment 10/05/23  -      OT Goal Re-Cert Due Date 10/17/23  -         Timed Charges    84671 - OT Therapeutic Activity Minutes 8  -JW         Untimed Charges    OT Eval/Re-eval Minutes 6  -JW         Total Minutes    Timed Charges Total Minutes 8  -JW      Untimed Charges Total Minutes 6  -JW       Total Minutes 14  -JW                User Key  (r) = Recorded By, (t) = Taken By, (c) = Cosigned By      Initials Name Provider Type    Delia Rodriguez OT Occupational Therapist                  Therapy Charges for Today       Code Description Service Date Service Provider Modifiers Qty    [id number]  OT THERAPEUTIC ACT EA 15 MIN 10/3/2023 Delia Palacios OT GO 1    [id number] HC OT EVAL MOD COMPLEXITY 3 10/3/2023 Delia Palacios OT GO 1                 Delia Palacios OT  10/3/2023

## 2023-10-03 NOTE — THERAPY EVALUATION
Patient Name: Agustin Stubbs  : 1975    MRN: [id number]                              Today's Date: 10/3/2023       Admit Date: 2023    Visit Dx:     ICD-10-CM ICD-9-CM   1. LISSA (obstructive sleep apnea)  G47.33 327.23   2. Hypoxia  R09.02 799.02     Patient Active Problem List   Diagnosis    Mixed hyperlipidemia    Essential hypertension    Screening for malignant neoplasm of colon    History of colonic polyps    Status epilepticus    Alcohol withdrawal    Hypoxia     Past Medical History:   Diagnosis Date    Chronic allergic rhinitis     High blood pressure     Hyperlipemia     Hypertension     No pertinent past medical history      Past Surgical History:   Procedure Laterality Date    COLONOSCOPY      COLONOSCOPY N/A 2021    Procedure: COLONOSCOPY with possible biopies.;  Surgeon: Aaron Perla MD;  Location: [facility] ENDOSCOPY;  Service: General;  Laterality: N/A;  COLON POLYPS      General Information       Row Name 10/03/23 1327          Physical Therapy Time and Intention    Document Type evaluation  -     Mode of Treatment physical therapy  -       Row Name 10/03/23 1327          General Information    Patient Profile Reviewed yes  -SM     Prior Level of Function independent:  -     Existing Precautions/Restrictions fall  -     Barriers to Rehab cognitive status  -       Row Name 10/03/23 1327          Living Environment    People in Home spouse  -       Row Name 10/03/23 1327          Stairs Within Home, Primary    Number of Stairs, Within Home, Primary five  -       Row Name 10/03/23 1327          Cognition    Orientation Status (Cognition) oriented to;person  Patient easily distracted  -       Row Name 10/03/23 1327          Safety Issues, Functional Mobility    Safety Issues Affecting Function (Mobility) ability to follow commands;at risk behavior observed;impulsivity;insight into deficits/self-awareness;judgment;problem-solving;safety precaution awareness;safety  precautions follow-through/compliance;sequencing abilities  -     Impairments Affecting Function (Mobility) balance;cognition;endurance/activity tolerance;postural/trunk control  -     Cognitive Impairments, Mobility Safety/Performance attention;awareness, need for assistance;impulsivity;insight into deficits/self-awareness;judgment;problem-solving/reasoning;sequencing abilities;safety precaution follow-through;safety precaution awareness  -               User Key  (r) = Recorded By, (t) = Taken By, (c) = Cosigned By      Initials Name Provider Type     Allyson Smith PT Physical Therapist                   Mobility       Row Name 10/03/23 1327          Bed Mobility    Bed Mobility supine-sit;sit-supine  -     Supine-Sit [place] (Bed Mobility) moderate assist (50% patient effort);verbal cues;nonverbal cues (demo/gesture)  -     Sit-Supine [place] (Bed Mobility) moderate assist (50% patient effort);verbal cues;nonverbal cues (demo/gesture)  -     Assistive Device (Bed Mobility) bed rails;head of bed elevated  -       Row Name 10/03/23 1327          Sit-Stand Transfer    Sit-Stand [place] (Transfers) minimum assist (75% patient effort);2 person assist;verbal cues  -     Assistive Device (Sit-Stand Transfers) other (see comments)  HHA  -       Row Name 10/03/23 1327          Gait/Stairs (Locomotion)    [place] Level (Gait) moderate assist (50% patient effort);2 person assist;verbal cues  -     Assistive Device (Gait) other (see comments)  HHA x2  -     Distance in Feet (Gait) 60ft  -SM     Deviations/Abnormal Patterns (Gait) base of support, wide;ataxic;weight shifting decreased;gait speed decreased  -     Comment, (Gait/Stairs) Patient with very unsteady ataxic gait with wide TEMI. Patient with a posterior lean throughout. Patient easily distracted requiring max cues for safety.  -               User Key  (r) = Recorded By, (t) = Taken By, (c) = Cosigned By       Initials Name Provider Type     Allyson Smith PT Physical Therapist                   Obj/Interventions       Row Name 10/03/23 1329          Range of Motion Comprehensive    General Range of Motion bilateral lower extremity ROM WFL  -       Row Name 10/03/23 1329          Strength Comprehensive (MMT)    General Manual Muscle Testing (MMT) Assessment lower extremity strength deficits identified  -     Comment, General Manual Muscle Testing (MMT) Assessment Generalized weakness. Difficult to formally assess due to cognitive status  -       Row Name 10/03/23 1329          Balance    Balance Assessment sitting static balance;sitting dynamic balance;sit to stand dynamic balance;standing static balance;standing dynamic balance  -     Static Sitting Balance contact guard  -     Dynamic Sitting Balance contact guard;minimal assist  -     Position, Sitting Balance sitting edge of bed  -     Sit to Stand Dynamic Balance minimal assist;2-person assist;verbal cues  -     Static Standing Balance minimal assist;verbal cues  -     Dynamic Standing Balance moderate assist;2-person assist;verbal cues  -     Position/Device Used, Standing Balance supported;walker, front-wheeled  -     Balance Interventions sitting;standing;sit to stand;supported;static;dynamic  -               User Key  (r) = Recorded By, (t) = Taken By, (c) = Cosigned By      Initials Name Provider Type     Allyson Smith PT Physical Therapist                   Goals/Plan       Row Name 10/03/23 6433          Bed Mobility Goal 1 (PT)    Activity/Assistive Device (Bed Mobility Goal 1, PT) bed mobility activities, all  -     Person Level/Cues Needed (Bed Mobility Goal 1, PT) silva assist  -     Time Frame (Bed Mobility Goal 1, PT) 1 week  -       Row Name 10/03/23 4768          Transfer Goal 1 (PT)    Activity/Assistive Device (Transfer Goal 1, PT) sit-to-stand/stand-to-sit;bed-to-chair/chair-to-bed  -      [place] Level/Cues Needed (Transfer Goal 1, PT) standby assist  -SM     Time Frame (Transfer Goal 1, PT) 1 week  -       Row Name 10/03/23 3556          Gait Training Goal 1 (PT)    Activity/Assistive Device (Gait Training Goal 1, PT) gait (walking locomotion)  -SM     [place] Level (Gait Training Goal 1, PT) standby assist  -SM     Distance (Gait Training Goal 1, PT) 150ft  -SM     Time Frame (Gait Training Goal 1, PT) 1 week  -               User Key  (r) = Recorded By, (t) = Taken By, (c) = Cosigned By      Initials Name Provider Type    SM Allyson Smith, PT Physical Therapist                   Clinical Impression       Row Name 10/03/23 4782          Pain    Pretreatment Pain Rating 0/10 - no pain  -SM     Posttreatment Pain Rating 0/10 - no pain  -SM       Row Name 10/03/23 5868          Plan of Care Review    Plan of Care Reviewed With patient  -     Outcome Evaluation Patient is a 47 y.o male who presented to [facility] on 9/27 with seizures and respiratory failure. Patient edtubated on 9/28. Patient AO to self only this date. Patient with pmhx of TBI, and alcohol abuse. Patient lives at home with his wife with 5 MARTA. Patient is independent at baseline and does not use use an AD. Today patient required modA to sit up to EOB. Patient performed STS from EOB with minAx2 and HHA. Patient ambulated 60ft with HHAx2 and modAx2. Patient with very unsteady ataxic gait with wide TEMI. Patient with a posterior lean throughout. Patient easily distracted requiring max cues for safety. Patient demonstrates poor insight into deficits along with decreased overall safety awareness. Patient may continue to benefit from skilled PT intervention to address deficits in functional mobility. PT d/c recomendations pending progress.  -       Row Name 10/03/23 2084          Therapy Assessment/Plan (PT)    Rehab Potential (PT) fair, will monitor progress closely  -     Criteria for Skilled Interventions Met (PT) yes  -      Therapy Frequency (PT) 5 times/wk  -       Row Name 10/03/23 1330          Vital Signs    Pre Patient Position Supine  -     Intra Patient Position Standing  -     Post Patient Position Supine  -       Row Name 10/03/23 1330          Positioning and Restraints    Pre-Treatment Position in bed  -SM     Post Treatment Position bed  -SM     In Bed encouraged to call for assist;notified nsg;exit alarm on;call light within reach;fowlers  -               User Key  (r) = Recorded By, (t) = Taken By, (c) = Cosigned By      Initials Name Provider Type    Allyson Russell, PT Physical Therapist                   Outcome Measures       Row Name 10/03/23 1338 10/03/23 0751       How much help from another person do you currently need...    Turning from your back to your side while in flat bed without using bedrails? 4  -SM 4  -CM    Moving from lying on back to sitting on the side of a flat bed without bedrails? 3  -SM 3  -CM    Moving to and from a bed to a chair (including a wheelchair)? 2  -SM 2  -CM    Standing up from a chair using your arms (e.g., wheelchair, bedside chair)? 2  -SM 2  -CM    Climbing 3-5 steps with a railing? 1  -SM 2  -CM    To walk in hospital room? 2  -SM 2  -CM    AM-PAC 6 Clicks Score (PT) 14  -SM 15  -CM    Highest level of mobility 4 --> Transferred to chair/commode  - 4 --> Transferred to chair/commode  -      Row Name 10/03/23 1252          Modified Skyler Scale    Modified Skyler Scale 4 - Moderately severe disability.  Unable to walk without assistance, and unable to attend to own bodily needs without assistance.  -       Row Name 10/03/23 1338 10/03/23 1252       Functional Assessment    Outcome Measure Options AM-PAC 6 Clicks Basic Mobility (PT)  - AM-PAC 6 Clicks Daily Activity (OT);Modified [place]  -              User Key  (r) = Recorded By, (t) = Taken By, (c) = Cosigned By      Initials Name Provider Type    Anna Velazquez, RN Registered Nurse    SCOUT  Delia Palacios, OT Occupational Therapist     Allyson Smith, EMILY Physical Therapist                                 Physical Therapy Education       Title: PT OT SLP Therapies (In Progress)       Topic: Physical Therapy (In Progress)       Point: Mobility training (In Progress)       Learning Progress Summary             Patient Acceptance, E, NR by  at 10/3/2023 1338                         Point: Home exercise program (In Progress)       Learning Progress Summary             Patient Acceptance, E, NR by  at 10/3/2023 1338                         Point: Body mechanics (In Progress)       Learning Progress Summary             Patient Acceptance, E, NR by  at 10/3/2023 1338                         Point: Precautions (In Progress)       Learning Progress Summary             Patient Acceptance, E, NR by  at 10/3/2023 1338                                         User Key       Initials Effective Dates Name Provider Type Discipline     05/02/22 -  Allyson Smith PT Physical Therapist PT                  PT Recommendation and Plan     Plan of Care Reviewed With: patient  Outcome Evaluation: Patient is a 47 y.o male who presented to [facility] on 9/27 with seizures and respiratory failure. Patient edtubated on 9/28. Patient AO to self only this date. Patient with pmhx of TBI, and alcohol abuse. Patient lives at home with his wife with 5 MARTA. Patient is independent at baseline and does not use use an AD. Today patient required modA to sit up to EOB. Patient performed STS from EOB with minAx2 and HHA. Patient ambulated 60ft with HHAx2 and modAx2. Patient with very unsteady ataxic gait with wide TEMI. Patient with a posterior lean throughout. Patient easily distracted requiring max cues for safety. Patient demonstrates poor insight into deficits along with decreased overall safety awareness. Patient may continue to benefit from skilled PT intervention to address deficits in functional mobility. PT d/c recomendations  pending progress.     Time Calculation:         PT Charges       Row Name 10/03/23 1339             Time Calculation    Start Time 1110  -SM      Stop Time 1124  -SM      Time Calculation (min) 14 min  -SM      PT Received On 10/03/23  -      PT - Next Appointment 10/04/23  -      PT Goal Re-Cert Due Date 10/10/23  -SM         Time Calculation- PT    Total Timed Code Minutes- PT 8 minute(s)  -SM         Timed Charges    70570 - PT Therapeutic Activity Minutes 8  -SM         Total Minutes    Timed Charges Total Minutes 8  -SM       Total Minutes 8  -SM                User Key  (r) = Recorded By, (t) = Taken By, (c) = Cosigned By      Initials Name Provider Type     Allyson Smith, PT Physical Therapist                  Therapy Charges for Today       Code Description Service Date Service Provider Modifiers Qty    81684355724 HC PT THERAPEUTIC ACT EA 15 MIN 10/3/2023 Allyson Smith, PT  1    08974287158 HC PT EVAL MOD COMPLEXITY 3 10/3/2023 Allyson Smith, PT  1            PT G-Codes  Outcome Measure Options: AM-PAC 6 Clicks Basic Mobility (PT)  AM-PAC 6 Clicks Score (PT): 14  AM-PAC 6 Clicks Score (OT): 12  Modified Gilchrist Scale: 4 - Moderately severe disability.  Unable to walk without assistance, and unable to attend to own bodily needs without assistance.  PT Discharge Summary  Anticipated Discharge Disposition (PT): home with 24/7 care, home with home health, skilled nursing facility    Allyson Smith PT  10/3/2023

## 2023-10-03 NOTE — DISCHARGE PLACEMENT REQUEST
"Hossein Jackson (47 y.o. Male)       Date of Birth   1975    Social Security Number       Address   [address] [place] [address]    Home Phone   [phone]    MRN   [id number]       [place]   None    Marital Status                               Admission Date   9/27/23    Admission Type   Urgent    Admitting Provider   Lillie Lindquist MD    Attending Provider   Lillie Lindquist MD    Department, Room/Bed   [address], [address]/1       Discharge Date       Discharge Disposition       Discharge Destination                                 Attending Provider: Lillie Lindquist MD    Allergies: Penicillins    Isolation: None   Infection: None   Code Status: CPR    Ht: 180.3 cm (70.98\")   Wt: 92.6 kg (204 lb 2.3 oz)    Admission Cmt: None   Principal Problem: Status epilepticus [G40.901]                   Active Insurance as of 9/27/2023       Primary Coverage       Payor Plan Insurance Group Employer/Plan Group    MISC MULTI PLAN MISC MULTIPLAN - [id number]       Coverage Address Coverage Phone Number Coverage Fax Number Effective Dates    [address] [phone]  1/1/2023 - None Entered    [facility] [id number]         Subscriber Name Subscriber Birth Date Member ID       HOSSEIN JACKSON 1975 [id number]                     Emergency Contacts        (Rel.) Home Phone Work Phone Mobile Phone    MAGI HUGGINS (Significant Other) [phone] -- [phone]                "

## 2023-10-03 NOTE — PROGRESS NOTES
DOS: 10/3/2023  NAME: Agustin Stubbs   : 1975  PCP: Indy Moffett APRN  No chief complaint on file.      Chief complaint: seizures  Subjective: Patient sleeping received Ativan and phenobarbital this morning.  Wife and friend at bedside patient.  She says he ate breakfast.  He participated in PT and ate well yesterday.  Still with episodes of agitation and trying to get out of bed    Objective:  Vital signs: BP (!) 127/101 (BP Location: Right arm, Patient Position: Lying)   Pulse 107   Temp 98.4 °F (36.9 °C) (Oral)   Resp 18   Wt 92.6 kg (204 lb 2.3 oz)   SpO2 94%   BMI 28.49 kg/m²      Gen: NAD, vitals reviewed  MS: Some participatory, follows simple commands, requires restimulation, memory impaired  language intact, no neglect.  CN: visual acuity grossly normal, PERRL, EOMI, no nystagmus no disconjugate gaze, no facial droop, no dysarthria  Motor: Symmetric power throughout, normal tone, no abnormal movements  sensory: intact to light touch all 4 ext.  Trace: down toes    ROS:  No weakness, numbness  No fevers, chills      Laboratory results:  Lab Results   Component Value Date    GLUCOSE 124 (H) 2023    CALCIUM 8.4 (L) 2023     2023    K 3.8 2023    CO2 21.8 (L) 2023     (H) 2023    BUN 12 2023    CREATININE 0.95 2023    BCR 12.6 2023    ANIONGAP 9.2 2023     Lab Results   Component Value Date    WBC 10.52 2023    HGB 13.6 2023    HCT 39.3 2023    MCV 96.1 2023     2023     Lab Results   Component Value Date     (H) 2023     (H) 2022    LDL 96 2021            Review of labs: Glucose 100, no labs from today.  CBC on  with WBCs 10,000, RBCs 4, hemoglobin 13.6, platelets 208, CMP on  sodium 139, chloride 108, CO2 21, potassium 38,, calcium 8.4, BUN 12, creatinine 1.98    Review and interpretation of imaging: MRI brain with and without notes  encephalomalacia bilateral frontal lobes left more than right    Workup to date:  Date of onset: 9/28/2023 at 0006  Date of offset: 9/28/2023 at 0836     Indication: 47 year old man with seizure like activity.  Prolonged continuous video EEG for further evaluation.  Patient is intubated.      Technical description:  This is a 21 channel digital EEG recording that began on 9/28/2023 at 0006 and ended on 9/28/2023 at 0836.  Electrodes are placed based on the 10-20 international electrode placement system.  Simultaneous video acquisition utilized.      Background:  The EEG recording demonstrates moderate diffuse background slowing with mainly theta and delta frequencies.  No PDR is noted.  The patient is intubated and some sleep spindles are noted.  Hyperventilation and photic stimulation were not performed.  There are no asymmetries between the two hemispheres.  No interictal activity is present.     The EKG monitor shows a heart rate is around 60 beats per minute.     Clinical interpretation:  This prolonged 8 hours and 30 minutes continuous video EEG is abnormal due to the presence of moderate diffuse background slowing.  The results of this study are nonspecific, but may indicate a moderate encephalopathy or medication effect.  No potentially epileptogenic activity, seizure activity, or focal slowing is present.  Careful clinical correlation is advised.           Date of onset: 9/28/2023  Date of offset: 9/29/2023     Indication: Monitoring for seizures     Technical description:  This is a 21 channel digital EEG recording that began on 0908 and ended on 0900 the next day.    Background:  The background consists of a posteriorly dominant rhythm that is notably absent.  Anteriorly, there is diffuse delta and theta activity.  There is fair spontaneous variability.     Hyperventilation and photic stimulation were not performed.  There is no focal slowing.  There are no interictal epileptiform discharges and no  electrographic seizures noted during the study.  The study is technically limited by the presence of Fp2, F8, F7 artifact in various portions during the study.  Near the end of the study there is also left temporal chain and right temporal chain artifact which limits interpretation at those regions.  EKG demonstrates normal rate.     Impression:  This is an abnormal study due to the presence of diffuse delta and theta slowing.  There are no interictal epileptiform discharges and no electrographic seizures.  These electrophysiologic findings are indicative of moderately severe encephalopathy.          Diagnoses:  1 Status epilepticus  2 h/o TBI  3 h/o ETOH use     Impression: 46 yo RH M with h/o TBI about 7 yrs ago, daily etoh abuser transferred from OSH with concern for SE, intubated and in ICU for further mgtm.  Other things included troponemia and WD. He was initiated on keppra and continuous EEG monitoring was negative for ictal d/c or seizure.  MRI completed and no acute finding, there is not unexpected b/l frontal encephalomalacia.  His improvement has been complicated by ongoing WD and delirium. Hopeful he will show more recovery when able to ease on acute WD treatment     He is more alert and some precipitating today. Worked with PT and eating meals, but still agitated michell in night and requiring PRNs for attempts to pull out Ivs and get out of bed unassisted.  Maybe scheduled night time seroquel or zyprexa will help but HR/BP elevated.  I have encouraged family to remain bedside as able, help with redirection.  He has neurologist with whom he used to follow and wife going to get an appointment.  Agree with psychiatry for help with behavioral component      Neuro team available as needed      Thank you for this consultation.  Discussed above plan with neuro attending, Dr. Murdock who agrees with above plan.  Neurology team is available for concerns or questions.

## 2023-10-03 NOTE — PLAN OF CARE
Goal Outcome Evaluation:  Plan of Care Reviewed With: patient           Outcome Evaluation: Patient is a 47 y.o male who presented to [facility] on 9/27 with seizures and respiratory failure. Patient edtubated on 9/28. Patient AO to self only this date. Patient with pmhx of TBI, and alcohol abuse. Patient lives at home with his wife with 5 MARTA. Patient is independent at baseline and does not use use an AD. Today patient required modA to sit up to EOB. Patient performed STS from EOB with minAx2 and HHA. Patient ambulated 60ft with HHAx2 and modAx2. Patient with very unsteady ataxic gait with wide TEMI. Patient with a posterior lean throughout. Patient easily distracted requiring max cues for safety. Patient demonstrates poor insight into deficits along with decreased overall safety awareness. Patient may continue to benefit from skilled PT intervention to address deficits in functional mobility. PT d/c recomendations pending progress.      Anticipated Discharge Disposition (PT): home with 24/7 care, home with home health, skilled nursing facility

## 2023-10-03 NOTE — PROGRESS NOTES
[facility]    [phone]   LOS: 6 days     Name: Agustin Stubbs  Age/Sex: 47 y.o. male  :  1975        PCP: Indy Moffett APRN  No chief complaint on file.     Subjective   He is pretty agitated this afterenoon and despite multiple IV ativan pushes he is pulling out the IVs and trying to get out of bed. per nursing has been very difficult to redirect and keep in the bed and he remains very unsteady when up.    Unable to assess review of systems    enoxaparin, 40 mg, Subcutaneous, Q24H  folic acid (FOLVITE) IVPB, 1 mg, Intravenous, Daily  lisinopril, 10 mg, Oral, Q24H   And  hydroCHLOROthiazide, 12.5 mg, Oral, Q24H  levETIRAcetam, 1,000 mg, Oral, Q12H   Or  levETIRAcetam, 1,000 mg, Intravenous, Q12H  metoprolol succinate XL, 25 mg, Oral, Q24H  multivitamin with minerals, 1 tablet, Oral, Daily  nicotine, 1 patch, Transdermal, Q24H  PHENobarbital, 32.4 mg, Oral, Once  PHENobarbital, 2 mg/kg (Ideal), Intravenous, Once  senna-docusate sodium, 2 tablet, Oral, BID  sodium chloride, 10 mL, Intravenous, Q12H  thiamine, 100 mg, Oral, Daily           Objective   Vital Signs  Temp:  [97.5 °F (36.4 °C)-99 °F (37.2 °C)] 98.1 °F (36.7 °C)  Heart Rate:  [103-128] 108  Resp:  [18-20] 20  BP: (114-139)/() 114/83  Body mass index is 28.49 kg/m².    Intake/Output Summary (Last 24 hours) at 10/3/2023 1159  Last data filed at 10/2/2023 2300  Gross per 24 hour   Intake 240 ml   Output 600 ml   Net -360 ml         Physical Exam  Vitals and nursing note reviewed.   Constitutional:       General: He is not in acute distress.     Appearance: He is obese. He is ill-appearing.   Cardiovascular:      Rate and Rhythm: Normal rate and regular rhythm.   Pulmonary:      Effort: No respiratory distress.      Breath sounds: Normal breath sounds.   Abdominal:      General: Bowel sounds are normal. There is no distension.      Palpations: Abdomen is soft.   Neurological:      Mental Status: He is alert.       Comments: Lethargic and minimally responsive         Results Review:       I reviewed the patient's new clinical results.  Results from last 7 days   Lab Units 09/29/23  0338 09/27/23  1642 09/27/23 0442   WBC 10*3/mm3 10.52 11.33* 16.21*   HEMOGLOBIN g/dL 13.6 14.8 16.9   PLATELETS 10*3/mm3 208 226 362       Results from last 7 days   Lab Units 09/28/23  0914 09/27/23  1642 09/27/23  0609 09/27/23  0444 09/27/23 0442   SODIUM mmol/L 139 145  --   --  140   SODIUM, ARTERIAL mmol/L  --   --  141.4 143.5  --    POTASSIUM mmol/L 3.8 3.5  --   --  4.1   CHLORIDE mmol/L 108* 111*  --   --  99   CO2 mmol/L 21.8* 21.8*  --   --  5.3*   BUN mg/dL 12 13  --   --  12   CREATININE mg/dL 0.95 0.88  --   --  1.25   CALCIUM mg/dL 8.4* 8.2*  --   --  9.8   MAGNESIUM mg/dL  --   --   --   --  2.4     Estimated Creatinine Clearance: 110.6 mL/min (by C-G formula based on SCr of 0.95 mg/dL).      Assessment & Plan   Active Hospital Problems    Diagnosis  POA    **Status epilepticus [G40.901]  Yes    Alcohol withdrawal [F10.939]  Unknown    Hypoxia [R09.02]  Unknown    Essential hypertension [I10]  Yes      Resolved Hospital Problems   No resolved problems to display.       PLAN  48yo gentleman with TBI, tobacco abuse, alcohol abuse, and HTN, who was transferred to [facility] ICU from [facility] with status epilepticus and acute resp failure. We were asked to assume care when pt came out of ICU 9/28.   -continue keppra.  -Continue phenobarbital protocol for his alcohol withdrawal.  Continue to monitor CIWA scores; he finishes the phenobarbital today with last dose this evening.  I still question if this is ETOH withdrawal given the stability in his VS.  He only gets hypertensive and tachy when he is agitated.    -He has a pattern of delirium with times he is appropriate and other times is very difficult to redirect.  Ativan currently mainly being used from the CIWA protocol but this may not be the best treatment for him.  Will ask  psychiatry to evaluate today and weigh in on medications for impulsivity and behavior issues.     >In the meantime I'm going to give a IM dose of zyprexa for now and order a siter to try very hard to keep him out of restraints.  Will order Qhs zyprexa as well.    -Renal function electrolytes stable, CMP ordered today  -He has this lump on the back of his head.  It looks to be soft tissue on the MRI.  Its not commented on by the radiologist.  Plan outpatient referral as this isnt a pressing matter acutely  -Lovenox for DVT prophylaxis  -Full code    FMLA paperwork returned  Disposition  Expected Discharge Date: 10/5/2023; Expected Discharge Time:        Sheng Hyatt MD  [facility]  10/03/23  11:59 EDT    At this point I feel like this is the safest plan to try to keep him out of violent restraints.  He had to be placed in violent restraints over the weekend due to a paradoxical reaction to Ativan vs worsenig delirium.  We are again seeing this some today's Ativan's been pushed multiple times this morning.  As I mentioned above I do not feel like this is really alcohol withdrawal and more related to delirium secondary to his seizures and traumatic brain injury.  I do feel like have laid out the safest course of care for this gentleman.  While he had to be placed in violent restraints this was due to delirium and not due to malicious intent.  His violent behavior was all related to delirium.  I do see her concerned that he is a young healthy gentleman and can get out of control rather quickly if the delirium were to worsen that to the point for the sitter is to hopefully keep and redirected enough that we do not end up with these behavior concerns.    Labs reviewed and chemistries within normal limits with mild LFT elevation plan to follow for now.      Electronically signed by Sheng Hyatt MD, 10/03/23, 2:25 PM EDT.

## 2023-10-03 NOTE — PROGRESS NOTES
"[facility] f/u d/t ETOH. Chart reviewed, spoke w/ RN and met w/ pt. RN reports pt received \"a lot\" of ativan this morning. CIWA between 6-2 this morning. Pt A&Ox4. Brother and wife at bedside, gave permission to speak freely. Pt rates both anxiety and depression \"10/10\" today (10 being the worst). Pt denies SI/HI, hallucinations or wish to be dead. Pt not taking questioning seriously and responds either in a joking manner and laughing or begins singing \"alcohol\" to this writer. Pt reports unaware of plan for tx, resources previously given. Family reports plan to speak with pt more seriously about TWAN tx.   [facility] following.   "

## 2023-10-03 NOTE — PLAN OF CARE
Goal Outcome Evaluation:  Plan of Care Reviewed With: patient           Outcome Evaluation: Pt is a 48 y/o M admitted 9/27 with seizures, resp failure requiring intubation now on room air. Now status epilepticus, possible etoh withdrawal. Hx of TBI and etoh abuse. He lives with his wife and is indep with ADLs and fxl mobility w/o AD. He presents today confused, poor insight into deficits and poor safety. He requires ModA for bed mobility and max A to don socks d/t poor coordination and dyn sitting balance. Performs fxl ambulation into hallway with HHA, Min-ModAx2 for balance as he is very unsteady. Assisted back to supine in bed. OT will cont to follow as appropriate. Will follow-up for d/c recs

## 2023-10-04 PROBLEM — R41.0 DELIRIUM, ACUTE: Status: ACTIVE | Noted: 2023-10-04

## 2023-10-04 PROBLEM — S06.9XAA TBI (TRAUMATIC BRAIN INJURY): Status: ACTIVE | Noted: 2023-10-04

## 2023-10-04 PROCEDURE — 25010000002 ENOXAPARIN PER 10 MG: Performed by: INTERNAL MEDICINE

## 2023-10-04 PROCEDURE — 25010000002 LORAZEPAM PER 2 MG: Performed by: INTERNAL MEDICINE

## 2023-10-04 PROCEDURE — 97530 THERAPEUTIC ACTIVITIES: CPT

## 2023-10-04 PROCEDURE — 25010000002 LEVETRIRACETAM PER 10 MG: Performed by: HOSPITALIST

## 2023-10-04 RX ORDER — OLANZAPINE 10 MG/1
5 INJECTION, POWDER, LYOPHILIZED, FOR SOLUTION INTRAMUSCULAR EVERY 8 HOURS PRN
Status: DISCONTINUED | OUTPATIENT
Start: 2023-10-04 | End: 2023-10-06 | Stop reason: HOSPADM

## 2023-10-04 RX ORDER — LORAZEPAM 1 MG/1
2 TABLET ORAL EVERY 6 HOURS PRN
Status: DISCONTINUED | OUTPATIENT
Start: 2023-10-04 | End: 2023-10-06 | Stop reason: HOSPADM

## 2023-10-04 RX ADMIN — METOPROLOL SUCCINATE 25 MG: 25 TABLET, EXTENDED RELEASE ORAL at 12:39

## 2023-10-04 RX ADMIN — OLANZAPINE 5 MG: 5 TABLET, FILM COATED ORAL at 20:48

## 2023-10-04 RX ADMIN — LEVETIRACETAM 1000 MG: 100 INJECTION INTRAVENOUS at 20:47

## 2023-10-04 RX ADMIN — ENOXAPARIN SODIUM 40 MG: 100 INJECTION SUBCUTANEOUS at 12:40

## 2023-10-04 RX ADMIN — LEVETIRACETAM 1000 MG: 100 INJECTION INTRAVENOUS at 08:25

## 2023-10-04 RX ADMIN — LORAZEPAM 2 MG: 2 INJECTION INTRAMUSCULAR; INTRAVENOUS at 03:33

## 2023-10-04 RX ADMIN — FOLIC ACID 1 MG: 5 INJECTION, SOLUTION INTRAMUSCULAR; INTRAVENOUS; SUBCUTANEOUS at 09:34

## 2023-10-04 RX ADMIN — LISINOPRIL 10 MG: 10 TABLET ORAL at 12:43

## 2023-10-04 RX ADMIN — Medication 10 ML: at 20:49

## 2023-10-04 RX ADMIN — NICOTINE 1 PATCH: 21 PATCH, EXTENDED RELEASE TRANSDERMAL at 20:50

## 2023-10-04 RX ADMIN — Medication 100 MG: at 12:39

## 2023-10-04 RX ADMIN — SENNOSIDES AND DOCUSATE SODIUM 2 TABLET: 50; 8.6 TABLET ORAL at 20:48

## 2023-10-04 RX ADMIN — HYDROCHLOROTHIAZIDE 12.5 MG: 12.5 TABLET ORAL at 12:39

## 2023-10-04 RX ADMIN — Medication 10 ML: at 12:40

## 2023-10-04 RX ADMIN — MULTIPLE VITAMINS W/ MINERALS TAB 1 TABLET: TAB at 12:39

## 2023-10-04 NOTE — PROGRESS NOTES
[facility] to Hospital [facility]    [phone]   LOS: 7 days     Name: Agustin Stubbs  Age/Sex: 47 y.o. male  :  1975        PCP: Indy Moffett APRN  No chief complaint on file.     Subjective   Per nurse and sitter we had a rough night.  He received about 16 mg of IV ativan and received IM zyprexa x1 and one oral scheduled dose of zyprexa.    Unable to assess review of systems    enoxaparin, 40 mg, Subcutaneous, Q24H  folic acid (FOLVITE) IVPB, 1 mg, Intravenous, Daily  lisinopril, 10 mg, Oral, Q24H   And  hydroCHLOROthiazide, 12.5 mg, Oral, Q24H  levETIRAcetam, 1,000 mg, Oral, Q12H   Or  levETIRAcetam, 1,000 mg, Intravenous, Q12H  metoprolol succinate XL, 25 mg, Oral, Q24H  multivitamin with minerals, 1 tablet, Oral, Daily  nicotine, 1 patch, Transdermal, Q24H  OLANZapine, 5 mg, Oral, Nightly  PHENobarbital, 2 mg/kg (Ideal), Intravenous, Once  senna-docusate sodium, 2 tablet, Oral, BID  sodium chloride, 10 mL, Intravenous, Q12H  thiamine, 100 mg, Oral, Daily           Objective   Vital Signs  Temp:  [97.7 °F (36.5 °C)-98.4 °F (36.9 °C)] 97.7 °F (36.5 °C)  Heart Rate:  [] 95  Resp:  [18-20] 20  BP: (102-133)/() 102/77  Body mass index is 28.49 kg/m².    Intake/Output Summary (Last 24 hours) at 10/4/2023 0717  Last data filed at 10/3/2023 2101  Gross per 24 hour   Intake --   Output 650 ml   Net -650 ml         Physical Exam  Vitals and nursing note reviewed.   Constitutional:       General: He is not in acute distress.     Appearance: He is obese. He is ill-appearing.   Cardiovascular:      Rate and Rhythm: Normal rate and regular rhythm.   Pulmonary:      Effort: No respiratory distress.      Breath sounds: Normal breath sounds.   Abdominal:      General: Bowel sounds are normal. There is no distension.      Palpations: Abdomen is soft.   Neurological:      Mental Status: He is alert.      Comments: Lethargic and minimally responsive         Results Review:       I reviewed the patient's new  clinical results.  Results from last 7 days   Lab Units 09/29/23  0338 09/27/23  1642   WBC 10*3/mm3 10.52 11.33*   HEMOGLOBIN g/dL 13.6 14.8   PLATELETS 10*3/mm3 208 226       Results from last 7 days   Lab Units 10/03/23  1232 09/28/23  0914 09/27/23  1642   SODIUM mmol/L 142 139 145   POTASSIUM mmol/L 3.7 3.8 3.5   CHLORIDE mmol/L 107 108* 111*   CO2 mmol/L 23.8 21.8* 21.8*   BUN mg/dL 16 12 13   CREATININE mg/dL 0.99 0.95 0.88   CALCIUM mg/dL 9.6 8.4* 8.2*     Estimated Creatinine Clearance: 106.1 mL/min (by C-G formula based on SCr of 0.99 mg/dL).      Assessment & Plan   Active Hospital Problems    Diagnosis  POA    **Status epilepticus [G40.901]  Yes    TBI (traumatic brain injury) [S06.9XAA]  Unknown    Delirium, acute [R41.0]  Unknown    Alcohol withdrawal [F10.939]  Unknown    Hypoxia [R09.02]  Unknown    Essential hypertension [I10]  Yes      Resolved Hospital Problems   No resolved problems to display.       PLAN  46yo gentleman with TBI, tobacco abuse, alcohol abuse, and HTN, who was transferred to [facility] ICU from [facility] with status epilepticus and acute resp failure. We were asked to assume care when pt came out of ICU 9/28.   -continue keppra with IV or po dosing as he is able to tolerate which ever route is needed.  -Continue phenobarbital protocol for his alcohol withdrawal.  Continue to monitor CIWA scores; he completed the phenobarb yesterday and still utilizing significant ativan usage.  On the day of his initial changes concerning for etoh withdrawal he required over 20mg of ativan and ended up in violent restraints.    -He has a pattern of delirium with times he is appropriate and other times is very difficult to redirect.  Ativan currently mainly being used from the CIWA protocol but this may not be the best treatment for him.  Will ask psychiatry to evaluate today and weigh in on medications for impulsivity and behavior issues.  Im not really even 100% sure that we are treat alcohol  withdrawal at this point vs severe delirium form his seizure and prolonged hospital stay with history of TBI   >The IM zyprexa seemed to help some yesterday but the po dose last night was ineffective with him having additional behavior issues overnight.  Continue soft restraints as deemed necessary from the nursing staff but continue to assess for removal.  -there was some concern regarding WK causing his issues this am by the nursing stf but he did receive Thiamine 200mg Q8 for 5days and remains on po ativan at this time  -Renal function electrolytes stable, CMP reveiwed  -He has this lump on the back of his head.  It looks to be soft tissue on the MRI.  Its not commented on by the radiologist.  Plan outpatient referral as this isnt a pressing matter acutely  -Lovenox for DVT prophylaxis  -Full code      Disposition  Expected Discharge Date: 10/5/2023; Expected Discharge Time:        Sheng Hyatt MD  [facility]  10/04/23  07:17 EDT

## 2023-10-04 NOTE — SIGNIFICANT NOTE
10/04/23 0936   OTHER   Discipline physical therapy assistant   Rehab Time/Intention   Session Not Performed other (see comments)  (Per RN request PT will hold for today.  Pt is very confused and agitated as well as with sitter and currently in restraints. PT will follow up tomorrow.)   Recommendation   PT - Next Appointment 10/05/23

## 2023-10-04 NOTE — PLAN OF CARE
Problem: Restraint, Nonviolent  Goal: Absence of Harm or Injury  Intervention: Implement Least Restrictive Safety Strategies  Recent Flowsheet Documentation  Taken 10/4/2023 0406 by Nusrat Johansen RN  Medical Device Protection: IV pole/bag removed from visual field  Taken 10/4/2023 0400 by Nusrat Johansen RN  Medical Device Protection:   IV pole/bag removed from visual field   tubing secured  Less Restrictive Alternative:   1:1 observation maintained   appropriate expression promoted   bed alarm in use   calming techniques promoted   coping techniques promoted   emotional support provided  De-Escalation Techniques:   appropriate behavior reinforced   diversional activity encouraged   family involvement requested  Diversional Activities: television  Taken 10/4/2023 0203 by Nusrat Johansen RN  Medical Device Protection: IV pole/bag removed from visual field  Taken 10/4/2023 0200 by Nusrat Johansen RN  Medical Device Protection: IV pole/bag removed from visual field  Less Restrictive Alternative:   1:1 observation maintained   appropriate expression promoted   bed alarm in use   calming techniques promoted   coping techniques promoted   emotional support provided  De-Escalation Techniques:   1:1 observation initiated   appropriate behavior reinforced   diversional activity encouraged   family involvement requested   increased round frequency   medication administered   quiet time facilitated  Diversional Activities: television  Taken 10/4/2023 0045 by Nusrat Johansen RN  Diversional Activities: television  Taken 10/4/2023 0000 by Nusrat Johansen RN  Medical Device Protection: IV pole/bag removed from visual field  Less Restrictive Alternative:   1:1 observation maintained   appropriate expression promoted   bed alarm in use   calming techniques promoted   emotional support provided   environment adjusted  De-Escalation Techniques:   1:1 observation initiated   appropriate behavior reinforced    diversional activity encouraged   increased round frequency   quiet time facilitated  Diversional Activities: television  Taken 10/3/2023 2225 by Nusrat Johansen RN  Medical Device Protection: IV pole/bag removed from visual field  Less Restrictive Alternative:   1:1 observation maintained   appropriate expression promoted   bed alarm in use   calming techniques promoted   environment adjusted   emotional support provided   sensory stimulation limited  De-Escalation Techniques:   1:1 observation initiated   appropriate behavior reinforced   diversional activity encouraged   physical activity promoted   quiet time facilitated  Diversional Activities: television  Taken 10/3/2023 2206 by Nusrat Johansen RN  Medical Device Protection: IV pole/bag removed from visual field  Taken 10/3/2023 2020 by Nusrat Johansen RN  Diversional Activities: television  Taken 10/3/2023 2004 by Nusrat Johansen RN  Medical Device Protection: IV pole/bag removed from visual field  Intervention: Protect Dignity, Rights, and Personal Wellbeing  Recent Flowsheet Documentation  Taken 10/4/2023 0045 by Nusrat Johansen RN  Trust Relationship/Rapport:   care explained   choices provided   questions answered   questions encouraged  Taken 10/3/2023 2020 by Nusrat Johansen RN  Trust Relationship/Rapport:   care explained   choices provided   emotional support provided  Intervention: Protect Skin and Joint Integrity  Recent Flowsheet Documentation  Taken 10/4/2023 0406 by Nusrat Johansen RN  Body Position: position changed independently  Taken 10/4/2023 0203 by Nusrat Johansen RN  Body Position: position changed independently  Taken 10/4/2023 0007 by Nusrat Johansen RN  Body Position: position changed independently  Taken 10/3/2023 2206 by Nusrat Johansen RN  Body Position: position changed independently  Taken 10/3/2023 2004 by Nusrat Johansen RN  Body Position: position changed independently   Goal Outcome Evaluation:  Plan of  "Care Reviewed With: patient        Progress: declining  Pt was confused with stable vital signs and  a sitter at the bedside at the beginning of the shift. He was not cooperative with care, pulled iv site out & his heart monitor, trying to go out of the room multiple times, using \"F\" words on staff members. Security staff &  was called on him two times, medicated as ordered with no effect on him, called to the provider on call, pt was placed on bilateral soft wrist restraint with four bed rails up for safety. Family aware, he was resting well with 1:1 sitter  at this time. I will continue to monitor.            "

## 2023-10-04 NOTE — NURSING NOTE
Pt alert but confused this morning, very restless and trying to get out of the bed. Ativan given several times per CIWA protocol. Was effective only for short times. Zyprexa 5 mg IM once was ordered by dr after his assessment. Pt continued to be restless and uncooperative for almost 2 hours before Zyprexa was effective. Pt did get out of bed to bedside commode to have bowel movement with assist x3.Continued with giving Ativan IV per CIWA protocol. Consult to psych today but they have not seen yet. Sitter is at bedside which is constantly redirecting patient.

## 2023-10-04 NOTE — THERAPY TREATMENT NOTE
Patient Name: Agustin Stubbs  : 1975    MRN: [id number]                              Today's Date: 10/4/2023       Admit Date: 2023    Visit Dx:     ICD-10-CM ICD-9-CM   1. LISSA (obstructive sleep apnea)  G47.33 327.23   2. Hypoxia  R09.02 799.02     Patient Active Problem List   Diagnosis    Mixed hyperlipidemia    Essential hypertension    Screening for malignant neoplasm of colon    History of colonic polyps    Status epilepticus    Alcohol withdrawal    Hypoxia    TBI (traumatic brain injury)    Delirium, acute     Past Medical History:   Diagnosis Date    Chronic allergic rhinitis     High blood pressure     Hyperlipemia     Hypertension     No pertinent past medical history      Past Surgical History:   Procedure Laterality Date    COLONOSCOPY      COLONOSCOPY N/A 2021    Procedure: COLONOSCOPY with possible biopies.;  Surgeon: Aaron Perla MD;  Location: [facility] ENDOSCOPY;  Service: General;  Laterality: N/A;  COLON POLYPS      General Information       Row Name 10/04/23 1420          Physical Therapy Time and Intention    Document Type therapy note (daily note)  -     Mode of Treatment physical therapy  -       Row Name 10/04/23 1420          General Information    Barriers to Rehab cognitive status  -       Row Name 10/04/23 1420          Cognition    Orientation Status (Cognition) oriented to;person  -       Row Name 10/04/23 1420          Safety Issues, Functional Mobility    Impairments Affecting Function (Mobility) balance;cognition;endurance/activity tolerance;strength  -PH     Cognitive Impairments, Mobility Safety/Performance safety precaution awareness;safety precaution follow-through;judgment;impulsivity;insight into deficits/self-awareness;awareness, need for assistance  -     Comment, Safety Issues/Impairments (Mobility) gt belt and non skid socks donned  -               User Key  (r) = Recorded By, (t) = Taken By, (c) = Cosigned By      Initials Name Provider  Type    PH Alice Daniels PTA Physical Therapist Assistant                   Mobility       Row Name 10/04/23 1421          Bed Mobility    Bed Mobility supine-sit  -PH     Supine-Sit [place] (Bed Mobility) supervision  -PH     Sit-Supine [place] (Bed Mobility) supervision  -PH     Assistive Device (Bed Mobility) bed rails;head of bed elevated  -PH     Comment, (Bed Mobility) pt slow to sit up to EOB then SBA at EOB  -PH       Row Name 10/04/23 1421          Sit-Stand Transfer    Sit-Stand [place] (Transfers) standby assist  -PH     Assistive Device (Sit-Stand Transfers) other (see comments)  No AD  -PH     Comment, (Sit-Stand Transfer) pt was directed to stand but jumped up impulsively in an unanticipated manner after stating he could not put his bathrobe on while seated. Pt educ to let this PTA know when he needed to move to maintain pt safety  -PH       Row Name 10/04/23 1421          Gait/Stairs (Locomotion)    [place] Level (Gait) contact guard;2 person assist;verbal cues;nonverbal cues (demo/gesture)  -PH     Assistive Device (Gait) walker, front-wheeled  -PH     Distance in Feet (Gait) 150'  -PH     [place] Level (Stairs) not tested  -PH     Comment, (Gait/Stairs) mild unsteadiness noted at times; pt cued for avoidance of veering w/ pt veering L and R at times. Pt suddenly incr speed and tipped fww onto L side only to show how he could drive. Pt educ on keeping 4 legs touching floor for safety.  -PH               User Key  (r) = Recorded By, (t) = Taken By, (c) = Cosigned By      Initials Name Provider Type     Alice Daniels PTA Physical Therapist Assistant                   Obj/Interventions       Row Name 10/04/23 1424          Balance    Balance Assessment sitting static balance;standing static balance  -PH     Static Sitting Balance standby assist  -PH     Static Standing Balance contact guard;2-person assist  -PH     Position/Device Used, Standing Balance  walker, front-wheeled  -PH               User Key  (r) = Recorded By, (t) = Taken By, (c) = Cosigned By      Initials Name Provider Type    Alice Dobbs PTA Physical Therapist Assistant                   Goals/Plan    No documentation.                  Clinical Impression       Row Name 10/04/23 1429          Pain    Pretreatment Pain Rating 0/10 - no pain  -PH     Posttreatment Pain Rating 0/10 - no pain  -PH     Additional Documentation Pain Scale: Numbers Pre/Post-Treatment (Group)  -PH       Row Name 10/04/23 1428          Plan of Care Review    Plan of Care Reviewed With patient;spouse;family  -     Progress improving  -PH     Outcome Evaluation Pt seen by PT this date for tx. Pt was laying in fowlers playing video games when this PTA introduced herself. Pt's spouse finally had to take the cell phone w/ this PTA not able to redirect pt. Pt performed bed mobiity w/ SBA. Pt directed to stand and did so by jumping up onto his feet. Pt educ on importance of PT knowing what pt was going to do to maintain safety. Pt amb 150' req CGA x 2 and use of fww. Pt impulsively veering L and R in attempt for humor. Pt impulsivelysped up and tipped fww onto L legs as pt explained to show how he could drive. Pt educ on imp of keeping all fww legs touching floor for pt safety. Pt returned to bed after re-entering room w/ SBA. PT will prog as pt arik.  -PH       Row Name 10/04/23 1425          Vital Signs    O2 Delivery Pre Treatment room air  -PH     O2 Delivery Intra Treatment room air  -PH     O2 Delivery Post Treatment room air  -PH       Row Name 10/04/23 142          Positioning and Restraints    Pre-Treatment Position in bed  -PH     Post Treatment Position bed  -PH     In Bed fowlers;call light within reach;encouraged to call for assist;exit alarm on;notified nsg;with nsg;with family/caregiver  pt w/ sitter in room who accompanied PT in hallway  -PH               User Key  (r) = Recorded By, (t) = Taken By,  (c) = Cosigned By      Initials Name Provider Type    Alice Dobbs PTA Physical Therapist Assistant                   Outcome Measures       Row Name 10/04/23 1430          How much help from another person do you currently need...    Turning from your back to your side while in flat bed without using bedrails? 4  -PH     Moving from lying on back to sitting on the side of a flat bed without bedrails? 3  -PH     Moving to and from a bed to a chair (including a wheelchair)? 3  -PH     Standing up from a chair using your arms (e.g., wheelchair, bedside chair)? 3  -PH     Climbing 3-5 steps with a railing? 2  -PH     To walk in hospital room? 3  -PH     AM-PAC 6 Clicks Score (PT) 18  -PH     Highest level of mobility 6 --> Walked 10 steps or more  -PH       Row Name 10/04/23 1430          Functional Assessment    Outcome Measure Options AM-PAC 6 Clicks Basic Mobility (PT)  -PH               User Key  (r) = Recorded By, (t) = Taken By, (c) = Cosigned By      Initials Name Provider Type     Alice Daniels PTA Physical Therapist Assistant                                 Physical Therapy Education       Title: PT OT SLP Therapies (In Progress)       Topic: Physical Therapy (In Progress)       Point: Mobility training (In Progress)       Learning Progress Summary             Patient Acceptance, E,TB,D, NR,NL by  at 10/4/2023 1431    Acceptance, E, NR by  at 10/3/2023 1338                         Point: Home exercise program (In Progress)       Learning Progress Summary             Patient Acceptance, E, NR by  at 10/3/2023 1338                         Point: Body mechanics (In Progress)       Learning Progress Summary             Patient Acceptance, E,TB,D, NR,NL by  at 10/4/2023 1431    Acceptance, E, NR by  at 10/3/2023 1338                         Point: Precautions (In Progress)       Learning Progress Summary             Patient Acceptance, E,TB,D, NR,NL by  at 10/4/2023 1431     Acceptance, E, NR by  at 10/3/2023 1338                                         User Key       Initials Effective Dates Name Provider Type Discipline    PH 06/16/21 -  Alice Daniels PTA Physical Therapist Assistant PT     05/02/22 -  Allyson Smith PT Physical Therapist PT                  PT Recommendation and Plan     Plan of Care Reviewed With: patient, spouse, family  Progress: improving  Outcome Evaluation: Pt seen by PT this date for tx. Pt was laying in fowlers playing video games when this PTA introduced herself. Pt's spouse finally had to take the cell phone w/ this PTA not able to redirect pt. Pt performed bed mobiity w/ SBA. Pt directed to stand and did so by jumping up onto his feet. Pt educ on importance of PT knowing what pt was going to do to maintain safety. Pt amb 150' req CGA x 2 and use of fww. Pt impulsively veering L and R in attempt for humor. Pt impulsivelysped up and tipped fww onto L legs as pt explained to show how he could drive. Pt educ on imp of keeping all fww legs touching floor for pt safety. Pt returned to bed after re-entering room w/ SBA. PT will prog as pt arik.     Time Calculation:         PT Charges       Row Name 10/04/23 1431 10/04/23 0936          Time Calculation    Start Time 1404  -PH --     Stop Time 1415  -PH --     Time Calculation (min) 11 min  -PH --     PT Received On 10/04/23  -PH --     PT - Next Appointment 10/05/23  -PH 10/05/23  -PH        Timed Charges    52619 - PT Therapeutic Activity Minutes 10  -PH --        Total Minutes    Timed Charges Total Minutes 10  -PH --      Total Minutes 10  -PH --               User Key  (r) = Recorded By, (t) = Taken By, (c) = Cosigned By      Initials Name Provider Type    PH Alice Daniels PTA Physical Therapist Assistant                  Therapy Charges for Today       Code Description Service Date Service Provider Modifiers Qty    [id number]  PT THERAPEUTIC ACT EA 15 MIN 10/4/2023 Jeremiah  JOHANA Jenkins GP 1    [id number]  PT THER SUPP EA 15 MIN 10/4/2023 Alice Daniels PTA GP 1            PT G-Codes  Outcome Measure Options: AM-PAC 6 Clicks Basic Mobility (PT)  AM-PAC 6 Clicks Score (PT): 18  AM-PAC 6 Clicks Score (OT): 12  Modified Skyler Scale: 4 - Moderately severe disability.  Unable to walk without assistance, and unable to attend to own bodily needs without assistance.  PT Discharge Summary  Anticipated Discharge Disposition (PT): home with 24/7 care, home with home health (inpatient psych facility)    Alice Daniels PTA  10/4/2023     Arava Counseling:  Patient counseled regarding adverse effects of Arava including but not limited to nausea, vomiting, abnormalities in liver function tests. Patients may develop mouth sores, rash, diarrhea, and abnormalities in blood counts. The patient understands that monitoring is required including LFTs and blood counts.  There is a rare possibility of scarring of the liver and lung problems that can occur when taking methotrexate. Persistent nausea, loss of appetite, pale stools, dark urine, cough, and shortness of breath should be reported immediately. Patient advised to discontinue Arava treatment and consult with a physician prior to attempting conception. The patient will have to undergo a treatment to eliminate Arava from the body prior to conception.

## 2023-10-04 NOTE — PLAN OF CARE
Goal Outcome Evaluation:  Plan of Care Reviewed With: patient, spouse, family        Progress: improving  Outcome Evaluation: Pt seen by PT this date for tx. Pt was laying in fowlers playing video games when this PTA introduced herself. Pt's spouse finally had to take the cell phone w/ this PTA not able to redirect pt. Pt performed bed mobiity w/ SBA. Pt directed to stand and did so by jumping up onto his feet. Pt educ on importance of PT knowing what pt was going to do to maintain safety. Pt amb 150' req CGA x 2 and use of fww. Pt impulsively veering L and R in attempt for humor. Pt impulsivelysped up and tipped fww onto L legs as pt explained to show how he could drive. Pt educ on imp of keeping all fww legs touching floor for pt safety. Pt returned to bed after re-entering room w/ SBA. PT will prog as pt arik.      Anticipated Discharge Disposition (PT): home with 24/7 care, home with home health (inpatient psych facility)

## 2023-10-04 NOTE — CONSULTS
"IDENTIFYING INFORMATION: The patient is a 47-year-old white male admitted in status epilepticus.    CHIEF COMPLAINT: None given    INFORMANT: Wife, patient is sleeping soundly during attempted interview    RELIABILITY: Good    HISTORY OF PRESENT ILLNESS: The patient is a 39-year-old white male with no prior history of psychiatric treatment.  The patient's wife reports that he drinks about \"5 beers a night\".  He began to suffer seizures on 9/27/2023 and was brought to this facility.  He has since been on a detoxication protocol which features both lorazepam and phenobarbital.  It is unclear as to whether the source of his seizures was alcohol related but he has been started on Keppra.  When seen today the patient is sleeping soundly and cannot be awakened for interview.  He has required several doses of as needed Zyprexa since hospitalization.  A sitter is currently in place secondary to the patient's constant need for redirection.    PAST PSYCHIATRIC HISTORY: Other than alcohol use as noted previously there is no reported history of psychiatric treatment    PAST MEDICAL HISTORY: Significant for hypertension, allergic rhinitis, hyperlipidemia, hypertension    MEDICATIONS:   Current Facility-Administered Medications   Medication Dose Route Frequency Provider Last Rate Last Admin    acetaminophen (TYLENOL) tablet 650 mg  650 mg Oral Q4H PRN Gildardo Monroe Jr., MD   650 mg at 10/03/23 2053    Or    acetaminophen (TYLENOL) suppository 650 mg  650 mg Rectal Q4H PRN Gildardo Monroe Jr., MD        sennosides-docusate (PERICOLACE) 8.6-50 MG per tablet 2 tablet  2 tablet Oral BID Gildardo Monroe Jr., MD   2 tablet at 10/03/23 2043    And    polyethylene glycol (MIRALAX) packet 17 g  17 g Oral Daily PRN Gildardo Monroe Jr., MD        And    bisacodyl (DULCOLAX) EC tablet 5 mg  5 mg Oral Daily PRN Gildardo Monroe Jr., MD        And    bisacodyl (DULCOLAX) suppository 10 mg  10 mg Rectal Daily PRN Mabel, " Gildardo Feng Jr., MD        Calcium Replacement - Follow Nurse / BPA Driven Protocol   Does not apply PRN Gildardo Monroe Jr., MD        Enoxaparin Sodium (LOVENOX) syringe 40 mg  40 mg Subcutaneous Q24H Gildardo Monroe Jr., MD   40 mg at 10/03/23 1255    folic acid 1 mg in sodium chloride 0.9 % 50 mL IVPB  1 mg Intravenous Daily Gildardo Monroe Jr.,  mL/hr at 10/04/23 0934 1 mg at 10/04/23 0934    lisinopril (PRINIVIL,ZESTRIL) tablet 10 mg  10 mg Oral Q24H Gildardo Monroe Jr., MD   10 mg at 10/03/23 0810    And    hydroCHLOROthiazide (HYDRODIURIL) tablet 12.5 mg  12.5 mg Oral Q24H Gildardo Monroe Jr., MD   12.5 mg at 10/03/23 0809    labetalol (NORMODYNE,TRANDATE) injection 20 mg  20 mg Intravenous Q10 Min PRN Gildardo Monroe Jr., MD   20 mg at 09/29/23 0836    levETIRAcetam (KEPPRA) tablet 1,000 mg  1,000 mg Oral Q12H Sheng Hyatt MD   1,000 mg at 10/03/23 2043    Or    levETIRAcetam (KEPPRA) injection 1,000 mg  1,000 mg Intravenous Q12H Sheng Hyatt MD   1,000 mg at 10/04/23 0825    LORazepam (ATIVAN) tablet 2 mg  2 mg Oral Q6H PRN Bradley Gregory III, MD        Magnesium Standard Dose Replacement - Follow Nurse / BPA Driven Protocol   Does not apply PRN Gildardo Monroe Jr., MD        metoprolol succinate XL (TOPROL-XL) 24 hr tablet 25 mg  25 mg Oral Q24H Zelda Donohue APRN   25 mg at 10/03/23 0807    metoprolol tartrate (LOPRESSOR) injection 5 mg  5 mg Intravenous Q6H PRN Sheng Hyatt MD        multivitamin with minerals 1 tablet  1 tablet Oral Daily Gildardo Monroe Jr., MD   1 tablet at 10/03/23 0807    nicotine (NICODERM CQ) 21 MG/24HR patch 1 patch  1 patch Transdermal Q24H Gildardo Monroe Jr., MD   1 patch at 10/03/23 2044    nitroglycerin (NITROSTAT) SL tablet 0.4 mg  0.4 mg Sublingual Q5 Min PRN Gildardo Monroe Jr., MD        OLANZapine (zyPREXA) injection 5 mg  5 mg Intramuscular Q8H PRN Sheng Hyatt MD         OLANZapine (zyPREXA) tablet 5 mg  5 mg Oral Nightly Sheng Hyatt MD   5 mg at 10/03/23 2043    ondansetron (ZOFRAN) injection 4 mg  4 mg Intravenous Q6H PRN Gildardo Monroe Jr., MD        Phosphorus Replacement - Follow Nurse / BPA Driven Protocol   Does not apply PRN Gildardo Monroe Jr., MD        Potassium Replacement - Follow Nurse / BPA Driven Protocol   Does not apply PRN Gildardo Monroe Jr., MD        sodium chloride 0.9 % flush 10 mL  10 mL Intravenous Q12H Gildardo Monroe Jr., MD   10 mL at 10/03/23 2045    sodium chloride 0.9 % flush 10 mL  10 mL Intravenous PRN Gildardo Monroe Jr., MD        sodium chloride 0.9 % infusion 40 mL  40 mL Intravenous PRN Gildardo Monroe Jr., MD        thiamine (VITAMIN B-1) tablet 100 mg  100 mg Oral Daily Gildardo Monroe Jr., MD   100 mg at 10/03/23 0807         ALLERGIES: Penicillin    FAMILY HISTORY: Not obtained    SOCIAL HISTORY: Alcohol use as noted previously    MENTAL STATUS EXAM: The patient is a well-developed and nourished white male who is dressed in hospital garb.  No restraints are in place.  He is sleeping soundly and cannot be awakened for interview.    ASSETS/LIABILITIES: To be assessed    DIAGNOSTIC IMPRESSION: Alcohol use disorder, possible alcohol withdrawal seizure    PLAN: The patient is now 7 days out from his last drink.  In some cases we see that continued administration of Ativan mimics the confusional symptoms associated with delirium tremens.  Accordingly I have discontinued Ativan and will give this medication only if the patient exhibits autonomic hyperactivity consistent with benzodiazepine and/or alcohol withdrawal.  I will continue to follow with you.

## 2023-10-04 NOTE — NURSING NOTE
"Access follow-up regards ETOH:    Pt has sitter at bedside. He appears confused. He is oriented to self, year and city. Pt then stated, \"I got three words for ya...\" Pt then started singing something nonsensical. He denied craving for alcohol, but stated, \"I am craving a cigarette\". He denied feelings of depression or anxiety - \"I feel fine\". Asked pt about his appetite - \"I'm always always eating\". Sleep - \"I couldn't tell ya\".    Pt not able to discuss any [facility] resources at this time. He did state, \"I'd like to curb it.. at least until the weekend\".     Access and psychiatrist following.  "

## 2023-10-05 PROCEDURE — 25010000002 ENOXAPARIN PER 10 MG: Performed by: INTERNAL MEDICINE

## 2023-10-05 PROCEDURE — 25010000002 LEVETRIRACETAM PER 10 MG: Performed by: HOSPITALIST

## 2023-10-05 RX ADMIN — MULTIPLE VITAMINS W/ MINERALS TAB 1 TABLET: TAB at 08:33

## 2023-10-05 RX ADMIN — HYDROCHLOROTHIAZIDE 12.5 MG: 12.5 TABLET ORAL at 08:33

## 2023-10-05 RX ADMIN — OLANZAPINE 5 MG: 5 TABLET, FILM COATED ORAL at 20:28

## 2023-10-05 RX ADMIN — ENOXAPARIN SODIUM 40 MG: 100 INJECTION SUBCUTANEOUS at 13:18

## 2023-10-05 RX ADMIN — LEVETIRACETAM 1000 MG: 100 INJECTION INTRAVENOUS at 20:28

## 2023-10-05 RX ADMIN — Medication 10 ML: at 08:33

## 2023-10-05 RX ADMIN — SENNOSIDES AND DOCUSATE SODIUM 2 TABLET: 50; 8.6 TABLET ORAL at 20:28

## 2023-10-05 RX ADMIN — Medication 10 ML: at 20:28

## 2023-10-05 RX ADMIN — METOPROLOL SUCCINATE 25 MG: 25 TABLET, EXTENDED RELEASE ORAL at 08:33

## 2023-10-05 RX ADMIN — Medication 100 MG: at 08:33

## 2023-10-05 RX ADMIN — LISINOPRIL 10 MG: 10 TABLET ORAL at 08:33

## 2023-10-05 RX ADMIN — FOLIC ACID 1 MG: 5 INJECTION, SOLUTION INTRAMUSCULAR; INTRAVENOUS; SUBCUTANEOUS at 10:22

## 2023-10-05 RX ADMIN — NICOTINE 1 PATCH: 21 PATCH, EXTENDED RELEASE TRANSDERMAL at 20:31

## 2023-10-05 RX ADMIN — LEVETIRACETAM 1000 MG: 500 TABLET, FILM COATED ORAL at 08:33

## 2023-10-05 NOTE — NURSING NOTE
Access follow up regarding ETOH: safety precautions in place with sitter at bedside. Mild confusion remains present. Ativan per protocol has been d/c per psychiatrist. Progressing towards goal. Following.

## 2023-10-05 NOTE — PROGRESS NOTES
The patient is sleeping soundly today.  No restraint devices are in place and sitter reports that there have been no management issues.  Continuing current treatment.

## 2023-10-05 NOTE — PROGRESS NOTES
LOS: 8 days     Name: Agustin Stubbs  Age: 47 y.o.  Sex: male  :  1975  MRN: [id number]         Primary Care Physician: Indy Moffett APRN    Subjective   Subjective  Mentation improved yesterday afternoon.  Slept all night last night.  Has been up and eating breakfast this morning.  Wife at the bedside states that his confusion is pretty much resolved.  Nursing staff indicates that he still has some impulsivity and is not quite ready for removal of sitter.    Objective   Vital Signs  Temp:  [97.5 °F (36.4 °C)-98.6 °F (37 °C)] 98.2 °F (36.8 °C)  Heart Rate:  [] 93  Resp:  [18-20] 18  BP: (108-134)/(71-89) 133/89  Body mass index is 28.61 kg/m².    Objective:  General Appearance:  Comfortable and in no acute distress.    Vital signs: (most recent): Blood pressure 133/89, pulse 93, temperature 98.2 °F (36.8 °C), temperature source Oral, resp. rate 18, weight 93 kg (205 lb 0.4 oz), SpO2 94 %.    Lungs:  Normal effort and normal respiratory rate.  He is not in respiratory distress.  There are decreased breath sounds.    Heart: Normal rate.  Regular rhythm.    Abdomen: Abdomen is soft.  Bowel sounds are normal.   There is no abdominal tenderness.     Extremities: There is no dependent edema or local swelling.    Neurological: (Resting soundly).    Skin:  Warm and dry.              Results Review:       I reviewed the patient's new clinical results.    Results from last 7 days   Lab Units 23  0338   WBC 10*3/mm3 10.52   HEMOGLOBIN g/dL 13.6   PLATELETS 10*3/mm3 208     Results from last 7 days   Lab Units 10/03/23  1232   SODIUM mmol/L 142   POTASSIUM mmol/L 3.7   CHLORIDE mmol/L 107   CO2 mmol/L 23.8   BUN mg/dL 16   CREATININE mg/dL 0.99   CALCIUM mg/dL 9.6   GLUCOSE mg/dL 102*                 Scheduled Meds:   enoxaparin, 40 mg, Subcutaneous, Q24H  folic acid (FOLVITE) IVPB, 1 mg, Intravenous, Daily  lisinopril, 10 mg, Oral, Q24H   And  hydroCHLOROthiazide, 12.5 mg, Oral, Q24H  levETIRAcetam,  1,000 mg, Oral, Q12H   Or  levETIRAcetam, 1,000 mg, Intravenous, Q12H  metoprolol succinate XL, 25 mg, Oral, Q24H  multivitamin with minerals, 1 tablet, Oral, Daily  nicotine, 1 patch, Transdermal, Q24H  OLANZapine, 5 mg, Oral, Nightly  senna-docusate sodium, 2 tablet, Oral, BID  sodium chloride, 10 mL, Intravenous, Q12H  thiamine, 100 mg, Oral, Daily      PRN Meds:     acetaminophen **OR** acetaminophen    senna-docusate sodium **AND** polyethylene glycol **AND** bisacodyl **AND** bisacodyl    Calcium Replacement - Follow Nurse / BPA Driven Protocol    labetalol    LORazepam    Magnesium Standard Dose Replacement - Follow Nurse / BPA Driven Protocol    metoprolol tartrate    nitroglycerin    OLANZapine    ondansetron    Phosphorus Replacement - Follow Nurse / BPA Driven Protocol    Potassium Replacement - Follow Nurse / BPA Driven Protocol    sodium chloride    sodium chloride  Continuous Infusions:       Assessment & Plan   Active Hospital Problems    Diagnosis  POA    **Status epilepticus [G40.901]  Yes    TBI (traumatic brain injury) [S06.9XAA]  Unknown    Delirium, acute [R41.0]  Unknown    Alcohol withdrawal [F10.939]  Unknown    Hypoxia [R09.02]  Unknown    Essential hypertension [I10]  Yes      Resolved Hospital Problems   No resolved problems to display.       Assessment & Plan    46yo gentleman with TBI, tobacco abuse, alcohol abuse, and HTN, who was transferred to [facility] ICU from [facility] with status epilepticus and acute resp failure. We were asked to assume care when pt came out of ICU 9/28.   -continue keppra with IV or po dosing as he is able to tolerate which ever route is needed.  -Alcohol withdrawal protocol has been discontinued as this was felt to be causing ongoing delirium.  Has been started on Zyprexa.  Appreciate psychiatry input.  -Renal function electrolytes stable  -Lovenox for DVT prophylaxis  -Full code    Dispo  Anticipate discharge tomorrow if he continues to show  improvement.    Expected Discharge Date: 10/5/2023; Expected Discharge Time:      Yoshi Perdomo MD  [facility]  10/05/23  11:23 EDT

## 2023-10-05 NOTE — PLAN OF CARE
Goal Outcome Evaluation:      Pt ambulated in room and around nurses station several times with standby assist. A/ox4, RA, PIV patent and intact. Denies pain, soa, n/v/d, vss. Wife and sitter at bedside. He is cooperative, and can be a bit loud at times, but remains calm. No acute distress noted.

## 2023-10-05 NOTE — PLAN OF CARE
Goal Outcome Evaluation:           Progress: improving       Patient was confused and heavily sleeping earlier in the shift. Patient woke up around 11 and took medications. Patient ambulated with PT and ambulated outside room with wife and sitter with no issues. Patient has been more cooperative and redirectable this shift. RN did not need to administer any PRN medications for agitation. Patient became A&Ox3 later in shift.

## 2023-10-05 NOTE — SIGNIFICANT NOTE
10/05/23 3992   OTHER   Discipline physical therapy assistant   Rehab Time/Intention   Session Not Performed other (see comments)  (Per RN, pt is amb in room alone and no longer requires the services of PT. PT will s/o at this time.)        182.88

## 2023-10-05 NOTE — PLAN OF CARE
Goal Outcome Evaluation: patient was met ambulating the pavon with his wife. He still has his sitter at his bedside. There was no agitation except mild confusion, no seizure throughout the shift. Patient slept soundly. Nil fresh complain

## 2023-10-06 ENCOUNTER — READMISSION MANAGEMENT (OUTPATIENT)
Dept: CALL CENTER | Facility: HOSPITAL | Age: 48
End: 2023-10-06
Payer: COMMERCIAL

## 2023-10-06 VITALS
OXYGEN SATURATION: 96 % | DIASTOLIC BLOOD PRESSURE: 78 MMHG | SYSTOLIC BLOOD PRESSURE: 114 MMHG | BODY MASS INDEX: 28.61 KG/M2 | HEART RATE: 99 BPM | WEIGHT: 205.03 LBS | TEMPERATURE: 99.9 F | RESPIRATION RATE: 16 BRPM

## 2023-10-06 RX ORDER — LEVETIRACETAM 1000 MG/1
1000 TABLET ORAL EVERY 12 HOURS SCHEDULED
Qty: 60 TABLET | Refills: 0 | Status: SHIPPED | OUTPATIENT
Start: 2023-10-06 | End: 2023-11-05

## 2023-10-06 RX ORDER — METOPROLOL SUCCINATE 25 MG/1
25 TABLET, EXTENDED RELEASE ORAL
Qty: 30 TABLET | Refills: 0 | Status: SHIPPED | OUTPATIENT
Start: 2023-10-07 | End: 2023-11-06

## 2023-10-06 RX ADMIN — LISINOPRIL 10 MG: 10 TABLET ORAL at 08:29

## 2023-10-06 RX ADMIN — Medication 100 MG: at 08:28

## 2023-10-06 RX ADMIN — HYDROCHLOROTHIAZIDE 12.5 MG: 12.5 TABLET ORAL at 08:29

## 2023-10-06 RX ADMIN — LEVETIRACETAM 1000 MG: 500 TABLET, FILM COATED ORAL at 08:29

## 2023-10-06 RX ADMIN — MULTIPLE VITAMINS W/ MINERALS TAB 1 TABLET: TAB at 08:28

## 2023-10-06 RX ADMIN — METOPROLOL SUCCINATE 25 MG: 25 TABLET, EXTENDED RELEASE ORAL at 08:29

## 2023-10-06 RX ADMIN — Medication 10 ML: at 08:34

## 2023-10-06 NOTE — PROGRESS NOTES
The patient is remarkably improved.  He is awake alert and oriented in all spheres.  His vital signs are stable and he is fully oriented and pleasant.  I have gently but firmly confronted him regarding the events which led to hospitalization and have strongly recommended that he maintain sobriety following discharge and have suggested at very least that he consider attendance at alcoholic's Anonymous.

## 2023-10-06 NOTE — PLAN OF CARE
Goal Outcome Evaluation:  Plan of Care Reviewed With: patient has been complaint with care, no seizure. Patient still acting strangely even though he is alert and oriented X4. Sitter still present at bedside. Nil fresh complain        Progress: improving

## 2023-10-06 NOTE — PLAN OF CARE
Goal Outcome Evaluation:      A/ox4, RA, talkative and aloof, no s/s of acute distress noted. Denies soa, n/v/d, PIV and tele removed. Sitter and wife at bedside. Pt wanting to take a shower before d/c.

## 2023-10-06 NOTE — DISCHARGE SUMMARY
Date of Admission: 9/27/2023  Date of Discharge:  10/6/2023  Primary Care Physician: Indy Moffett APRN     Discharge Diagnosis:  Active Hospital Problems    Diagnosis  POA    **Status epilepticus [G40.901]  Yes    TBI (traumatic brain injury) [S06.9XAA]  Unknown    Delirium, acute [R41.0]  Unknown    Alcohol withdrawal [F10.939]  Unknown    Hypoxia [R09.02]  Unknown    Essential hypertension [I10]  Yes      Resolved Hospital Problems   No resolved problems to display.       DETAILS OF HOSPITAL STAY     Hospital Course  This is a 48yo gentleman with history of TBI, tobacco abuse, alcohol abuse, and HTN, who was transferred to [facility] ICU from [facility] with status epilepticus and acute resp failure.  Please see intensivist notes from the beginning of his hospitalization for additional details of his presentation.  He was stabilized in the ICU and eventually extubated.  Status epilepticus was brought under control, he is now on scheduled doses of Keppra and the hospital service was asked to take over his care.  Upon transition out of the ICU he developed acute encephalopathy/delirium requiring violent restraints. It was felt that this was alcohol withdrawal and was started on CIWA protocol and phenobarbital taper but unfortunately it did not improve any of his symptoms.  At that point the question was asked as to whether he was truly going through alcohol withdrawal or not.  Psychiatry was consulted and they stopped the Ativan and phenobarbital.  He was started on Zyprexa.  It was ultimately felt that these behavioral changes were delirium and encephalopathy from his underlying traumatic brain injury and the fact that he had status epilepticus with severe seizure.  With adjustment of these medications he is now significantly improved and back to his cognitive baseline.  He has been restraint free for many days.  I discussed with the patient as well as his wife who is at the bedside.  He looks medically stable  at this time, consulting services have signed off and he will be discharged back home.  The patient has followed with a neurologist in the past not associated with the [facility].  His wife is going to call and schedule an appointment with that neurologist upon discharge.  He had been given explicit seizure instructions/restrictions.    Physical Exam at Discharge:  General: No acute distress, AAOx3  HEENT: EOMI, PERRL  Cardiovascular: +s1 and s2, RRR  Lungs: No rhonchi or wheezing  Abdomen: soft, nontender    Consults:   Consults       Date and Time Order Name Status Description    10/3/2023 11:59 AM Inpatient Psychiatrist Consult Completed     9/29/2023  1:08 PM Inpatient Hospitalist Consult Completed     9/27/2023 12:12 PM Inpatient Neurology Consult General Completed               Condition on Discharge: Stable, improved    Discharge Disposition  Home or Self Care    Discharge Medications     Discharge Medications        New Medications        Instructions Start Date   levETIRAcetam 1000 MG tablet  Commonly known as: KEPPRA   1,000 mg, Oral, Every 12 Hours Scheduled      metoprolol succinate XL 25 MG 24 hr tablet  Commonly known as: TOPROL-XL   25 mg, Oral, Every 24 Hours Scheduled   Start Date: October 7, 2023            Changes to Medications        Instructions Start Date   fluticasone 50 MCG/ACT nasal spray  Commonly known as: FLONASE  What changed:   when to take this  reasons to take this   2 sprays, Nasal, Daily      omeprazole 40 MG capsule  Commonly known as: priLOSEC  What changed:   when to take this  reasons to take this   40 mg, Oral, Daily             Continue These Medications        Instructions Start Date   lisinopril-hydrochlorothiazide 10-12.5 MG per tablet  Commonly known as: PRINZIDE,ZESTORETIC   TAKE ONE TABLET BY MOUTH DAILY      nicotine 21 MG/24HR patch  Commonly known as: Nicoderm CQ   1 patch, Transdermal, Every 24 Hours               Discharge Diet:   Diet Instructions       Diet:  Regular/House Diet; Thin (IDDSI 0)      Discharge Diet: Regular/House Diet    Fluid Consistency: Thin (IDDSI 0)            Activity at Discharge:   Activity Instructions       Activity as Tolerated              Follow-up Appointments  Future Appointments   Date Time Provider Department Center   2/8/2024  2:15 PM Indy Moffett APRN [facility]     Additional Instructions for the Follow-ups that You Need to Schedule       Ambulatory Referral to Sleep Medicine   As directed      Follow-up needed: Yes        Discharge Follow-up with PCP   As directed       Currently Documented PCP:    Indy Moffett APRN    PCP Phone Number:    [phone]     Follow Up Details: 1 week        Discharge Follow-up with Specialty: Neurology in 1 month   As directed      Specialty: Neurology in 1 month                I have examined and discussed discharge planning with the patient today.    I wore full protective equipment throughout the patient encounter including eye protection and facemask.  Hand hygiene was performed before donning protective equipment and after removal when leaving the room.     Yoshi Perdomo MD  10/06/23  09:07 EDT    Time: Discharge greater than 30 min

## 2023-10-08 ENCOUNTER — HOSPITAL ENCOUNTER (EMERGENCY)
Facility: HOSPITAL | Age: 48
Discharge: HOME | End: 2023-10-08
Attending: EMERGENCY MEDICINE | Admitting: EMERGENCY MEDICINE
Payer: COMMERCIAL

## 2023-10-08 ENCOUNTER — APPOINTMENT (OUTPATIENT)
Dept: CARDIOLOGY | Facility: HOSPITAL | Age: 48
End: 2023-10-08
Payer: COMMERCIAL

## 2023-10-08 VITALS
BODY MASS INDEX: 27.73 KG/M2 | OXYGEN SATURATION: 100 % | HEIGHT: 68 IN | SYSTOLIC BLOOD PRESSURE: 125 MMHG | HEART RATE: 92 BPM | WEIGHT: 182.98 LBS | DIASTOLIC BLOOD PRESSURE: 89 MMHG | TEMPERATURE: 98.3 F | RESPIRATION RATE: 18 BRPM

## 2023-10-08 DIAGNOSIS — I80.8 SUPERFICIAL THROMBOPHLEBITIS OF LEFT UPPER EXTREMITY: Primary | ICD-10-CM

## 2023-10-08 LAB
BH CV UPPER VENOUS LEFT AXILLARY AUGMENT: NORMAL
BH CV UPPER VENOUS LEFT AXILLARY COMPRESS: NORMAL
BH CV UPPER VENOUS LEFT AXILLARY PHASIC: NORMAL
BH CV UPPER VENOUS LEFT AXILLARY SPONT: NORMAL
BH CV UPPER VENOUS LEFT BASILIC FOREARM COMPRESS: NORMAL
BH CV UPPER VENOUS LEFT BASILIC UPPER COMPRESS: NORMAL
BH CV UPPER VENOUS LEFT BRACHIAL COMPRESS: NORMAL
BH CV UPPER VENOUS LEFT CEPHALIC FOREARM COLOR: 1
BH CV UPPER VENOUS LEFT CEPHALIC FOREARM COMPRESS: NORMAL
BH CV UPPER VENOUS LEFT CEPHALIC FOREARM THROMBUS: NORMAL
BH CV UPPER VENOUS LEFT CEPHALIC UPPER COLOR: 1
BH CV UPPER VENOUS LEFT CEPHALIC UPPER COMPRESS: NORMAL
BH CV UPPER VENOUS LEFT CEPHALIC UPPER THROMBUS: NORMAL
BH CV UPPER VENOUS LEFT INTERNAL JUGULAR AUGMENT: NORMAL
BH CV UPPER VENOUS LEFT INTERNAL JUGULAR COMPRESS: NORMAL
BH CV UPPER VENOUS LEFT INTERNAL JUGULAR PHASIC: NORMAL
BH CV UPPER VENOUS LEFT INTERNAL JUGULAR SPONT: NORMAL
BH CV UPPER VENOUS LEFT RADIAL COMPRESS: NORMAL
BH CV UPPER VENOUS LEFT SUBCLAVIAN AUGMENT: NORMAL
BH CV UPPER VENOUS LEFT SUBCLAVIAN COMPRESS: NORMAL
BH CV UPPER VENOUS LEFT SUBCLAVIAN PHASIC: NORMAL
BH CV UPPER VENOUS LEFT SUBCLAVIAN SPONT: NORMAL
BH CV UPPER VENOUS LEFT ULNAR COMPRESS: NORMAL
BH CV UPPER VENOUS RIGHT INTERNAL JUGULAR AUGMENT: NORMAL
BH CV UPPER VENOUS RIGHT INTERNAL JUGULAR COMPRESS: NORMAL
BH CV UPPER VENOUS RIGHT INTERNAL JUGULAR PHASIC: NORMAL
BH CV UPPER VENOUS RIGHT INTERNAL JUGULAR SPONT: NORMAL
BH CV UPPER VENOUS RIGHT SUBCLAVIAN AUGMENT: NORMAL
BH CV UPPER VENOUS RIGHT SUBCLAVIAN COMPRESS: NORMAL
BH CV UPPER VENOUS RIGHT SUBCLAVIAN PHASIC: NORMAL
BH CV UPPER VENOUS RIGHT SUBCLAVIAN SPONT: NORMAL
BH CV VAS PRELIMINARY FINDINGS SCRIPTING: 1

## 2023-10-08 PROCEDURE — 99284 EMERGENCY DEPT VISIT MOD MDM: CPT

## 2023-10-08 PROCEDURE — 93971 EXTREMITY STUDY: CPT

## 2023-10-08 PROCEDURE — 93971 EXTREMITY STUDY: CPT | Performed by: SURGERY

## 2023-10-08 NOTE — ED NOTES
"Left AC area red, swollen, sore to touch. Patient states, \"I was in the hospital, was a little nicolette nicolette, I kept pulling my Ivs out.\"   "

## 2023-10-08 NOTE — ED PROVIDER NOTES
Time: 11:12 AM EDT  Date of encounter:  10/8/2023  Independent Historian/Clinical History and Information was obtained by:   Patient and Family    History is limited by: N/A    Chief Complaint: Arm swelling      History of Present Illness:  Patient is a 47 y.o. year old male who presents to the emergency department for evaluation of arm swelling.  Patient states that he was recently discharged from the hospital 2 days ago and since that time he has had swelling of his right forearm.  Patient does state he was hospitalized he had pulled multiple IVs from his arm due to agitation with his alcohol withdrawal.  Patient states since discharge the swelling has been increasing and he is having increasing pain in the forearm as well.  Patient denies any history of DVT.  Patient denies any chest pain or shortness of breath.  Patient's not on any blood thinners.    HPI    Patient Care Team  Primary Care Provider: Indy Moffett APRN    Past Medical History:     Allergies   Allergen Reactions    Penicillins Unknown - High Severity     Past Medical History:   Diagnosis Date    Chronic allergic rhinitis     High blood pressure     Hyperlipemia     Hypertension     No pertinent past medical history      Past Surgical History:   Procedure Laterality Date    COLONOSCOPY      COLONOSCOPY N/A 12/20/2021    Procedure: COLONOSCOPY with possible biopies.;  Surgeon: Aaron Perla MD;  Location: [facility] ENDOSCOPY;  Service: General;  Laterality: N/A;  COLON POLYPS     Family History   Problem Relation Age of Onset    Cancer Mother     Diabetes Mother     Other Father         RENAL CALCULUS    Diabetes Father     Cancer Brother     Breast cancer Other 30        AUNT    Lung cancer Other         UNCLE    Malig Hyperthermia Neg Hx        Home Medications:  Prior to Admission medications    Medication Sig Start Date End Date Taking? Authorizing Provider   fluticasone (FLONASE) 50 MCG/ACT nasal spray 2 sprays into the nostril(s) as  "directed by provider Daily. 5/11/23   Indy Moffett APRN   levETIRAcetam (KEPPRA) 1000 MG tablet Take 1 tablet by mouth Every 12 (Twelve) Hours 10/6/23 11/5/23  Yoshi Perdomo MD   lisinopril-hydrochlorothiazide (PRINZIDE,ZESTORETIC) 10-12.5 MG per tablet TAKE ONE TABLET BY MOUTH DAILY 7/24/23   Indy Moffett APRN   metoprolol succinate XL (TOPROL-XL) 25 MG 24 hr tablet Take 1 tablet by mouth Daily 10/7/23 11/6/23  Yoshi Perdomo MD   nicotine (Nicoderm CQ) 21 MG/24HR patch Place 1 patch on the skin as directed by provider Daily. 8/29/23   Indy Moffett APRN   omeprazole (priLOSEC) 40 MG capsule Take 1 capsule by mouth Daily. 2/2/22   Indy Moffett APRN        Social History:   Social History     Tobacco Use    Smoking status: Every Day     Packs/day: .25     Types: Cigarettes     Passive exposure: Current    Smokeless tobacco: Never    Tobacco comments:     CURRENT EVERY DAY SMOKER, SMOKED 21-30 YEARS   Vaping Use    Vaping Use: Never used   Substance Use Topics    Alcohol use: Yes     Comment: 4 DRINKS PER DAY, HAS BEEN DRINKING FOR 21-30 YEARS    Drug use: Not Currently     Comment: \"Long time ago party drugs\"         Review of Systems:  Review of Systems   Respiratory:  Negative for shortness of breath.    Cardiovascular:  Negative for chest pain.   Musculoskeletal:  Positive for arthralgias.   Skin:  Negative for color change and wound.   All other systems reviewed and are negative.       Physical Exam:  /89 (BP Location: Left arm, Patient Position: Sitting)   Pulse 92   Temp 98.3 øF (36.8 øC) (Oral)   Resp 18   Ht 172.7 cm (68\")   Wt 83 kg (182 lb 15.7 oz)   SpO2 100%   BMI 27.82 kg/mý     Physical Exam  Vitals and nursing note reviewed.   Constitutional:       General: He is not in acute distress.     Appearance: Normal appearance. He is normal weight. He is not ill-appearing, toxic-appearing or diaphoretic.   HENT:      Head: Normocephalic and atraumatic.      " Nose: Nose normal.   Eyes:      Extraocular Movements: Extraocular movements intact.      Conjunctiva/sclera: Conjunctivae normal.      Pupils: Pupils are equal, round, and reactive to light.   Cardiovascular:      Rate and Rhythm: Normal rate and regular rhythm.      Pulses:           Radial pulses are 2+ on the right side and 2+ on the left side.      Heart sounds: Normal heart sounds.   Pulmonary:      Effort: Pulmonary effort is normal.      Breath sounds: Normal breath sounds.   Abdominal:      General: Abdomen is flat. There is no distension.   Musculoskeletal:         General: Normal range of motion.      Left forearm: Swelling present. No edema, deformity, lacerations, tenderness or bony tenderness.      Cervical back: Normal range of motion and neck supple.   Skin:     General: Skin is warm and dry.   Neurological:      General: No focal deficit present.      Mental Status: He is alert and oriented to person, place, and time.   Psychiatric:         Mood and Affect: Mood normal.         Behavior: Behavior normal.         Thought Content: Thought content normal.         Judgment: Judgment normal.               Procedures:  Procedures      Medical Decision Making:    Comorbidities that affect care:    Hyperlipidemia, Hypertension    External Notes reviewed:    Previous Admission Note: Admission note from 9- through 10-6-2023 where patient was admitted for obstructive sleep apnea, status epilepticus, and hypoxia      The following orders were placed and all results were independently analyzed by me:  No orders of the defined types were placed in this encounter.      Medications Given in the Emergency Department:  Medications - No data to display     ED Course:    ED Course as of 10/08/23 1325   Sun Oct 08, 2023   1120  made aware of duplex ultrasound order so that  be contacted to have this completed. [MD]   1253 Vascular ultrasound called report at this time and states patient has  acute superficial thrombus but no DVT [MD]      ED Course User Index  [MD] Stanford Barraza PA-C       Labs:    Lab Results (last 24 hours)       ** No results found for the last 24 hours. **             Imaging:    No Radiology Exams Resulted Within Past 24 Hours      Differential Diagnosis and Discussion:    Extremity Pain: Differential diagnosis includes but is not limited to soft tissue sprain, tendonitis, tendon injury, dislocation, fracture, deep vein thrombosis, arterial insufficiency, osteoarthritis, bursitis, and ligamentous damage.    Ultrasound impression was interpreted by me.     MDM  Number of Diagnoses or Management Options  Diagnosis management comments: Patient presented to the emergency department today for evaluation of left arm swelling.  Duplex ultrasound was completed and is negative for DVT but does note acute superficial thrombophlebitis.       Amount and/or Complexity of Data Reviewed  Tests in the radiology section of CP[facility]: ordered and reviewed    Risk of Complications, Morbidity, and/or Mortality  Presenting problems: moderate  Diagnostic procedures: low  Management options: low    Patient Progress  Patient progress: stable       Consultants/Shared Management Plan:    None    Social Determinants of Health:    Patient is independent, reliable, and has access to care.       Disposition and Care Coordination:    Discharged: The patient is suitable and stable for discharge with no need for consideration of observation or admission.    I have explained the patient's condition, diagnoses and treatment plan based on the information available to me at this time. I have answered questions and addressed any concerns. The patient has a good  understanding of the patient's diagnosis, condition, and treatment plan as can be expected at this point. The vital signs have been stable. The patient's condition is stable and appropriate for discharge from the emergency department.      The patient will  pursue further outpatient evaluation with the primary care physician or other designated or consulting physician as outlined in the discharge instructions. They are agreeable to this plan of care and follow-up instructions have been explained in detail. The patient has received these instructions in written format and have expressed an understanding of the discharge instructions. The patient is aware that any significant change in condition or worsening of symptoms should prompt an immediate return to this or the closest emergency department or call to 911.  I have explained discharge medications and the need for follow up with the patient/caretakers. This was also printed in the discharge instructions. Patient was discharged with the following medications and follow up:      Medication List      No changes were made to your prescriptions during this visit.      Indy Moffett, APRN  [address] [facility] [address]  [place] [address]  [phone]             Final diagnoses:   Superficial thrombophlebitis of left upper extremity        ED Disposition       ED Disposition   Discharge    Condition   Stable    Comment   --               This medical record created using voice recognition software.             Stanford Barraza PA-C  10/08/23 4775

## 2023-10-08 NOTE — DISCHARGE INSTRUCTIONS
You may apply warm compresses and take anti-inflammatory medication such as ibuprofen as needed for treatment.  The ultrasound did not show any blood clot in your arm.

## 2023-10-09 ENCOUNTER — READMISSION MANAGEMENT (OUTPATIENT)
Dept: CALL CENTER | Facility: HOSPITAL | Age: 48
End: 2023-10-09
Payer: COMMERCIAL

## 2023-10-09 NOTE — PAYOR COMM NOTE
"Agustin Stubbs (47 y.o. Male)     PLEASE SEE ATTACHED FOR DC NOTICE    REF #  [id number]     THANK YOU  DESIREE SOLANO RN/ DEPT  [facility]   [phone]  [phone]        Date of Birth   1975    Social Security Number       Address   [address] [place] [address]    Home Phone   [phone]    MRN   [id number]       [place]   None    Marital Status                               Admission Date   9/27/23    Admission Type   Urgent    Admitting Provider   Lillie Lindquist MD    Attending Provider       Department, Room/Bed   [facility], S515/1       Discharge Date   10/6/2023    Discharge Disposition   Home or Self Care    Discharge Destination                                 Attending Provider: (none)   Allergies: Penicillins    Isolation: None   Infection: None   Code Status: Prior    Ht: 172.7 cm (68\")   Wt: 93 kg (205 lb 0.4 oz)    Admission Cmt: None   Principal Problem: Status epilepticus [G40.901]                   Active Insurance as of 9/27/2023       Primary Coverage       Payor Plan Insurance Group Employer/Plan Group    MISC MULTI PLAN MISC MULTIPLAN - [id number]       Coverage Address Coverage Phone Number Coverage Fax Number Effective Dates    [address] [phone]  9/17/2022 - None Entered    [facility] [id number]         Subscriber Name Subscriber Birth Date Member ID       AGUSTIN STUBBS 1975 [id number]                     Emergency Contacts        (Rel.) Home Phone Work Phone Mobile Phone    MAGI HUGGINS (Significant Other) [phone] -- [phone]              [place]: [facility] [id number]  Tax ID [id number]     Discharge Summary        Yoshi Perdomo MD at 10/06/23 0907            Date of Admission: 9/27/2023  Date of Discharge:  10/6/2023  Primary Care Physician: Indy Moffett APRN     Discharge Diagnosis:  Active Hospital Problems    Diagnosis  POA    **Status epilepticus " [G40.901]  Yes    TBI (traumatic brain injury) [S06.9XAA]  Unknown    Delirium, acute [R41.0]  Unknown    Alcohol withdrawal [F10.939]  Unknown    Hypoxia [R09.02]  Unknown    Essential hypertension [I10]  Yes      Resolved Hospital Problems   No resolved problems to display.       DETAILS OF HOSPITAL STAY     Hospital Course  This is a 46yo gentleman with history of TBI, tobacco abuse, alcohol abuse, and HTN, who was transferred to [facility] ICU from [facility] with status epilepticus and acute resp failure.  Please see intensivist notes from the beginning of his hospitalization for additional details of his presentation.  He was stabilized in the ICU and eventually extubated.  Status epilepticus was brought under control, he is now on scheduled doses of Keppra and the hospital service was asked to take over his care.  Upon transition out of the ICU he developed acute encephalopathy/delirium requiring violent restraints. It was felt that this was alcohol withdrawal and was started on CIWA protocol and phenobarbital taper but unfortunately it did not improve any of his symptoms.  At that point the question was asked as to whether he was truly going through alcohol withdrawal or not.  Psychiatry was consulted and they stopped the Ativan and phenobarbital.  He was started on Zyprexa.  It was ultimately felt that these behavioral changes were delirium and encephalopathy from his underlying traumatic brain injury and the fact that he had status epilepticus with severe seizure.  With adjustment of these medications he is now significantly improved and back to his cognitive baseline.  He has been restraint free for many days.  I discussed with the patient as well as his wife who is at the bedside.  He looks medically stable at this time, consulting services have signed off and he will be discharged back home.  The patient has followed with a neurologist in the past not associated with the group.  Joe.  His wife is  going to call and schedule an appointment with that neurologist upon discharge.  He had been given explicit seizure instructions/restrictions.    Physical Exam at Discharge:  General: No acute distress, AAOx3  HEENT: EOMI, PERRL  Cardiovascular: +s1 and s2, RRR  Lungs: No rhonchi or wheezing  Abdomen: soft, nontender    Consults:   Consults       Date and Time Order Name Status Description    10/3/2023 11:59 AM Inpatient Psychiatrist Consult Completed     9/29/2023  1:08 PM Inpatient Hospitalist Consult Completed     9/27/2023 12:12 PM Inpatient Neurology Consult General Completed               Condition on Discharge: Stable, improved    Discharge Disposition  Home or Self Care    Discharge Medications     Discharge Medications        New Medications        Instructions Start Date   levETIRAcetam 1000 MG tablet  Commonly known as: KEPPRA   1,000 mg, Oral, Every 12 Hours Scheduled      metoprolol succinate XL 25 MG 24 hr tablet  Commonly known as: TOPROL-XL   25 mg, Oral, Every 24 Hours Scheduled   Start Date: October 7, 2023            Changes to Medications        Instructions Start Date   fluticasone 50 MCG/ACT nasal spray  Commonly known as: FLONASE  What changed:   when to take this  reasons to take this   2 sprays, Nasal, Daily      omeprazole 40 MG capsule  Commonly known as: priLOSEC  What changed:   when to take this  reasons to take this   40 mg, Oral, Daily             Continue These Medications        Instructions Start Date   lisinopril-hydrochlorothiazide 10-12.5 MG per tablet  Commonly known as: PRINZIDE,ZESTORETIC   TAKE ONE TABLET BY MOUTH DAILY      nicotine 21 MG/24HR patch  Commonly known as: Nicoderm CQ   1 patch, Transdermal, Every 24 Hours               Discharge Diet:   Diet Instructions       Diet: Regular/House Diet; Thin (IDDSI 0)      Discharge Diet: Regular/House Diet    Fluid Consistency: Thin (IDDSI 0)            Activity at Discharge:   Activity Instructions       Activity as Tolerated               Follow-up Appointments  Future Appointments   Date Time Provider Department [place]   2/8/2024  2:15 PM Indy Moffett APRN [facility]     Additional Instructions for the Follow-ups that You Need to Schedule       Ambulatory Referral to Sleep Medicine   As directed      Follow-up needed: Yes        Discharge Follow-up with PCP   As directed       Currently Documented PCP:    Indy Moffett APRN    PCP Phone Number:    [phone]     Follow Up Details: 1 week        Discharge Follow-up with Specialty: Neurology in 1 month   As directed      Specialty: Neurology in 1 month                I have examined and discussed discharge planning with the patient today.    I wore full protective equipment throughout the patient encounter including eye protection and facemask.  Hand hygiene was performed before donning protective equipment and after removal when leaving the room.     Yoshi Perdomo MD  10/06/23  09:07 EDT    Time: Discharge greater than 30 min            Electronically signed by Yoshi Perdomo MD at 10/06/23 0912

## 2023-10-09 NOTE — OUTREACH NOTE
Prep Survey      Flowsheet Row Responses   [facility] patient discharged from? [place]   Is LACE score < 7 ? No   Eligibility [facility]   Date of Admission 09/27/23   Date of Discharge 10/06/23   Discharge Disposition Home or Self Care  [HH agencies have declined pt]   Discharge diagnosis Status epilepticus   Does the patient have one of the following disease processes/diagnoses(primary or secondary)? Other   Does the patient have Home health ordered? No  [HH agencies have declined per CM note]   Medication alerts for this patient see AVS for all meds   Prep survey completed? Yes            Cecy OSCAR - Registered Nurse

## 2023-10-10 ENCOUNTER — TRANSITIONAL CARE MANAGEMENT TELEPHONE ENCOUNTER (OUTPATIENT)
Dept: CALL CENTER | Facility: HOSPITAL | Age: 48
End: 2023-10-10
Payer: COMMERCIAL

## 2023-10-10 NOTE — OUTREACH NOTE
"Call Center TCM Note      Flowsheet Row Responses   [facility] patient discharged from? [facility]   Does the patient have one of the following disease processes/diagnoses(primary or secondary)? Other  [ER 10/8/23]   TCM attempt successful? Yes   Call start time 1142   Call end time 1147   Discharge diagnosis Status epilepticus   Meds reviewed with patient/caregiver? Yes   Is the patient having any side effects they believe may be caused by any medication additions or changes? No   Does the patient have all medications ordered at discharge? Yes   Is the patient taking all medications as directed (includes completed medication regime)? Yes   Comments HOSP DC FU appt 10/11/23 245 pm.   Does the patient have an appointment with their PCP within 7-14 days of discharge? Yes   Has home health visited the patient within 72 hours of discharge? N/A   Psychosocial issues? No   Did the patient receive a copy of their discharge instructions? Yes   Nursing interventions Reviewed instructions with patient   What is the patient's perception of their health status since discharge? Improving   Is the patient/caregiver able to teach back signs and symptoms related to disease process for when to call PCP? Yes   Is the patient/caregiver able to teach back signs and symptoms related to disease process for when to call 911? Yes   Is the patient/caregiver able to teach back the hierarchy of who to call/visit for symptoms/problems? PCP, Specialist, Home health nurse, Urgent Care, ED, 911 Yes   TCM call completed? Yes   Wrap up additional comments Pt reports that he has not had any seizure activity. States he still has 2 \"lumps\" to back of head. States he had scan while in hospital. No changes with that and no new s/s. Pt had been in ER for left ext phlebitis and reports that is improving. Pt aware of appt with PCP tomorrow.   Call end time 1147            Chikis Gaviria RN    10/10/2023, 11:47 EDT        "

## 2023-10-11 ENCOUNTER — OFFICE VISIT (OUTPATIENT)
Dept: FAMILY MEDICINE CLINIC | Facility: CLINIC | Age: 48
End: 2023-10-11
Payer: COMMERCIAL

## 2023-10-11 ENCOUNTER — TELEPHONE (OUTPATIENT)
Dept: FAMILY MEDICINE CLINIC | Facility: CLINIC | Age: 48
End: 2023-10-11
Payer: COMMERCIAL

## 2023-10-11 VITALS — HEIGHT: 68 IN | HEART RATE: 97 BPM | WEIGHT: 204.8 LBS | BODY MASS INDEX: 31.04 KG/M2 | OXYGEN SATURATION: 98 %

## 2023-10-11 DIAGNOSIS — Z09 HOSPITAL DISCHARGE FOLLOW-UP: Primary | ICD-10-CM

## 2023-10-11 DIAGNOSIS — L08.9 SKIN INFECTION: ICD-10-CM

## 2023-10-11 DIAGNOSIS — I10 PRIMARY HYPERTENSION: ICD-10-CM

## 2023-10-11 DIAGNOSIS — R56.9 SEIZURES: ICD-10-CM

## 2023-10-11 RX ORDER — MUPIROCIN CALCIUM 20 MG/G
1 CREAM TOPICAL 3 TIMES DAILY
Qty: 15 G | Refills: 0 | Status: SHIPPED | OUTPATIENT
Start: 2023-10-11

## 2023-10-11 NOTE — TELEPHONE ENCOUNTER
Caller: [facility] [id number] - [place], [place] - [address] [facility] [address] - [phone] [facility] [phone] FX    Relationship: Pharmacy    What medications are you currently taking:   Current Outpatient Medications on File Prior to Visit   Medication Sig Dispense Refill    fluticasone (FLONASE) 50 MCG/ACT nasal spray 2 sprays into the nostril(s) as directed by provider Daily. 11.1 mL 1    levETIRAcetam (KEPPRA) 1000 MG tablet Take 1 tablet by mouth Every 12 (Twelve) Hours 60 tablet 0    lisinopril-hydrochlorothiazide (PRINZIDE,ZESTORETIC) 10-12.5 MG per tablet TAKE ONE TABLET BY MOUTH DAILY 90 tablet 1    metoprolol succinate XL (TOPROL-XL) 25 MG 24 hr tablet Take 1 tablet by mouth Daily 30 tablet 0    mupirocin (BACTROBAN) 2 % cream Apply 1 application  topically to the appropriate area as directed 3 (Three) Times a Day. 15 g 0    omeprazole (priLOSEC) 40 MG capsule Take 1 capsule by mouth Daily. (Patient taking differently: Take 1 capsule by mouth Daily. Pt states that he takes it PRN) 90 capsule 1    [DISCONTINUED] nicotine (Nicoderm CQ) 21 MG/24HR patch Place 1 patch on the skin as directed by provider Daily. (Patient not taking: Reported on 10/11/2023) 30 each 0     No current facility-administered medications on file prior to visit.      What are your concerns: PHARMACY CALLED AND STATED THE mupirocin (BACTROBAN) 2 % cream MEDICATION WILL BE OVER 100 DOLLARS FOR PATIENT TO  AND THEY WOULD LIKE TO KNOW IF THEY COULD SWITCH IT TO THE OINTMENT INSTEAD PLEASE ADVISE

## 2023-10-11 NOTE — PROGRESS NOTES
Transitional Care Follow Up Visit  Subjective     Agustin Stubbs is a 47 y.o. male who presents for a transitional care management visit.    Within 48 business hours after discharge our office contacted him via telephone to coordinate his care and needs.      I reviewed and discussed the details of that call along with the discharge summary, hospital problems, inpatient lab results, inpatient diagnostic studies, and consultation reports with Agustin.     Current outpatient and discharge medications have been reconciled for the patient.  Reviewed by: Ariela Thurman          10/9/2023    10:19 AM   Date of TCM Phone Call   [facility]   Date of Admission 9/27/2023   Date of Discharge 10/6/2023   Discharge Disposition Home or Self Care     Risk for Readmission (LACE) Score: 9 (10/6/2023  6:00 AM)      History of Present Illness   Course During Hospital Stay:  ***     {Common H&P Review Areas:84043}    Review of Systems    Objective   Physical Exam    Assessment & Plan   {Assess/PlanSmartLinks:26571}

## 2023-10-11 NOTE — PROGRESS NOTES
Chief Complaint  Seizures, seizures, HTN, spot on head    SUBJECTIVE  Agustin Stubbs presents to [facility]    History of Present Illness  Patient is a 47-year-old male who presents today with his wife for hospital follow-up for status epilepticus.  Patient was hospitalized at [facility] ICU.  Patient was discharged home on Keppra 1000 mg twice daily and metoprolol 25 mg.  Patient has no recollection of when his seizures started or most of his time in the hospital.  Patient had CT of head and MRI brain that were negative.  While hospitalized her developed delirium and was started on CIWA protocol due to suspected alcohol withdrawal due to alcohol abuse. His wife reports she thinks the Ativan made it worse and once that was stopped his delirium resolved.  Patient has a history of TBI.  Patient previously saw Dr. Wong and would like referral back to see him.  Reports doing well on new medication starts.  Patient has not had any seizure activity since discharge from hospital.  Patient's wife reports that he has been having trouble sleeping since discharging home.  Patient reports he sleeping about 4-hour intervals.  Patient has not resume Zestoretic as prescribed by PCP since discharge home.  Upon review of discharge summary from hospitalist he is to continue this medication.  Patient's blood pressure is well controlled at 124/81 today.    Patient also reports concerns of abnormal in the back of his head.  His wife first noticed that after he had been in the hospital.  Patient's wife reports it started as a size of a lemon has gradually shrunk.  Patient reports he still has tenderness.  Patient has area on his scalp that is crusting.  Patient voices concerns that could be a potential spider bite but he is unsure.  His wife reports that she has an appointment with a dermatologist but is unable to get them for about a month.    No other questions or concerns today.      Past  "Medical History:   Diagnosis Date    Chronic allergic rhinitis     High blood pressure     Hyperlipemia     Hypertension     No pertinent past medical history       Family History   Problem Relation Age of Onset    Cancer Mother     Diabetes Mother     Other Father         RENAL CALCULUS    Diabetes Father     Cancer Brother     Breast cancer Other 30        AUNT    Lung cancer Other         UNCLE    Malig Hyperthermia Neg Hx       Past Surgical History:   Procedure Laterality Date    COLONOSCOPY      COLONOSCOPY N/A 12/20/2021    Procedure: COLONOSCOPY with possible biopies.;  Surgeon: Aaron Perla MD;  Location: [facility] ENDOSCOPY;  Service: General;  Laterality: N/A;  COLON POLYPS        Current Outpatient Medications:     fluticasone (FLONASE) 50 MCG/ACT nasal spray, 2 sprays into the nostril(s) as directed by provider Daily., Disp: 11.1 mL, Rfl: 1    levETIRAcetam (KEPPRA) 1000 MG tablet, Take 1 tablet by mouth Every 12 (Twelve) Hours, Disp: 60 tablet, Rfl: 0    lisinopril-hydrochlorothiazide (PRINZIDE,ZESTORETIC) 10-12.5 MG per tablet, TAKE ONE TABLET BY MOUTH DAILY, Disp: 90 tablet, Rfl: 1    metoprolol succinate XL (TOPROL-XL) 25 MG 24 hr tablet, Take 1 tablet by mouth Daily, Disp: 30 tablet, Rfl: 0    omeprazole (priLOSEC) 40 MG capsule, Take 1 capsule by mouth Daily. (Patient taking differently: Take 1 capsule by mouth Daily. Pt states that he takes it PRN), Disp: 90 capsule, Rfl: 1    mupirocin (BACTROBAN) 2 % cream, Apply 1 application  topically to the appropriate area as directed 3 (Three) Times a Day., Disp: 15 g, Rfl: 0    OBJECTIVE  Vital Signs:   Pulse 97   Ht 172.7 cm (68\")   Wt 92.9 kg (204 lb 12.8 oz)   SpO2 98%   BMI 31.14 kg/mý    Estimated body mass index is 31.14 kg/mý as calculated from the following:    Height as of this encounter: 172.7 cm (68\").    Weight as of this encounter: 92.9 kg (204 lb 12.8 oz).     Wt Readings from Last 3 Encounters:   10/11/23 92.9 kg (204 lb 12.8 oz) "   10/08/23 83 kg (182 lb 15.7 oz)   10/06/23 93 kg (205 lb 0.4 oz)     BP Readings from Last 3 Encounters:   10/08/23 125/89   10/06/23 114/78   09/27/23 105/69       Physical Exam  Vitals reviewed.   Constitutional:       General: He is not in acute distress.     Appearance: Normal appearance. He is not ill-appearing.   HENT:      Head: Normocephalic and atraumatic.        Comments: Erythematous nickel sized area on scalp with crusting noted, tender to touch  Eyes:      Conjunctiva/sclera: Conjunctivae normal.   Cardiovascular:      Rate and Rhythm: Normal rate and regular rhythm.      Heart sounds: Normal heart sounds.   Pulmonary:      Effort: Pulmonary effort is normal.      Breath sounds: Normal breath sounds.   Musculoskeletal:         General: Normal range of motion.      Cervical back: Normal range of motion.   Skin:     General: Skin is warm and dry.   Neurological:      General: No focal deficit present.      Mental Status: He is alert and oriented to person, place, and time. Mental status is at baseline.   Psychiatric:         Mood and Affect: Mood normal.         Behavior: Behavior normal.         Thought Content: Thought content normal.         Judgment: Judgment normal.          Result Review    Common labs          9/28/2023    09:14 9/29/2023    03:38 10/3/2023    12:32   Common Labs   Glucose 124   102    BUN 12   16    Creatinine 0.95   0.99    Sodium 139   142    Potassium 3.8   3.7    Chloride 108   107    Calcium 8.4   9.6    Albumin 3.9   3.9    Total Bilirubin 0.9   0.7    Alkaline Phosphatase 54   65    AST (SGOT) 25   39    ALT (SGPT) 21   42    WBC  10.52     Hemoglobin  13.6     Hematocrit  39.3     Platelets  208         XR Chest 1 View    Result Date: 10/1/2023  Lung volumes have diminished and there is perihilar and basilar atelectasis without definite infiltrate or pulmonary edema allowing for diminished lung volumes. Recommend follow-up exam if patient's symptoms persist or worsen.   This report was finalized on 10/1/2023 7:29 AM by Dr. Capo Andrea M.D.      MRI Brain With & Without Contrast    Result Date: 9/30/2023  1. No acute intracranial abnormality.   No abnormal enhancement.   This report was finalized on 9/30/2023 8:32 PM by Dr. Estrella Villalobos M.D.      XR Chest 1 View    Result Date: 9/27/2023  Hardware as described.  This report was finalized on 9/27/2023 1:19 PM by Dr. Gerard Vora M.D.      XR Abdomen KUB    Result Date: 9/27/2023    1. Enteric tube has been advanced with the tip in the area of the stomach.     GLADYS NORWOOD MD       Electronically Signed and Approved By: GLADYS NORWOOD MD on 9/27/2023 at 9:31             XR Abdomen KUB    Result Date: 9/27/2023    1. NG tube, as above.  Recommend retracting the tube approximately 5 cm and then advancing the tube 8 cm for better positioning.     Jamil Nunez M.D.       Electronically Signed and Approved By: Jamil Nunez M.D. on 9/27/2023 at 8:13             CT Head Without Contrast    Result Date: 9/27/2023   No acute intracranial hemorrhage is seen.  No acute infarct.  No significant interval change is appreciated since the 09/01 2019 CT study with bifrontal and bi-temporal, left greater than right, posttraumatic encephalomalacia suspected, as detailed above.     Please note that portions of this note were completed with a voice recognition program.  PAPI SHERWOOD JR, MD       Electronically Signed and Approved By: PAPI SHERWOOD JR, MD on 9/27/2023 at 5:51              XR Chest 1 View    Result Date: 9/27/2023    The endotracheal (ET) tube is in satisfactory position.  No acute infiltrate is seen.  Probably no cardiac enlargement.      Please note that portions of this note were completed with a voice recognition program.  PAPI SHERWOOD JR, MD       Electronically Signed and Approved By: PAPI SHERWOOD JR, MD on 9/27/2023 at 5:42                  The above data has been reviewed by BRITTANY Johnson 10/11/2023 14:50  EDT.          Patient Care Team:  Indy Moffett APRN as PCP - General (Nurse Practitioner)  Meera Ballard APRN as Nurse Practitioner (Nurse Practitioner)           ASSESSMENT & PLAN    Diagnoses and all orders for this visit:    1. Hospital discharge follow-up (Primary)  Comments:  [facility] has adequate supply of refills, all referrals placed,    2. Seizures  Comments:  stable on Keppra 1000 mg, continue, urgent referral to neuro placed for condition management  Orders:  -     Ambulatory Referral to Neurology    3. Primary hypertension  Comments:  continue zestoretic and metoprolol as prescribed by PCP and hospitalist, if BP becomes too low follow up with PCP    4. Skin infection  Comments:  start bactroban ointment, follow up if no better, keep dermatology appt  Orders:  -     mupirocin (BACTROBAN) 2 % cream; Apply 1 application  topically to the appropriate area as directed 3 (Three) Times a Day.  Dispense: 15 g; Refill: 0         Tobacco Use: High Risk (10/11/2023)    Patient History     Smoking Tobacco Use: Every Day     Smokeless Tobacco Use: Never     Passive Exposure: Current       Follow Up     No follow-ups on file.      Patient was given instructions and counseling regarding his condition or for health maintenance advice. Please see specific information pulled into the AVS if appropriate.   I have reviewed information obtained and documented by others and I have confirmed the accuracy of this documented note.    BRITTANY Johnson

## 2023-10-30 DIAGNOSIS — H93.8X3 SENSATION OF FULLNESS IN BOTH EARS: ICD-10-CM

## 2023-10-30 RX ORDER — FLUTICASONE PROPIONATE 50 MCG
2 SPRAY, SUSPENSION (ML) NASAL DAILY
Qty: 11.1 ML | Refills: 1 | Status: SHIPPED | OUTPATIENT
Start: 2023-10-30

## 2023-10-30 RX ORDER — LEVETIRACETAM 1000 MG/1
1000 TABLET ORAL EVERY 12 HOURS SCHEDULED
Qty: 60 TABLET | Refills: 0 | Status: CANCELLED | OUTPATIENT
Start: 2023-10-30 | End: 2023-11-29

## 2023-10-30 RX ORDER — METOPROLOL SUCCINATE 25 MG/1
25 TABLET, EXTENDED RELEASE ORAL
Qty: 30 TABLET | Refills: 0 | Status: SHIPPED | OUTPATIENT
Start: 2023-10-30 | End: 2023-11-29

## 2023-10-30 NOTE — TELEPHONE ENCOUNTER
FLONASE  [facility] 05/11/2023  LOV 10/11/2023  F/U 02/08/2024    KEPPRA  [facility] 10/06/2023 last filled by tyler Red  LOV 10/11/2023  F/U 02/08/2024    TOPROL-XL  [facility] 10/07/2023  last filled by tyler Red  LOV 10/11/2023  F/U 02/08/2024

## 2023-10-30 NOTE — TELEPHONE ENCOUNTER
Caller: MAGI HUGGINS    Relationship: Emergency Contact    Best call back number: [phone]     Requested Prescriptions:   Requested Prescriptions     Pending Prescriptions Disp Refills    levETIRAcetam (KEPPRA) 1000 MG tablet 60 tablet 0     Sig: Take 1 tablet by mouth Every 12 (Twelve) Hours    metoprolol succinate XL (TOPROL-XL) 25 MG 24 hr tablet 30 tablet 0     Sig: Take 1 tablet by mouth Daily    fluticasone (FLONASE) 50 MCG/ACT nasal spray 11.1 mL 1     Si sprays into the nostril(s) as directed by provider Daily.        Pharmacy where request should be sent: [id number] [place] [facility] [address] [facility] [address] [facility] [phone] [facility] [phone] FX     Last office visit with prescribing clinician: 2023   Last telemedicine visit with prescribing clinician: Visit date not found   Next office visit with prescribing clinician: 2024       Does the patient have less than a 3 day supply:  [] Yes  [x] No      Elba Coffman Rep   10/30/23 09:56 EDT

## 2023-10-30 NOTE — TELEPHONE ENCOUNTER
I did not fill keppra this needs to come from a neurologists, does pt not have a f/u with Neurology for his seizure disorder?  He was in the hospital for some time and should have f/u with neurology.

## 2023-10-30 NOTE — TELEPHONE ENCOUNTER
Pt is scheduled to see Dr. Wong for neurology 11/2023 Pt is requesting #30 until able to get to appt. Pt aware Dr. Wong to fill once established.

## 2023-10-31 RX ORDER — LEVETIRACETAM 1000 MG/1
1000 TABLET ORAL EVERY 12 HOURS SCHEDULED
Qty: 60 TABLET | Refills: 0 | Status: SHIPPED | OUTPATIENT
Start: 2023-10-31 | End: 2023-11-30

## 2023-11-03 ENCOUNTER — OFFICE VISIT (OUTPATIENT)
Dept: SLEEP MEDICINE | Facility: HOSPITAL | Age: 48
End: 2023-11-03
Payer: COMMERCIAL

## 2023-11-03 VITALS
OXYGEN SATURATION: 96 % | WEIGHT: 208.7 LBS | SYSTOLIC BLOOD PRESSURE: 142 MMHG | HEIGHT: 65 IN | DIASTOLIC BLOOD PRESSURE: 83 MMHG | BODY MASS INDEX: 34.77 KG/M2 | HEART RATE: 102 BPM

## 2023-11-03 DIAGNOSIS — Z87.898 HISTORY OF SEIZURES: ICD-10-CM

## 2023-11-03 DIAGNOSIS — E66.9 CLASS 1 OBESITY WITH BODY MASS INDEX (BMI) OF 31.0 TO 31.9 IN ADULT, UNSPECIFIED OBESITY TYPE, UNSPECIFIED WHETHER SERIOUS COMORBIDITY PRESENT: ICD-10-CM

## 2023-11-03 DIAGNOSIS — I10 ESSENTIAL HYPERTENSION: ICD-10-CM

## 2023-11-03 DIAGNOSIS — Z87.820 HISTORY OF TRAUMATIC BRAIN INJURY: ICD-10-CM

## 2023-11-03 DIAGNOSIS — R29.818 SUSPECTED SLEEP APNEA: Primary | ICD-10-CM

## 2023-11-03 DIAGNOSIS — F10.11 HISTORY OF ALCOHOL ABUSE: ICD-10-CM

## 2023-11-03 DIAGNOSIS — G47.19 EXCESSIVE DAYTIME SLEEPINESS: ICD-10-CM

## 2023-11-03 DIAGNOSIS — R06.83 SNORING: ICD-10-CM

## 2023-11-03 PROCEDURE — G0463 HOSPITAL OUTPT CLINIC VISIT: HCPCS

## 2023-11-03 NOTE — PROGRESS NOTES
[facility]  [address] [place], [place] [id number]  [place]   [place] [address]  Phone: [phone]  Fax: [phone]      Agustin JACK Enoc  [id number]   1975  47 y.o.  male    Historians in today's encounter: Patient and  Kina (Wife)  Prior to the onset of the encounter I made sure that the patient provided verbal consent to me to discuss all of the patient's medical information to include any sensitive information/topics in front of the above noted individual also in the room. The patient insisted the above individual stay in the room for entire the encounter to completion.         Referring physician/provider Dr. Sheng Hyatt   PCP Indy Moffett APRN    Type of service: Initial Sleep Medicine Consult.  Date of service: 11/3/2023      Chief Complaint   Patient presents with    Snoring       History of present illness;  The patient was seen today on 11/3/2023 at [facility].    Thank you for asking to see Agustin Stubbs, 47 y.o. PMHx of HTN, Traumatic Brain Injury 2016, Seizures, Alcohol abuse, GERD. The patient presents for initial evaluation of sleep sleep disordered breathing.  Patient  denies prior surgery namely tonsillectomy, nasal surgery or UPPP.     -Patient has been told snoring and witnessed apneas by multiple individuals duration of symptoms >1 year   -Wife states snoring has improved since patient stopped drinking alcohol completely 1 month ago.    -Patient states he is status post recent hospital discharge for Alcohol Withdrawal at that time noted acute respiratory fialure/ status epilepticus / placed on CIWA  -No seizures since leaving hospital   -States he was seen by his neurologist and told continue Keppra as uncertain if seizures were due to alcohol or other etiology such as hx of traumatic brain injury in 2016  -Has stopped drinking alcohol for 1 month   -Snoring has improved since leaving hospital and stopping drinking alcohol   Never had sleep study  before     Obstructive Sleep Apnea Screening: STOP-BANG Sleep Apnea Questionnaire. Reference: Derrick SEYMOUR et al. Br J Anaesth, 2012.     Criterion    Yes    No  Do you SNORE loudly?   [x]   Yes  []   No   Do you often feel TIRED, fatigued, or sleepy during the day?    [x]   Yes  []   No  Has anyone OBSERVED you stop breathing during your sleep?    [x]   Yes  []   No  Do you have or are you being treated for high blood PRESSURE?    [x]   Yes  []   No  BMI >32 kg/m2     []   Yes  [x]   No  AGE > 50 years    []   Yes  [x]   No  NECK circumference >16 inches / 40 cm    []   Yes  [x]   No  GENDER: male     [x]   Yes  []   No    LISSA Probability:  []   1-2 - Low  []   3-4 - Intermediate  [x]   5-8 - High      Further Sleep History:    Bedtime: Weekdays 9 PM to 10 PM; weekends 11 PM  Rise Time: Weekdays 4 AM; weekends 8 AM  Sleep Latency: Less than 20 minutes  Screens in bed: Yes  Wake after sleep onset: Twice  Reasons for awakenings: Nocturia  Number of naps per day none daily 15 minutes  Naps restorative: Sometimes  Caffeine use: 3 coffees per day    RLS Symptoms: No   Bruxism:No   Current sleep related gastroesophageal reflux symptoms:  No   Cataplexy:  No   Sleep Paralysis:  No   Hypnagogic or hypnopompic hallucinations: No   Parasomnias such as sleep walking or sleep eating No     Disclaimer Sleep History: The above sleep history is based on this sleep physician's in room encounter with the patient. Pre encounter self administered questionnaires are taken into consideration and discussed with patient for any discordance. The above documentation by this sleep physician is the most accurate clinical information determined by in room sleep physician encounter with patient.     MEDICAL CONDITIONS (PMH)   HTN  Traumatic brain injury  Seizures  Alcohol abuse  GERD    Social history:  Do you drive a commercial vehicle:  No   Shift work:  No   Tobacco use: Yes 1 PPD since age 15  Alcohol use: Former 70/week, no alcohol 1  "month  Occupation: [employer]    Family Hx (parents and siblings) (pertaining to sleep medicine)  Patient unable to provide    Medications: reviewed    Review of systems is negative unless otherwise noted per HPI   Disclaimer History: The above history is based on this sleep physician's in room encounter with the patient. Pre encounter self administered questionnaires are taken into consideration and discussed with patient for any discordance. The above documentation by this sleep physician is the most accurate clinical information determined by in room sleep physician encounter with patient.     Physical exam:  Vitals:    11/03/23 0700   BP: 142/83   Pulse: 102   SpO2: 96%   Weight: 94.7 kg (208 lb 11.2 oz)   Height: 165.1 cm (65\")    Body mass index is 34.73 kg/m².   CONSTITUTIONAL:  Non-toxic, In no overt distress   Head: normocephalic   ENT: Mallampati class II,+ macroglossia, no septal defects   NECK:Neck Circumference: 15 inches,no nuchal rigidity  RESPIRATORY SYSTEM: Breath sounds are clear (no rales, no rhonchi, no wheezes), no accessory muscle use  CARDIOVASULAR SYSTEM: Heart sounds are regular rhythm and normal rate, no rub, no gallop, no edema  NEUROLOGICAL SYSTEM: Oriented x 3, No gross focal deficits   PSYCHIATRIC SYSTEM: Goal oriented, affect full range appropriate      Office notes from care team reviewed:    -10/1/2023 progress note Dr. Sheng Hyatt  -10/2/2023 progress note Dr. Sheng Hyatt  -10/3/2023 progress note Dr. Sheng Hyatt  -10/4/2023 progress note Dr. Sheng Hyatt  -10/6/2023 Discharge Summary Dr. Yoshi Perdomo status epilepticus       Labs reviewed.  TSH          2/9/2023    12:12 9/27/2023    04:42 9/27/2023    15:28   TSH   TSH 0.668  1.760  0.457           Imaging/Diagnostics reviewed:       -9/27/2023 CT Head without contrast Radiologist's impression:  No acute intracranial hemorrhage is seen.  No acute infarct.  No significant interval   change is appreciated since " the 09/01 2019 CT study with bifrontal and bi-temporal, left greater   than right, posttraumatic encephalomalacia suspected  See full report for further details      Assessment and plan:  Suspected sleep apnea [R29.818] patient's symptoms and examination is consistent with sleep apnea (G47.30). I have talked to the patient about the signs and symptoms of sleep apnea. In addition, I have also discussed pathophysiology of sleep apnea.  I also discussed the complications of untreated sleep apnea including effects on hypertension, diabetes mellitus and nonrestorative sleep with hypersomnia which can increase risk for motor vehicle accidents.  Untreated sleep apnea is also a risk factor for development of atrial fibrillation, hypertension, insulin resistance and cerebrovascular accident.  Discussed in detail of various testing methods including home-based and lab based sleep studies.  Based on history and physical examination and other comorbidities the most appropriate study is to order SPLIT AHI > 15 in laboratory polysomnography, rather than home sleep apnea testing,to rule out diagnosis of sleep apnea per AASM clinical practice guidelines (doi:10.5664/jcsm.6506) secondary to patient’s history of traumatic brain injury and history of seizures.  The order for the sleep study is placed in [facility].  The test will be scheduled after approval from insurance. Treatment and management will be discussed after the test is completed.  Patient was given opportunity to ask questions and all the questions were answered.   Snoring (R06.83), snoring is the sound created by turbulent airflow vibrating upper airway soft tissue due to limitation of inspiratory airflow. I have also discussed factors affecting snoring including sleep deprivation, sleeping on the back and alcohol ingestion. To minimize snoring, patient is advised to have adequate sleep, sleep on the side and avoid alcohol and sedative medications before bedtime  Excessive  daytime sleepiness .  Patient endorses subjective excessive daytime sleepiness with sleep physician encounter which was consistent with patient's pre-encounter self-administered [place] Sleepiness Scale of Total score: 8.  There are many causes for daytime excessive sleepiness including sleep depression, shiftwork syndrome, depression and other medical disorders including heart, kidney and liver failure.  The most serious cause of excessive sleepiness is due to neurological conditions like narcolepsy/cataplexy.  But the most common cause of excessive sleepiness is due to sleep apnea with frequent awakenings during sleep time.  I have discussed safety of driving and to remain vigilant while driving.  Obesity, patient's BMI is Body mass index is 34.73 kg/m².. I have discussed the relationship between weight and sleep apnea.There is direct correlation between weight and severity of sleep apnea.  Weight reduction is encouraged, as it is going to reduce the severity of sleep apnea. I have also discussed with the patient diet and exercise to achieve ideal body weight.  History of traumatic brain injury,  in lab sleep study as stated above. Follow up with PCP.  History of seizures, in lab sleep study as stated above. Follow up with PCP.  History of alcohol abuse, Reports no alcohol for 1 month. Follow up with PCP for continued management and monitoring. Counseled to avoid alcohol before bed. Alcohol has a significant effect on sleep architecture. At all dosages, it reduces sleep onset latency, consolidates the first half of sleep and increases sleep disruption during the second half of the night.  Additionally, alcohol may induce apneas even in asymptomatic snorers. (DOI 49(4):299-310)  HTN, Follow up with primary care physician for continued management. This medical condition would make the patient eligible for a trial of PAP therapy even if sleep study reveals mild severity sleep apnea.      I have also discussed with  the patient the following  Sleep hygiene: Maintaining a regular bedtime and wake time, not to watch television or work in bed, limit caffeine-containing beverages before bed time and avoid naps during the day  Adequate amount of sleep.  Generally most people needs about 7 to 8 hours of sleep.      Return for 31 to 90 days after PAP setup with down load.  Patient's questions were answered      I once again thank you for asking me to see this patient in consultation and I have forwarded my opinion and treatment plan.  Please do not hesitate to call me if you have any questions.       EMR Dragon/Transcription disclaimer:   Much of this encounter note is an electronic transcription/translation of spoken language to printed text. The electronic translation of spoken language may permit erroneous, or at times, nonsensical words or phrases to be inadvertently transcribed; Although I have reviewed the note for such errors, some may still exist.     NPI #: [id number]    Radha Eli, DO  Sleep Medicine  [facility]  11/03/23

## 2023-11-26 DIAGNOSIS — G40.909 SEIZURE DISORDER: Primary | ICD-10-CM

## 2023-11-27 RX ORDER — LEVETIRACETAM 1000 MG/1
1000 TABLET ORAL EVERY 12 HOURS
Qty: 60 TABLET | Refills: 0 | Status: SHIPPED | OUTPATIENT
Start: 2023-11-27

## 2023-11-27 RX ORDER — METOPROLOL SUCCINATE 25 MG/1
25 TABLET, EXTENDED RELEASE ORAL
Qty: 30 TABLET | Refills: 0 | Status: SHIPPED | OUTPATIENT
Start: 2023-11-27 | End: 2023-12-27

## 2023-12-27 RX ORDER — METOPROLOL SUCCINATE 25 MG/1
25 TABLET, EXTENDED RELEASE ORAL
Qty: 30 TABLET | Refills: 0 | Status: SHIPPED | OUTPATIENT
Start: 2023-12-27 | End: 2024-01-26

## 2024-01-25 RX ORDER — METOPROLOL SUCCINATE 25 MG/1
25 TABLET, EXTENDED RELEASE ORAL
Qty: 30 TABLET | Refills: 0 | Status: SHIPPED | OUTPATIENT
Start: 2024-01-25 | End: 2024-02-24

## 2024-01-27 DIAGNOSIS — I10 ESSENTIAL HYPERTENSION: ICD-10-CM

## 2024-01-29 RX ORDER — LISINOPRIL AND HYDROCHLOROTHIAZIDE 12.5; 1 MG/1; MG/1
1 TABLET ORAL DAILY
Qty: 90 TABLET | Refills: 1 | Status: SHIPPED | OUTPATIENT
Start: 2024-01-29

## 2024-02-08 ENCOUNTER — OFFICE VISIT (OUTPATIENT)
Dept: FAMILY MEDICINE CLINIC | Facility: CLINIC | Age: 49
End: 2024-02-08
Payer: COMMERCIAL

## 2024-02-08 VITALS
BODY MASS INDEX: 35.99 KG/M2 | WEIGHT: 216 LBS | HEIGHT: 65 IN | HEART RATE: 97 BPM | SYSTOLIC BLOOD PRESSURE: 128 MMHG | DIASTOLIC BLOOD PRESSURE: 74 MMHG | OXYGEN SATURATION: 97 %

## 2024-02-08 DIAGNOSIS — Z12.11 SCREENING FOR COLON CANCER: ICD-10-CM

## 2024-02-08 DIAGNOSIS — Z13.29 SCREENING FOR THYROID DISORDER: ICD-10-CM

## 2024-02-08 DIAGNOSIS — K21.9 GASTROESOPHAGEAL REFLUX DISEASE WITHOUT ESOPHAGITIS: ICD-10-CM

## 2024-02-08 DIAGNOSIS — G40.909 SEIZURE DISORDER: ICD-10-CM

## 2024-02-08 DIAGNOSIS — Z00.00 ANNUAL PHYSICAL EXAM: Primary | ICD-10-CM

## 2024-02-08 DIAGNOSIS — Z13.220 SCREENING FOR CHOLESTEROL LEVEL: ICD-10-CM

## 2024-02-08 DIAGNOSIS — Z12.5 SCREENING FOR PROSTATE CANCER: ICD-10-CM

## 2024-02-08 DIAGNOSIS — I10 PRIMARY HYPERTENSION: ICD-10-CM

## 2024-02-08 NOTE — PROGRESS NOTES
Chief Complaint  Hypertension, Heartburn, Seizures (/), and Annual Exam    Subjective            Agustin Stubbs presents to [facility]  History of Present Illness  Pt is an annual CPE for GERD and HTN.     Pt reports Dr. Wong is trying to wean pt off keppra and change to anti-depressant, but is unsure what the medication is.     Pt is due labs/orders placed.    Pt is followed by Dr. Wong, Neurology due to seizures. Pt would like to be referred to a different neurologist for second opinion/ referral placed to Dr. Wright.    Colon:12/20/21, every 3 yrs.     Pt is due pcv20 and tdap vaccines. PT declines and understands the risks of not having.    CXR:  10/2023        Past Medical History:   Diagnosis Date    Acid reflux     Chronic allergic rhinitis     Head injury     High blood pressure     Hyperlipemia     Hypertension     No pertinent past medical history     Seizures        Allergies   Allergen Reactions    Penicillins Unknown - High Severity        Past Surgical History:   Procedure Laterality Date    COLONOSCOPY      COLONOSCOPY N/A 12/20/2021    Procedure: COLONOSCOPY with possible biopies.;  Surgeon: Aaron Perla MD;  Location: [facility] ENDOSCOPY;  Service: General;  Laterality: N/A;  COLON POLYPS        Social History     Tobacco Use    Smoking status: Every Day     Packs/day: .25     Types: Cigarettes     Passive exposure: Current    Smokeless tobacco: Never    Tobacco comments:     CURRENT EVERY DAY SMOKER, SMOKED 21-30 YEARS   Substance Use Topics    Alcohol use: Yes     Comment: 4 DRINKS PER DAY, HAS BEEN DRINKING FOR 21-30 YEARS       Family History   Problem Relation Age of Onset    Cancer Mother     Diabetes Mother     Other Father         RENAL CALCULUS    Diabetes Father     Cancer Brother     Breast cancer Other 30        AUNT    Lung cancer Other         UNCLE    Malig Hyperthermia Neg Hx         Current Outpatient Medications on File Prior to Visit  "  Medication Sig    fluticasone (FLONASE) 50 MCG/ACT nasal spray 2 sprays into the nostril(s) as directed by provider Daily.    levETIRAcetam (KEPPRA) 1000 MG tablet TAKE ONE TABLET BY MOUTH EVERY 12 HOURS    lisinopril-hydrochlorothiazide (PRINZIDE,ZESTORETIC) 10-12.5 MG per tablet TAKE 1 TABLET BY MOUTH DAILY    metoprolol succinate XL (TOPROL-XL) 25 MG 24 hr tablet TAKE 1 TABLET BY MOUTH DAILY    mupirocin (BACTROBAN) 2 % cream Apply 1 application  topically to the appropriate area as directed 3 (Three) Times a Day.    omeprazole (priLOSEC) 40 MG capsule Take 1 capsule by mouth Daily. (Patient taking differently: Take 1 capsule by mouth Daily. Pt states that he takes it PRN)     No current facility-administered medications on file prior to visit.       Health Maintenance Due   Topic Date Due    ANNUAL PHYSICAL  02/06/2024       Objective     /74   Pulse 97   Ht 165.1 cm (65\")   Wt 98 kg (216 lb)   SpO2 97%   BMI 35.94 kg/m²       Physical Exam  Constitutional:       General: He is not in acute distress.     Appearance: Normal appearance. He is not ill-appearing.   HENT:      Head: Normocephalic and atraumatic.      Right Ear: Tympanic membrane, ear canal and external ear normal.      Left Ear: Tympanic membrane, ear canal and external ear normal.      Nose: Nose normal.   Eyes:      Extraocular Movements: Extraocular movements intact.   Cardiovascular:      Rate and Rhythm: Normal rate and regular rhythm.      Heart sounds: Normal heart sounds. No murmur heard.  Pulmonary:      Effort: Pulmonary effort is normal. No respiratory distress.      Breath sounds: Normal breath sounds.   Chest:      Chest wall: No tenderness.   Abdominal:      General: Abdomen is flat. Bowel sounds are normal. There is no distension.      Palpations: Abdomen is soft. There is no mass.      Tenderness: There is no abdominal tenderness. There is no guarding.   Musculoskeletal:         General: No swelling or tenderness. Normal " range of motion.      Cervical back: Normal range of motion and neck supple.   Skin:     General: Skin is warm and dry.      Findings: No rash.   Neurological:      General: No focal deficit present.      Mental Status: He is alert and oriented to person, place, and time. Mental status is at baseline.      Gait: Gait normal.   Psychiatric:         Mood and Affect: Mood normal.         Behavior: Behavior normal.         Thought Content: Thought content normal.         Judgment: Judgment normal.       Class 2 Severe Obesity (BMI >=35 and <=39.9). Obesity-related health conditions include the following: hypertension and GERD. Obesity is unchanged. BMI is is above average; BMI management plan is completed. We discussed portion control and increasing exercise.     Result Review :                           Assessment and Plan        Diagnoses and all orders for this visit:    1. Annual physical exam (Primary)  -     CBC w AUTO Differential; Future  -     Comprehensive metabolic panel; Future  -     Lipid panel; Future  -     TSH; Future  -     PSA SCREENING; Future  -     Ambulatory Referral For Screening Colonoscopy    2. Seizure disorder  Comments:  stableon Keprra 100mg, continue, pt to continue f/u with Neurology for mgmt, pt requests a second opion with a different Neurologsit, referral placed  Orders:  -     Ambulatory Referral to Neurology    3. Primary hypertension  Comments:  stable on Zestoretic 10/12.5mg, continue  Orders:  -     CBC w AUTO Differential; Future    4. Gastroesophageal reflux disease without esophagitis  Comments:  stable on Prilosec 40mg, continue    5. Screening for cholesterol level  -     Comprehensive metabolic panel; Future  -     Lipid panel; Future    6. Screening for thyroid disorder  -     TSH; Future    7. Screening for prostate cancer  -     PSA SCREENING; Future    8. Screening for colon cancer  -     Ambulatory Referral For Screening Colonoscopy      Preventative Counseling  Healthy  diet  Daily exercise  Get adequate sleep        Follow Up     Return in about 6 months (around 8/8/2024).    Patient was given instructions and counseling regarding his condition or for health maintenance advice. Please see specific information pulled into the AVS if appropriate.     Agustin JACK Enoc  reports that he has been smoking cigarettes. He has been smoking an average of .25 packs per day. He has been exposed to tobacco smoke. He has never used smokeless tobacco.. I have educated him on the risk of diseases from using tobacco products such as cancer, COPD, and heart disease.     I advised him to quit and he is not willing to quit.    I spent 3  minutes counseling the patient.

## 2024-02-23 RX ORDER — METOPROLOL SUCCINATE 25 MG/1
25 TABLET, EXTENDED RELEASE ORAL
Qty: 30 TABLET | Refills: 0 | Status: SHIPPED | OUTPATIENT
Start: 2024-02-23 | End: 2024-03-24

## 2024-03-27 RX ORDER — METOPROLOL SUCCINATE 25 MG/1
25 TABLET, EXTENDED RELEASE ORAL
Qty: 90 TABLET | Refills: 1 | Status: SHIPPED | OUTPATIENT
Start: 2024-03-27

## 2024-04-08 ENCOUNTER — OFFICE VISIT (OUTPATIENT)
Dept: NEUROLOGY | Facility: CLINIC | Age: 49
End: 2024-04-08
Payer: COMMERCIAL

## 2024-04-08 VITALS
BODY MASS INDEX: 36.82 KG/M2 | SYSTOLIC BLOOD PRESSURE: 156 MMHG | HEART RATE: 101 BPM | HEIGHT: 65 IN | DIASTOLIC BLOOD PRESSURE: 98 MMHG | WEIGHT: 221 LBS

## 2024-04-08 DIAGNOSIS — R56.9 GENERALIZED CONVULSIVE SEIZURES: Primary | ICD-10-CM

## 2024-04-08 PROCEDURE — 99204 OFFICE O/P NEW MOD 45 MIN: CPT | Performed by: PSYCHIATRY & NEUROLOGY

## 2024-04-08 RX ORDER — LACOSAMIDE 150 MG/1
TABLET ORAL
Qty: 60 TABLET | Refills: 5 | Status: SHIPPED | OUTPATIENT
Start: 2024-04-08

## 2024-04-08 RX ORDER — LACOSAMIDE 100 MG/1
TABLET ORAL
Qty: 14 TABLET | Refills: 0 | Status: SHIPPED | OUTPATIENT
Start: 2024-04-08

## 2024-04-08 RX ORDER — LACOSAMIDE 50 MG/1
TABLET ORAL
Qty: 14 TABLET | Refills: 0 | Status: SHIPPED | OUTPATIENT
Start: 2024-04-08

## 2024-04-08 NOTE — PROGRESS NOTES
"Chief Complaint  Seizures (2nd opinion- Dr. Wong. EEG/MRI completed 09/2023)    Subjective          Agustin Stubbs is a 48 y.o. male who presents to [facility]  History of Present Illness  48-year-old man evaluated for a seizure that occurred 9/27/2023 and he was admitted to [facility] and was discharged on 10/6/2023.  The final diagnosis is status epilepticus, traumatic brain injury, delirium, alcohol withdrawal, hypoxia, essential hypertension.  He is back to normal.  He was seeing another neurologist in the past and was taking Keppra which gave him significant amount of mood swings, irritability and anger and he was also switched to Lamictal.  He stopped taking both of those medications about a month ago.  He states that he was taking Keppra for about 2 months and lamotrigine for about a month.  He also stopped drinking alcohol about that time when he had a seizure.  He and his wife decided to quit drinking alcohol cold turkey.  He had bleeding in the right frontal lobe and left frontal lobe in 2017 and etiology is undetermined.  He does not know if he had trauma to his forehead since he was drinking so much at that time but he did go to work for a week before he was found to have intracranial bleeding.  He had an MRI of the brain that was performed and September 30 before transferring to [place] and it showed that there is encephalomalacia noted within the frontal lobes bilaterally left greater than right.    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  He works in [place].  He lives about an hour away.    Objective   Vital Signs:   /98 (BP Location: Right arm, Patient Position: Sitting, Cuff Size: Adult)   Pulse 101   Ht 165.1 cm (65\")   Wt 100 kg (221 lb)   BMI 36.78 kg/m²     Physical Exam   Alert, fluent, " phasic, follows commands well.  Visual fields are full, EMs full directions gaze, facial strength is full, soft palate elevation and tongue are normal.  There is no weakness of the upper or lower extremities.  Fine finger movements are intact.  Reflexes are symmetrically decreased.  Station and gait is unremarkable.  Heart is regular and rhythm normal in rate.        Assessment and Plan  Diagnoses and all orders for this visit:    1. Generalized convulsive seizures (Primary)  Assessment & Plan:  I discussed with him that there is a high likely possibility of a seizure recurrence given that his seizure occurred in the morning hours while he was sleeping, he has frontal lobe abnormality noted on MRI when the previous intracranial bleed, the seizure was status epilepticus.  It was attributed to alcohol withdrawal however he states that he was drinking alcohol the night prior to the seizure which makes it unlikely.  Also did not undergo withdrawals.    I discussed with him that I would recommend for him to take antiepileptic medication however it is totally up to him.  I discussed with him that with antiepileptic medication he lowers his risk of seizure recurrence although it may still occur.  He is aware of [place] driving laws as well as precautions to undertake in the event that he has another seizure.  I will start him on Vimpat at 50 mg twice a day for 1 week, 100 mg twice a day the second week and then 150 mg twice a day to the third week and on.  I read to him the adverse effects according to the literature.  I will see him again in 3 months time for follow-up.  Thank for letting me participate in his care.      Orders:  -     lacosamide (VIMPAT) 50 MG tablet tablet; 1 tablet twice a day first week  Dispense: 14 tablet; Refill: 0  -     lacosamide (VIMPAT) 100 MG tablet tablet; 1 tablet twice a day second week.  Dispense: 14 tablet; Refill: 0  -     lacosamide 150 MG tablet; 1 tablet twice a day third week and  on  Dispense: 60 tablet; Refill: 5         Total time spent with the patient and coordinating patient care was 45 minutes.    Follow Up  No follow-ups on file.  Patient was given instructions and counseling regarding his condition or for health maintenance advice. Please see specific information pulled into the AVS if appropriate.

## 2024-04-08 NOTE — ASSESSMENT & PLAN NOTE
I discussed with him that there is a high likely possibility of a seizure recurrence given that his seizure occurred in the morning hours while he was sleeping, he has frontal lobe abnormality noted on MRI when the previous intracranial bleed, the seizure was status epilepticus.  It was attributed to alcohol withdrawal however he states that he was drinking alcohol the night prior to the seizure which makes it unlikely.  Also did not undergo withdrawals.    I discussed with him that I would recommend for him to take antiepileptic medication however it is totally up to him.  I discussed with him that with antiepileptic medication he lowers his risk of seizure recurrence although it may still occur.  He is aware of [place] driving laws as well as precautions to undertake in the event that he has another seizure.  I will start him on Vimpat at 50 mg twice a day for 1 week, 100 mg twice a day the second week and then 150 mg twice a day to the third week and on.  I read to him the adverse effects according to the literature.  I will see him again in 3 months time for follow-up.  Thank for letting me participate in his care.

## 2024-04-29 ENCOUNTER — OFFICE VISIT (OUTPATIENT)
Dept: ORTHOPEDIC SURGERY | Facility: CLINIC | Age: 49
End: 2024-04-29
Payer: COMMERCIAL

## 2024-04-29 VITALS
HEIGHT: 65 IN | OXYGEN SATURATION: 96 % | HEART RATE: 80 BPM | BODY MASS INDEX: 34.16 KG/M2 | SYSTOLIC BLOOD PRESSURE: 154 MMHG | WEIGHT: 205 LBS | DIASTOLIC BLOOD PRESSURE: 84 MMHG

## 2024-04-29 DIAGNOSIS — M25.522 LEFT ELBOW PAIN: Primary | ICD-10-CM

## 2024-04-29 DIAGNOSIS — M77.12 LATERAL EPICONDYLITIS OF LEFT ELBOW: ICD-10-CM

## 2024-04-29 PROCEDURE — 99203 OFFICE O/P NEW LOW 30 MIN: CPT | Performed by: ORTHOPAEDIC SURGERY

## 2024-04-29 PROCEDURE — 20605 DRAIN/INJ JOINT/BURSA W/O US: CPT | Performed by: ORTHOPAEDIC SURGERY

## 2024-04-29 RX ORDER — TRIAMCINOLONE ACETONIDE 40 MG/ML
40 INJECTION, SUSPENSION INTRA-ARTICULAR; INTRAMUSCULAR
Status: COMPLETED | OUTPATIENT
Start: 2024-04-29 | End: 2024-04-29

## 2024-04-29 RX ORDER — LIDOCAINE HYDROCHLORIDE 10 MG/ML
1 INJECTION, SOLUTION INFILTRATION; PERINEURAL
Status: COMPLETED | OUTPATIENT
Start: 2024-04-29 | End: 2024-04-29

## 2024-04-29 RX ADMIN — LIDOCAINE HYDROCHLORIDE 1 ML: 10 INJECTION, SOLUTION INFILTRATION; PERINEURAL at 16:19

## 2024-04-29 RX ADMIN — TRIAMCINOLONE ACETONIDE 40 MG: 40 INJECTION, SUSPENSION INTRA-ARTICULAR; INTRAMUSCULAR at 16:19

## 2024-04-29 NOTE — PROGRESS NOTES
"Chief Complaint  Initial Evaluation of the Left Elbow     Subjective      Agustin Stubbs presents to [facility] for initial evaluation of the left elbow. He has pain in the elbow that comes and goes - Friday was really bad. He states he has been stretching it but it hasn't improved. It has been bothering him for a while. He has had the right elbow injected before for the same thing.     Allergies   Allergen Reactions    Penicillins Unknown - High Severity        Social History     Socioeconomic History    Marital status:    Tobacco Use    Smoking status: Every Day     Current packs/day: 0.25     Types: Cigarettes     Passive exposure: Current    Smokeless tobacco: Never    Tobacco comments:     CURRENT EVERY DAY SMOKER, SMOKED 21-30 YEARS   Vaping Use    Vaping status: Never Used   Substance and Sexual Activity    Alcohol use: Yes     Comment: 4 DRINKS PER DAY, HAS BEEN DRINKING FOR 21-30 YEARS    Drug use: Not Currently     Comment: \"Long time ago party drugs\"    Sexual activity: Defer        I reviewed the patient's chief complaint, history of present illness, review of systems, past medical history, surgical history, family history, social history, medications, and allergy list.     Review of Systems     Constitutional: Denies fevers, chills, weight loss  Cardiovascular: Denies chest pain, shortness of breath  Skin: Denies rashes, acute skin changes  Neurologic: Denies headache, loss of consciousness        Vital Signs:   /84   Pulse 80   Ht 165.1 cm (65\")   Wt 93 kg (205 lb)   SpO2 96%   BMI 34.11 kg/m²          Physical Exam  General: Alert. No acute distress    Ortho Exam        LEFT ELBOW Tender lateral epicondyle. Non-tender medial epicondyle. Non-tender radial head. Sensation to light touch median, radial, ulnar nerve. Positive AIN, PIN, ulnar nerve motor function. Axillary sensation intact. Elbow ROM WNL. Negative Tinel's at the elbow. Pain with resisted " wrist and long finger extension.         Medium Joint Arthrocentesis: L elbow  Date/Time: 4/29/2024 4:19 PM  Procedure Details  Location: elbow - L elbow  Preparation: Patient was prepped and draped in the usual sterile fashion  Needle size: 23 G  Medications administered: 1 mL lidocaine 1 %; 40 mg triamcinolone acetonide 40 MG/ML  Patient tolerance: patient tolerated the procedure well with no immediate complications    This injection documentation was Scribed for Conrado Rasmussen MD by Laurel Hoffman.  04/29/24   16:19 EDT        Imaging Results (Most Recent)       Procedure Component Value Units Date/Time    XR ELBOW 2 VIEW LEFT [[id number]] Resulted: 04/29/24 1600     Updated: 04/29/24 1602             Result Review :     X-Ray Report:  Left elbow X-Ray  Indication: Evaluation of the left elbow  AP/Lateral view(s)  Findings: Mild arthritis  Prior studies available for comparison: no        Assessment and Plan     Diagnoses and all orders for this visit:    1. Left elbow pain (Primary)  -     XR ELBOW 2 VIEW LEFT    2. Lateral epicondylitis of left elbow        Discussed the treatment plan with the patient. I reviewed the X-rays that were obtained today with the patient.     Discussed the risks and benefits of conservative measures. The patient expressed understanding and wished to proceed with a left elbow steroid injection.  He tolerated the injection well.     Educated on risk of smoking/nicotine. Discussed options for smoking cessation. and Call or return if worsening symptoms.    Follow Up     PRN      Patient was given instructions and counseling regarding his condition or for health maintenance advice. Please see specific information pulled into the AVS if appropriate.     Scribed for Conrado Rasmussen MD by Jazlyn Ayala MA.  04/29/24   15:58 EDT      I have personally performed the services described in this document as scribed by the above individual and it is both accurate and complete. Conrado  GARRETT Rasmussen MD 04/29/24

## 2024-07-10 ENCOUNTER — TELEPHONE (OUTPATIENT)
Dept: FAMILY MEDICINE CLINIC | Facility: CLINIC | Age: 49
End: 2024-07-10
Payer: COMMERCIAL

## 2024-07-10 DIAGNOSIS — R53.83 OTHER FATIGUE: Primary | ICD-10-CM

## 2024-07-10 NOTE — TELEPHONE ENCOUNTER
VMTCB @ 1 to both pt and spouse.     What symptoms is pt having to want testosterone levels check?    Urologist also mentioned. Is pt wanting referral? May need to wait until labs are resulted, if ordered, before placing referral.

## 2024-07-10 NOTE — TELEPHONE ENCOUNTER
Caller: MAGI HUGGINS    Relationship: Emergency Contact    Best call back number: [phone]    What is the medical concern/diagnosis: TESTOSTERONE LEVELS     What specialty or service is being requested: UROLOGIST     What is the provider, practice or medical service name: N/A     What is the office location: [facility]     What is the office phone number: N/A

## 2024-07-10 NOTE — TELEPHONE ENCOUNTER
S/W pt. Pt sates he has increased fatigue and no stamina. Pt would like testosterone levels checked. Pt is aware to go first thing in the morning. Will await results before referring to urology.     Please sign orders.     F/U 8/8/24

## 2024-07-16 ENCOUNTER — LAB (OUTPATIENT)
Dept: LAB | Facility: HOSPITAL | Age: 49
End: 2024-07-16
Payer: COMMERCIAL

## 2024-07-16 DIAGNOSIS — I10 PRIMARY HYPERTENSION: ICD-10-CM

## 2024-07-16 DIAGNOSIS — R53.83 OTHER FATIGUE: ICD-10-CM

## 2024-07-16 DIAGNOSIS — Z00.00 ANNUAL PHYSICAL EXAM: ICD-10-CM

## 2024-07-16 DIAGNOSIS — Z13.29 SCREENING FOR THYROID DISORDER: ICD-10-CM

## 2024-07-16 DIAGNOSIS — E78.2 MIXED HYPERLIPIDEMIA: Primary | ICD-10-CM

## 2024-07-16 DIAGNOSIS — Z12.5 SCREENING FOR PROSTATE CANCER: ICD-10-CM

## 2024-07-16 DIAGNOSIS — Z13.220 SCREENING FOR CHOLESTEROL LEVEL: ICD-10-CM

## 2024-07-16 LAB
ALBUMIN SERPL-MCNC: 4.6 G/DL (ref 3.5–5.2)
ALBUMIN/GLOB SERPL: 1.7 G/DL
ALP SERPL-CCNC: 81 U/L (ref 39–117)
ALT SERPL W P-5'-P-CCNC: 29 U/L (ref 1–41)
ANION GAP SERPL CALCULATED.3IONS-SCNC: 11.4 MMOL/L (ref 5–15)
AST SERPL-CCNC: 23 U/L (ref 1–40)
BASOPHILS # BLD AUTO: 0.07 10*3/MM3 (ref 0–0.2)
BASOPHILS NFR BLD AUTO: 0.8 % (ref 0–1.5)
BILIRUB SERPL-MCNC: 0.6 MG/DL (ref 0–1.2)
BUN SERPL-MCNC: 12 MG/DL (ref 6–20)
BUN/CREAT SERPL: 13.5 (ref 7–25)
CALCIUM SPEC-SCNC: 9.4 MG/DL (ref 8.6–10.5)
CHLORIDE SERPL-SCNC: 103 MMOL/L (ref 98–107)
CHOLEST SERPL-MCNC: 286 MG/DL (ref 0–200)
CO2 SERPL-SCNC: 23.6 MMOL/L (ref 22–29)
CREAT SERPL-MCNC: 0.89 MG/DL (ref 0.76–1.27)
DEPRECATED RDW RBC AUTO: 47.6 FL (ref 37–54)
EGFRCR SERPLBLD CKD-EPI 2021: 105.1 ML/MIN/1.73
EOSINOPHIL # BLD AUTO: 0.25 10*3/MM3 (ref 0–0.4)
EOSINOPHIL NFR BLD AUTO: 2.8 % (ref 0.3–6.2)
ERYTHROCYTE [DISTWIDTH] IN BLOOD BY AUTOMATED COUNT: 14.2 % (ref 12.3–15.4)
GLOBULIN UR ELPH-MCNC: 2.7 GM/DL
GLUCOSE SERPL-MCNC: 95 MG/DL (ref 65–99)
HCT VFR BLD AUTO: 52.2 % (ref 37.5–51)
HDLC SERPL-MCNC: 39 MG/DL (ref 40–60)
HGB BLD-MCNC: 17 G/DL (ref 13–17.7)
IMM GRANULOCYTES # BLD AUTO: 0.05 10*3/MM3 (ref 0–0.05)
IMM GRANULOCYTES NFR BLD AUTO: 0.6 % (ref 0–0.5)
LDLC SERPL CALC-MCNC: 188 MG/DL (ref 0–100)
LDLC/HDLC SERPL: 4.81 {RATIO}
LYMPHOCYTES # BLD AUTO: 2.91 10*3/MM3 (ref 0.7–3.1)
LYMPHOCYTES NFR BLD AUTO: 32.7 % (ref 19.6–45.3)
MCH RBC QN AUTO: 29.9 PG (ref 26.6–33)
MCHC RBC AUTO-ENTMCNC: 32.6 G/DL (ref 31.5–35.7)
MCV RBC AUTO: 91.9 FL (ref 79–97)
MONOCYTES # BLD AUTO: 0.79 10*3/MM3 (ref 0.1–0.9)
MONOCYTES NFR BLD AUTO: 8.9 % (ref 5–12)
NEUTROPHILS NFR BLD AUTO: 4.83 10*3/MM3 (ref 1.7–7)
NEUTROPHILS NFR BLD AUTO: 54.2 % (ref 42.7–76)
NRBC BLD AUTO-RTO: 0 /100 WBC (ref 0–0.2)
PLATELET # BLD AUTO: 293 10*3/MM3 (ref 140–450)
PMV BLD AUTO: 8.8 FL (ref 6–12)
POTASSIUM SERPL-SCNC: 4.3 MMOL/L (ref 3.5–5.2)
PROT SERPL-MCNC: 7.3 G/DL (ref 6–8.5)
PSA SERPL-MCNC: 0.91 NG/ML (ref 0–4)
RBC # BLD AUTO: 5.68 10*6/MM3 (ref 4.14–5.8)
SODIUM SERPL-SCNC: 138 MMOL/L (ref 136–145)
TRIGL SERPL-MCNC: 298 MG/DL (ref 0–150)
TSH SERPL DL<=0.05 MIU/L-ACNC: 1.14 UIU/ML (ref 0.27–4.2)
VLDLC SERPL-MCNC: 59 MG/DL (ref 5–40)
WBC NRBC COR # BLD AUTO: 8.9 10*3/MM3 (ref 3.4–10.8)

## 2024-07-16 PROCEDURE — 85025 COMPLETE CBC W/AUTO DIFF WBC: CPT

## 2024-07-16 PROCEDURE — 80061 LIPID PANEL: CPT

## 2024-07-16 PROCEDURE — 84443 ASSAY THYROID STIM HORMONE: CPT

## 2024-07-16 PROCEDURE — 84402 ASSAY OF FREE TESTOSTERONE: CPT

## 2024-07-16 PROCEDURE — G0103 PSA SCREENING: HCPCS

## 2024-07-16 PROCEDURE — 84403 ASSAY OF TOTAL TESTOSTERONE: CPT

## 2024-07-16 PROCEDURE — 80053 COMPREHEN METABOLIC PANEL: CPT

## 2024-07-16 PROCEDURE — 36415 COLL VENOUS BLD VENIPUNCTURE: CPT

## 2024-07-16 RX ORDER — FENOFIBRATE 145 MG/1
145 TABLET, COATED ORAL DAILY
Qty: 90 TABLET | Refills: 1 | Status: SHIPPED | OUTPATIENT
Start: 2024-07-16

## 2024-07-16 RX ORDER — ROSUVASTATIN CALCIUM 10 MG/1
10 TABLET, COATED ORAL DAILY
Qty: 90 TABLET | Refills: 1 | Status: SHIPPED | OUTPATIENT
Start: 2024-07-16

## 2024-07-19 LAB
TESTOST FREE SERPL-MCNC: 7.2 PG/ML (ref 6.8–21.5)
TESTOST SERPL-MCNC: 409 NG/DL (ref 264–916)

## 2024-08-21 ENCOUNTER — OFFICE VISIT (OUTPATIENT)
Dept: ORTHOPEDIC SURGERY | Facility: CLINIC | Age: 49
End: 2024-08-21
Payer: COMMERCIAL

## 2024-08-21 VITALS
WEIGHT: 205 LBS | DIASTOLIC BLOOD PRESSURE: 80 MMHG | BODY MASS INDEX: 34.16 KG/M2 | SYSTOLIC BLOOD PRESSURE: 117 MMHG | HEART RATE: 80 BPM | HEIGHT: 65 IN | OXYGEN SATURATION: 96 %

## 2024-08-21 DIAGNOSIS — M25.562 LEFT KNEE PAIN, UNSPECIFIED CHRONICITY: Primary | ICD-10-CM

## 2024-08-21 DIAGNOSIS — M76.52 PATELLAR TENDONITIS OF LEFT KNEE: ICD-10-CM

## 2024-08-21 PROCEDURE — 99213 OFFICE O/P EST LOW 20 MIN: CPT | Performed by: ORTHOPAEDIC SURGERY

## 2024-08-21 NOTE — PROGRESS NOTES
"Chief Complaint  Initial Evaluation of the Left Knee     Subjective      Agustin Stubbs presents to [facility] for initial evaluation of the left knee. He starting having buckling of the left knee on uneven ground.  He ambulates a lot then sits long distance to drive home.  His pain is deep in the patella.  He is tender over the patella tendon. He has difficulty at times with going down the stairs.     Allergies   Allergen Reactions    Penicillins Unknown - High Severity        Social History     Socioeconomic History    Marital status:    Tobacco Use    Smoking status: Every Day     Current packs/day: 0.25     Types: Cigarettes     Passive exposure: Current    Smokeless tobacco: Never    Tobacco comments:     CURRENT EVERY DAY SMOKER, SMOKED 21-30 YEARS   Vaping Use    Vaping status: Never Used   Substance and Sexual Activity    Alcohol use: Yes     Comment: 4 DRINKS PER DAY, HAS BEEN DRINKING FOR 21-30 YEARS    Drug use: Not Currently     Comment: \"Long time ago party drugs\"    Sexual activity: Defer        I reviewed the patient's chief complaint, history of present illness, review of systems, past medical history, surgical history, family history, social history, medications, and allergy list.     Review of Systems     Constitutional: Denies fevers, chills, weight loss  Cardiovascular: Denies chest pain, shortness of breath  Skin: Denies rashes, acute skin changes  Neurologic: Denies headache, loss of consciousness        Vital Signs:   /80   Pulse 80   Ht 165.1 cm (65\")   Wt 93 kg (205 lb)   SpO2 96%   BMI 34.11 kg/m²          Physical Exam  General: Alert. No acute distress    Ortho Exam        LEFT KNEE Flexion 125. Extension 0. Stable to varus/valgus stress. Stable to anterior/posterior drawer. Neurovascularly intact. Negative Chris. Negative Lachman. Positive EHL, FHL, HS and TA. Sensation intact to light touch all 5 nerves of the foot. Ambulates with " Antalgic gait. Patella is well tracking. Calf supple, non-tender. Negative tenderness to the medial joint line. Negative tenderness to the lateral joint line. Negative Crepitus. Good strength to hamstrings, quadriceps, dorsiflexors, and plantar flexors.  Knee Extensor Mechanism intact  tender over the patella tendon.       Procedures        Imaging Results (Most Recent)       Procedure Component Value Units Date/Time    XR Knee 3 View Left [[id number]] Resulted: 08/21/24 1400     Updated: 08/21/24 1404             Result Review :     X-Ray Report:  Left knee X-Ray  Indication: Evaluation of the left knee  AP/Lateral and [place] view(s)  Findings: Mild degenerative changes.    Prior studies available for comparison: no             Assessment and Plan     Diagnoses and all orders for this visit:    1. Left knee pain, unspecified chronicity (Primary)  -     XR Knee 3 View Left    2. Patellar tendonitis of left knee        Discussed the treatment plan with the patient. I reviewed the X-rays that were obtained today with the patient.     HEP exercises.  Prescribed physical therapy.     Discussed injections if therapy is not helpful.     Educated on risk of smoking/nicotine. Discussed options for smoking cessation. and Call or return if worsening symptoms.    Follow Up     PRN      Patient was given instructions and counseling regarding his condition or for health maintenance advice. Please see specific information pulled into the AVS if appropriate.     Scribed for Conrado Rasmussen MD by Jazlyn Ayala MA.  08/21/24   14:02 EDT      I have personally performed the services described in this document as scribed by the above individual and it is both accurate and complete. Conrado Rasmussen MD 08/21/24

## 2024-08-22 ENCOUNTER — TREATMENT (OUTPATIENT)
Dept: PHYSICAL THERAPY | Facility: CLINIC | Age: 49
End: 2024-08-22
Payer: COMMERCIAL

## 2024-08-22 DIAGNOSIS — M25.362 OTHER INSTABILITY, LEFT KNEE: ICD-10-CM

## 2024-08-22 DIAGNOSIS — M25.562 CHRONIC PAIN OF LEFT KNEE: ICD-10-CM

## 2024-08-22 DIAGNOSIS — M76.52 PATELLAR TENDONITIS OF LEFT KNEE: Primary | ICD-10-CM

## 2024-08-22 DIAGNOSIS — G89.29 CHRONIC PAIN OF LEFT KNEE: ICD-10-CM

## 2024-08-22 PROCEDURE — 97530 THERAPEUTIC ACTIVITIES: CPT

## 2024-08-22 PROCEDURE — 97161 PT EVAL LOW COMPLEX 20 MIN: CPT

## 2024-08-22 NOTE — PROGRESS NOTES
"  Physical Therapy Initial Evaluation and Plan of Care                    [facility] [facility] [address] [place] [address]    Patient: Agustin Stubbs   : 1975  Diagnosis/ICD-10 Code:  Patellar tendonitis of left knee [M76.52]  Referring practitioner: Conrado Rasmussen MD  Date of Initial Visit: 2024  Today's Date: 2024  Patient seen for 1 sessions           Subjective Questionnaire: LEFS: 67/80 (83.75% function)    Subjective Evaluation    History of Present Illness  Mechanism of injury: Pt presents to therapy with complains of L knee pain. Pt walks a lot for his job, \"like miles a day.\" About 2 months ago, the knee pain started. This was a gradual occurrence. He did have a knee injury in [employer], but did not have surgery for it or anything. Sometimes going down his front steps in the morning, it feels like the left knee is going to give out. It comes and goes. \"It is not a constant, aggravating thing.\" Pt reports that knee has not ever given out on him before.     Pt has recently seen Dr. Rasmussen recently. Dr. Rasmussen thinks pt has patellar tendonitis. Dr. Rasmussen said he could get a shot in the knee or try therapy. Pt opted to try therapy first.     Pt is a .    Pt denies any numbness and tingling.     PMH: one seizure         Patient Occupation: Pt is a  Pain  At best pain ratin  At worst pain rating: 3  Quality: discomfort, squeezing and tight  Aggravating factors: ambulation, sleeping, prolonged positioning, squatting, stairs, standing, movement and lifting    Patient Goals  Patient goals for therapy: decreased pain, improved balance, increased motion, return to sport/leisure activities, return to work, independence with ADLs/IADLs and increased strength         Objective          Tenderness   Left Knee   Tenderness in the inferior patella and patellar tendon. No tenderness in the lateral joint line, lateral patella, LCL (distal), LCL (proximal), " MCL (distal), MCL (proximal), medial joint line, medial patella, superior patella and tibial tubercle.     Active Range of Motion   Left Knee   Flexion: WFL  Extension: WFL    Right Knee   Flexion: WFL  Extension: WFL    Strength/Myotome Testing     Left Hip   Planes of Motion   Flexion: 5    Right Hip   Planes of Motion   Flexion: 5    Left Knee   Flexion: 5  Extension: 5    Right Knee   Flexion: 5  Extension: 5    Left Ankle/Foot   Dorsiflexion: 5    Right Ankle/Foot   Dorsiflexion: 5    Tests     Left Knee   Negative anterior drawer, lateral Chris and medial Chris.     Additional Tests Details  Discomfort with superior patellar glide on the L       See Exercise, Manual, and Modality Logs for complete treatment.     Assessment & Plan       Assessment  Impairments: abnormal gait, abnormal muscle firing, abnormal or restricted ROM, activity intolerance, impaired balance, impaired physical strength, lacks appropriate home exercise program, pain with function, safety issue and weight-bearing intolerance   Functional limitations: walking, uncomfortable because of pain, moving in bed, standing and stooping   Assessment details: Pt reports to therapy with complaints of chronic L knee pain that has gotten gradually worse over the past couple of months. Subjective information and results of objective testing are consistent with L patellar tendonitis. Pt has associated L knee weakness, instability and other functional deficits (LEFS). Skilled therapy required in order to address the aforementioned impairments so the pt can return to his PLOF at work, which requires him to walk multiple mieles per day.      Prognosis: good    Goals  Plan Goals: KNEE PROBLEMS:     1. The patient has symptoms of the L knee limiting function   LTG 1: 12 weeks:  The patient will report 65% improvement in the L knee since the initial evaluation, in order to allow him to walk multiple miles at work without pain.    STATUS:  New   STG 1a: 6  weeks:  The patient will report 25% improvement in the L knee since the initial evaluation, in order to allow him to walk multiple miles at work without pain.    STATUS:  New    2. The patient has pain of the L knee   LTG 2: 12 weeks: The patient will report L knee pain of 1/10 or better, at its worst in the past week, in order to allow increased tolerance to work activities, such as prolonged walking.    STATUS:  New   STG 2a: 6 weeks: The patient will report L knee pain of 2/10 or better, at its worst in the past week, in order to allow increased tolerance to work activities, such as prolonged walking.    STATUS:  New     3. Mobility: Walking/Moving Around Functional Limitation     LTG 3: 12 weeks:  The patient will demonstrate 95% function or better on the LEFS.    STATUS:  New   STG 3a: 6 weeks:  The patient will demonstrate 8% function or better on the LEFS.      STATUS:  New     Plan  Therapy options: will be seen for skilled therapy services  Planned modality interventions: cryotherapy, electrical stimulation/Russian stimulation and TENS  Planned therapy interventions: balance/weight-bearing training, ADL retraining, soft tissue mobilization, strengthening, stretching, therapeutic activities, joint mobilization, home exercise program, gait training, functional ROM exercises, flexibility, body mechanics training, postural training, neuromuscular re-education and manual therapy  Frequency: 3x week  Duration in weeks: 12  Treatment plan discussed with: patient        Visit Diagnoses:    ICD-10-CM ICD-9-CM   1. Patellar tendonitis of left knee  M76.52 726.64   2. Chronic pain of left knee  M25.562 719.46    G89.29 338.29   3. Other instability, left knee  M25.362 718.86       History # of Personal Factors and/or Comorbidities: LOW (0)  Examination of Body System(s): # of elements: LOW (1-2)  Clinical Presentation: STABLE   Clinical Decision Making: LOW       Timed:         Manual Therapy:    0     mins  35968;      Therapeutic Exercise:    0     mins  49666;     Neuromuscular Malachi:    0    mins  97100;    Therapeutic Activity:     10     mins  31538;     Gait Trainin     mins  90666;     Ultrasound:     0     mins  60881;    Ionto                               0    mins   65746  Self Care                       0     mins   28830  Canalith Repos    0     mins 93368      Un-Timed:  Electrical Stimulation:    0     mins  33824 ( );  Dry Needling     0     mins self-pay  Traction     0     mins 97408  Low Eval     35     Mins  01027  Mod Eval     0     Mins  45591  High Eval                       0     Mins  82213  Re-Eval                           0    mins  52023    Timed Treatment:   10   mins   Total Treatment:     45   mins    PT SIGNATURE: Chante Hoff PT    Electronically signed 2024    [place] License: PT - [id number]      Initial Certification  Certification Period: 2024 thru 2024  I certify that the therapy services are furnished while this patient is under my care.  The services outlined above are required by this patient, and will be reviewed every 90 days.     PHYSICIAN: Conrado Rasmussen MD  NPI: [id number]      DATE:     Please sign and return via fax to [phone]. Thank you, [facility].

## 2024-08-28 ENCOUNTER — TREATMENT (OUTPATIENT)
Dept: PHYSICAL THERAPY | Facility: CLINIC | Age: 49
End: 2024-08-28
Payer: COMMERCIAL

## 2024-08-28 DIAGNOSIS — G89.29 CHRONIC PAIN OF LEFT KNEE: ICD-10-CM

## 2024-08-28 DIAGNOSIS — M25.362 OTHER INSTABILITY, LEFT KNEE: ICD-10-CM

## 2024-08-28 DIAGNOSIS — M76.52 PATELLAR TENDONITIS OF LEFT KNEE: Primary | ICD-10-CM

## 2024-08-28 DIAGNOSIS — M25.562 CHRONIC PAIN OF LEFT KNEE: ICD-10-CM

## 2024-08-28 PROCEDURE — 97110 THERAPEUTIC EXERCISES: CPT

## 2024-08-28 PROCEDURE — 97112 NEUROMUSCULAR REEDUCATION: CPT

## 2024-08-28 PROCEDURE — 97530 THERAPEUTIC ACTIVITIES: CPT

## 2024-08-28 NOTE — PROGRESS NOTES
Outpatient Physical Therapy  [address] [facility], [place] [address]                            Physical Therapy Daily Treatment Note    Patient: Agustin Stubbs   : 1975  Diagnosis/ICD-10 Code:  Patellar tendonitis of left knee [M76.52]  Referring practitioner: Conrado Rasmussen MD  Date of Initial Visit: Type: THERAPY  Noted: 2024  Today's Date: 2024  Patient seen for 2 sessions           Subjective   Agustin Stubbs reports: that he does a lot of pacing at work, usually outside up/down curbs and through grass. Feels like the knee could give out on him at times.    Objective   Fatigue with hip strengthening exercises.    See Exercise, Manual, and Modality Logs for complete treatment.     Assessment/Plan  Agustin still experiencing increased L knee pain, especially after work. Pt tolerated exercises well, increased fatigue with hip strengthening exercises. Pt would benefit from skilled PT to address Range of Motion  and Strength deficits, pain management and any concerns with ADLs.       Progress per Plan of Care         Timed:  Manual Therapy:         mins  12382;  Therapeutic Exercise:    10     mins  19268;     Neuromuscular Malachi:    8    mins  23947;    Therapeutic Activity:     12     mins  32696;     Gait Training:           mins  44817;      Untimed:  Electrical Stimulation:         mins  16906 ( );  Mechanical Traction:         mins  85607;     Timed Treatment:   30   mins   Total Treatment:     30   mins      Electronically signed:     Ariela Murphy PTA  Physical Therapist Assistant  [place] [facility] License #: [id number]

## 2024-09-11 ENCOUNTER — TREATMENT (OUTPATIENT)
Dept: PHYSICAL THERAPY | Facility: CLINIC | Age: 49
End: 2024-09-11
Payer: COMMERCIAL

## 2024-09-11 DIAGNOSIS — G89.29 CHRONIC PAIN OF LEFT KNEE: ICD-10-CM

## 2024-09-11 DIAGNOSIS — M25.362 OTHER INSTABILITY, LEFT KNEE: ICD-10-CM

## 2024-09-11 DIAGNOSIS — M25.562 CHRONIC PAIN OF LEFT KNEE: ICD-10-CM

## 2024-09-11 DIAGNOSIS — M76.52 PATELLAR TENDONITIS OF LEFT KNEE: Primary | ICD-10-CM

## 2024-09-11 NOTE — PROGRESS NOTES
"   Physical Therapy Daily Treatment Note                      [facility] [address] [place] [address]    Patient: Agustin Stubbs   : 1975  Diagnosis/ICD-10 Code:  Patellar tendonitis of left knee [M76.52]  Referring practitioner: Conrado Rasmussen MD  Date of Initial Visit: Type: THERAPY  Noted: 2024  Today's Date: 2024  Patient seen for 3 sessions           Subjective  The patient reported that his knee is feeling \"fair to midlin\" today.     Pt reports that he needs to do his eccentric heel taps more often at home.     Objective   See Exercise, Manual, and Modality Logs for complete treatment.     Assessment/Plan  Pt tolerated today's session well. He did experience some upper patellar discomfort during the first repetition of knee extension, but this went away with multiple repetitions. Pt was able to increase weight level for single leg leg press today, indicating improvements in quadriceps and hamstring strength. Due to patellar tendonitis, therapist implemented scraping of the L patellar and quad tendons today. Pt responded well to this but had some discomfort during. Pt would benefit from continued skilled PT to address range of Motion & strength deficits, pain management and any concerns with ADLs. Continue POC.        Timed:  Manual Therapy:    9     mins  74997;  Therapeutic Exercise:    14     mins  47551;     Neuromuscular Malachi:   0    mins  26154;    Therapeutic Activity:     8     mins  51146;     Gait Trainin     mins  06708;     Aquatics                         0      mins  38740    Un-timed:  Mechanical Traction      0     mins  72283  Dry Needling     0     mins self-pay  Electrical Stimulation:    0     mins  67946 ( );      Timed Treatment:   31   mins   Total Treatment:     31   mins    Chante Hoff PT    Electronically signed 2024    [place] License: PT - [id number]                       "

## 2024-09-19 ENCOUNTER — TREATMENT (OUTPATIENT)
Dept: PHYSICAL THERAPY | Facility: CLINIC | Age: 49
End: 2024-09-19
Payer: COMMERCIAL

## 2024-09-19 DIAGNOSIS — M25.362 OTHER INSTABILITY, LEFT KNEE: ICD-10-CM

## 2024-09-19 DIAGNOSIS — M25.562 CHRONIC PAIN OF LEFT KNEE: ICD-10-CM

## 2024-09-19 DIAGNOSIS — G89.29 CHRONIC PAIN OF LEFT KNEE: ICD-10-CM

## 2024-09-19 DIAGNOSIS — M76.52 PATELLAR TENDONITIS OF LEFT KNEE: Primary | ICD-10-CM

## 2024-09-19 PROCEDURE — 97110 THERAPEUTIC EXERCISES: CPT

## 2024-09-19 PROCEDURE — 97530 THERAPEUTIC ACTIVITIES: CPT

## 2024-10-02 ENCOUNTER — TREATMENT (OUTPATIENT)
Dept: PHYSICAL THERAPY | Facility: CLINIC | Age: 49
End: 2024-10-02
Payer: COMMERCIAL

## 2024-10-02 DIAGNOSIS — G89.29 CHRONIC PAIN OF LEFT KNEE: ICD-10-CM

## 2024-10-02 DIAGNOSIS — M76.52 PATELLAR TENDONITIS OF LEFT KNEE: Primary | ICD-10-CM

## 2024-10-02 DIAGNOSIS — M25.562 CHRONIC PAIN OF LEFT KNEE: ICD-10-CM

## 2024-10-02 DIAGNOSIS — M25.362 OTHER INSTABILITY, LEFT KNEE: ICD-10-CM

## 2024-10-02 PROCEDURE — 97112 NEUROMUSCULAR REEDUCATION: CPT

## 2024-10-02 PROCEDURE — 97110 THERAPEUTIC EXERCISES: CPT

## 2024-10-02 PROCEDURE — 97530 THERAPEUTIC ACTIVITIES: CPT

## 2024-10-02 RX ORDER — METOPROLOL SUCCINATE 25 MG/1
25 TABLET, EXTENDED RELEASE ORAL
Qty: 30 TABLET | Refills: 1 | Status: SHIPPED | OUTPATIENT
Start: 2024-10-02

## 2024-10-02 NOTE — TELEPHONE ENCOUNTER
:    Please schedule pt for further refills.    LOV: 2/8/24    Cx appt: 8/8/24        Beta-Blockers Protocol [id number]/02/2024 06:09 AM   Protocol Details Recent or future visit with authorizing provider    BP under 140/90 in past year

## 2024-10-02 NOTE — PROGRESS NOTES
Physical Therapy Daily Treatment Note                      [place] [facility] [address] [facility] [address]    Patient: Agustin Stubbs   : 1975  Diagnosis/ICD-10 Code:  Patellar tendonitis of left knee [M76.52]  Referring practitioner: Conrado Rasmussen MD  Date of Initial Visit: Type: THERAPY  Noted: 2024  Today's Date: 10/2/2024  Patient seen for 5 sessions           Subjective   The patient reported that his knee still has trouble going down the stairs sometimes.    Objective   See Exercise, Manual, and Modality Logs for complete treatment.     Assessment/Plan  Pt tolerated today's session well. Pt was able to perform backwards ambulation on the treadmill today for the first time. He verbalized that he could feel this in his glutes. Pt is improving with balance and stability, as evidenced by performance on rocker board today. However, he did struggle moderately with squats on the flat part pf the bosu ball. Pt would benefit from continued skilled PT to address range of Motion & strength deficits, pain management and any concerns with ADLs. Continue POC.          Timed:  Manual Therapy:    0     mins  12831;  Therapeutic Exercise:    23     mins  49604;     Neuromuscular Malachi:   8    mins  48787;    Therapeutic Activity:     8     mins  47428;     Gait Trainin     mins  38594;     Aquatics                         0      mins  35518    Un-timed:  Mechanical Traction      0     mins  84162  Dry Needling     0     mins self-pay  Electrical Stimulation:    0     mins  20761 ( );      Timed Treatment:   39   mins   Total Treatment:     39   mins    Chante Hoff PT    Electronically signed 10/2/2024    [place] License: PT - [id number]

## 2024-10-09 ENCOUNTER — TREATMENT (OUTPATIENT)
Dept: PHYSICAL THERAPY | Facility: CLINIC | Age: 49
End: 2024-10-09
Payer: COMMERCIAL

## 2024-10-09 DIAGNOSIS — M25.562 CHRONIC PAIN OF LEFT KNEE: ICD-10-CM

## 2024-10-09 DIAGNOSIS — G89.29 CHRONIC PAIN OF LEFT KNEE: ICD-10-CM

## 2024-10-09 DIAGNOSIS — M25.362 OTHER INSTABILITY, LEFT KNEE: ICD-10-CM

## 2024-10-09 DIAGNOSIS — M76.52 PATELLAR TENDONITIS OF LEFT KNEE: Primary | ICD-10-CM

## 2024-10-09 NOTE — PROGRESS NOTES
Outpatient Physical Therapy  [address] [facility] [address], [place] [address]                            Physical Therapy Daily Treatment Note    Patient: Agustin Stubbs   : 1975  Diagnosis/ICD-10 Code:  Patellar tendonitis of left knee [M76.52]  Referring practitioner: Conrado Rasmussen MD  Date of Initial Visit: Type: THERAPY  Noted: 2024  Today's Date: 10/9/2024  Patient seen for 6 sessions           Subjective   Agustin Stubbs reports: that overall the knee has been doing good, states that he is still hesitant when going down the stairs.     Objective   General fatigue with hip strengthening exercises.    See Exercise, Manual, and Modality Logs for complete treatment.     Assessment/Plan  Agustin progressing as evident by decreased overall L knee pain, although hesitant when going down stairs. Pt tolerated exercises well, just general fatigue. Pt would benefit from skilled PT to address Range of Motion  and Strength deficits, pain management and any concerns with ADLs.       Progress per Plan of Care         Timed:  Manual Therapy:         mins  20467;  Therapeutic Exercise:    10     mins  65174;     Neuromuscular Malachi:    8    mins  87554;    Therapeutic Activity:     12     mins  31197;     Gait Training:           mins  20114;      Untimed:  Electrical Stimulation:         mins  44423 ( );  Mechanical Traction:         mins  83444;     Timed Treatment:   30   mins   Total Treatment:     30   mins      Electronically signed:     Ariela Murphy PTA  Physical Therapist Assistant  [facility] License #: [id number]

## 2024-10-16 ENCOUNTER — TREATMENT (OUTPATIENT)
Dept: PHYSICAL THERAPY | Facility: CLINIC | Age: 49
End: 2024-10-16
Payer: COMMERCIAL

## 2024-10-16 DIAGNOSIS — M25.562 CHRONIC PAIN OF LEFT KNEE: ICD-10-CM

## 2024-10-16 DIAGNOSIS — G89.29 CHRONIC PAIN OF LEFT KNEE: ICD-10-CM

## 2024-10-16 DIAGNOSIS — M76.52 PATELLAR TENDONITIS OF LEFT KNEE: Primary | ICD-10-CM

## 2024-10-16 DIAGNOSIS — M25.362 OTHER INSTABILITY, LEFT KNEE: ICD-10-CM

## 2024-10-16 PROCEDURE — 97530 THERAPEUTIC ACTIVITIES: CPT

## 2024-10-16 PROCEDURE — 97112 NEUROMUSCULAR REEDUCATION: CPT

## 2024-10-16 PROCEDURE — 97110 THERAPEUTIC EXERCISES: CPT

## 2024-10-16 NOTE — PROGRESS NOTES
Outpatient Physical Therapy  [address] [facility] [place] [address]                            Physical Therapy Daily Treatment Note    Patient: Agustin Stubbs   : 1975  Diagnosis/ICD-10 Code:  Patellar tendonitis of left knee [M76.52]  Referring practitioner: Conrado Rasmussen MD  Date of Initial Visit: Type: THERAPY  Noted: 2024  Today's Date: 10/16/2024  Patient seen for 7 sessions           Subjective   Agustin Stubbs reports: that the knee is still a little stiff. States that he is still cautious when he is going down the stairs at home, sometimes the knee feels like it might give out.     Objective   No complaints of increased pain or discomfort.    See Exercise, Manual, and Modality Logs for complete treatment.     Assessment/Plan  Agustin progressing as evident by decreased overall L knee pain, although cautious. Pt tolerated exercises well, no complaints of increased pain or discomfort. Pt would benefit from skilled PT to address Range of Motion  and Strength deficits, pain management and any concerns with ADLs.       Progress per Plan of Care         Timed:  Manual Therapy:         mins  29944;  Therapeutic Exercise:    10     mins  29588;     Neuromuscular Malachi:    8    mins  93968;    Therapeutic Activity:     12     mins  44807;     Gait Training:           mins  44882;      Untimed:  Electrical Stimulation:         mins  18793 ( );  Mechanical Traction:         mins  85529;     Timed Treatment:   30   mins   Total Treatment:     30   mins      Electronically signed:     Ariela Murphy PTA  Physical Therapist Assistant  [facility] License #: [id number]

## 2024-10-23 ENCOUNTER — TREATMENT (OUTPATIENT)
Dept: PHYSICAL THERAPY | Facility: CLINIC | Age: 49
End: 2024-10-23
Payer: COMMERCIAL

## 2024-10-23 ENCOUNTER — TELEPHONE (OUTPATIENT)
Dept: PHYSICAL THERAPY | Facility: CLINIC | Age: 49
End: 2024-10-23

## 2024-10-23 DIAGNOSIS — M76.52 PATELLAR TENDONITIS OF LEFT KNEE: Primary | ICD-10-CM

## 2024-10-23 DIAGNOSIS — G89.29 CHRONIC PAIN OF LEFT KNEE: ICD-10-CM

## 2024-10-23 DIAGNOSIS — M25.362 OTHER INSTABILITY, LEFT KNEE: ICD-10-CM

## 2024-10-23 DIAGNOSIS — M25.562 CHRONIC PAIN OF LEFT KNEE: ICD-10-CM

## 2024-10-23 PROCEDURE — 97110 THERAPEUTIC EXERCISES: CPT

## 2024-10-23 PROCEDURE — 97530 THERAPEUTIC ACTIVITIES: CPT

## 2024-10-23 NOTE — PROGRESS NOTES
Progress Note  [facility] [address] [place] [address]      Patient: Agustin Stubbs   : 1975  Diagnosis/ICD-10 Code:  Patellar tendonitis of left knee [M76.52]  Referring practitioner: Conrado Rasmussen MD  Date of Initial Visit: Type: THERAPY  Noted: 2024  Today's Date: 10/23/2024  Patient seen for 8 sessions      Subjective:   Subjective Questionnaire: LEFS: 63/80 (78.75% function)   Clinical Progress: improved  Home Program Compliance: Yes  Treatment has included: therapeutic exercise, neuromuscular re-education, manual therapy, therapeutic activity, and gait training    Subjective     Pt late to session due to traffic.     Pt reports that the L knee is still not like the R one, but it is a lot better. Pain at worst in past week has been 1/10. L knee is 60% better than it was.     Objective   See Exercise, Manual, and Modality Logs for complete treatment.     Assessment/Plan    Pt tolerated today's session well. Today was reassessment day. Pt's score on the LEFS has remained the same since the last progress note. However, pt has noted good subjective improvement in his L knee since starting therapy. Pt was able to meet both of his pain goals today. To date, pt has met a total of 3 of his goals but has 3 unmet goals remaining. Pt improving well, as evidenced by increased tolerance to therapy sessions. Skilled therapy still required in order to address the aforementioned impairments so the pt can return to his PLOF at work, which requires him to walk multiple mieles per day. Continue POC.      Goals  Plan Goals: KNEE PROBLEMS:      1. The patient has symptoms of the L knee limiting function              LTG 1: 12 weeks:  The patient will report 65% improvement in the L knee since the initial evaluation, in order to allow him to walk multiple miles at work without pain.                          STATUS:  progressing               STG 1a: 6 weeks:  The patient will report 25%  improvement in the L knee since the initial evaluation, in order to allow him to walk multiple miles at work without pain.                          STATUS:  MET     2. The patient has pain of the L knee              LTG 2: 12 weeks: The patient will report L knee pain of 1/10 or better, at its worst in the past week, in order to allow increased tolerance to work activities, such as prolonged walking.                          STATUS:  MET              STG 2a: 6 weeks: The patient will report L knee pain of 2/10 or better, at its worst in the past week, in order to allow increased tolerance to work activities, such as prolonged walking.                          STATUS:  MET     3. Mobility: Walking/Moving Around Functional Limitation                               LTG 3: 12 weeks:  The patient will demonstrate 95% function or better on the LEFS.                          STATUS:  progressing              STG 3a: 6 weeks:  The patient will demonstrate 85% function or better on the LEFS.                            STATUS:  progressing      Progress toward previous goals: Partially Met      Recommendations: Continue as planned  Timeframe: 3 weeks  Prognosis to achieve goals: good    Signature: Chante Hoff PT    Electronically signed 10/23/2024    [place] License: PT - [id number]      Timed:  Manual Therapy:    0     mins  47856;  Therapeutic Exercise:    8     mins  35874;     Neuromuscular Malachi:    0    mins  93679;    Therapeutic Activity:    11     mins  90746;     Gait Trainin     mins  94330;     Aquatics                         0      mins  34131    Un-timed:  Mechanical Traction      0     mins  01178  Dry Needling     0     mins self-pay  Electrical Stimulation:    0     mins  34774 ( );    Timed Treatment:   19   mins   Total Treatment:     19   mins

## 2024-10-29 ENCOUNTER — TELEPHONE (OUTPATIENT)
Dept: PHYSICAL THERAPY | Facility: CLINIC | Age: 49
End: 2024-10-29

## 2024-10-31 ENCOUNTER — TREATMENT (OUTPATIENT)
Dept: PHYSICAL THERAPY | Facility: CLINIC | Age: 49
End: 2024-10-31
Payer: COMMERCIAL

## 2024-10-31 DIAGNOSIS — M25.362 OTHER INSTABILITY, LEFT KNEE: ICD-10-CM

## 2024-10-31 DIAGNOSIS — M25.562 CHRONIC PAIN OF LEFT KNEE: ICD-10-CM

## 2024-10-31 DIAGNOSIS — G89.29 CHRONIC PAIN OF LEFT KNEE: ICD-10-CM

## 2024-10-31 DIAGNOSIS — M76.52 PATELLAR TENDONITIS OF LEFT KNEE: Primary | ICD-10-CM

## 2024-10-31 NOTE — PROGRESS NOTES
Outpatient Physical Therapy                   Physical Therapy Daily Treatment Note    Patient: Agustin Stubbs   : 1975  Diagnosis/ICD-10 Code:  Patellar tendonitis of left knee [M76.52]  Referring practitioner: Conrado Rasmussen MD  Date of Initial Visit: Type: THERAPY  Noted: 2024  Today's Date: 10/31/2024  Patient seen for 9 sessions             Subjective   Agustin Stubbs reports: his knee occasionally gives out on him. The last occurrence was on Tuesday when he got out of his truck to check the mail. Typically it occurs after in activity.     Pain: 0/10 pain, at time of arrival.     Objective     See Exercise, Manual, and Modality Logs for complete treatment.     Assessment/Plan  Patient was fatigued by exercises, especially side steps and monster walks. Pt would benefit from skilled PT to address Range of Motion  and Strength deficits, pain management and any concerns with ADLs.     Progress per Plan of Care      Timed:  Manual Therapy:    0     mins  77423;  Therapeutic Exercise:    20     mins  66871;     Neuromuscular Malachi:    0    mins  93888;    Therapeutic Activity:     8     mins  99423;     Gait Trainin     mins  34692;    Aquatic Therapy:     0     mins  21671;       Untimed:  Electrical Stimulation:    0     mins  34142 ( );  Mechanical Traction:    0     mins  34655;       Timed Treatment:   28   mins   Total Treatment:     28   mins      Electronically signed:   Rina Gagnon PTA  Physical Therapist Assistant  [facility] License #: [id number]

## 2024-11-14 ENCOUNTER — TREATMENT (OUTPATIENT)
Dept: PHYSICAL THERAPY | Facility: CLINIC | Age: 49
End: 2024-11-14
Payer: COMMERCIAL

## 2024-11-14 DIAGNOSIS — G89.29 CHRONIC PAIN OF LEFT KNEE: ICD-10-CM

## 2024-11-14 DIAGNOSIS — M25.362 OTHER INSTABILITY, LEFT KNEE: ICD-10-CM

## 2024-11-14 DIAGNOSIS — M25.562 CHRONIC PAIN OF LEFT KNEE: ICD-10-CM

## 2024-11-14 DIAGNOSIS — M76.52 PATELLAR TENDONITIS OF LEFT KNEE: Primary | ICD-10-CM

## 2024-11-14 PROCEDURE — 97530 THERAPEUTIC ACTIVITIES: CPT

## 2024-11-14 PROCEDURE — 97110 THERAPEUTIC EXERCISES: CPT

## 2024-11-14 NOTE — PROGRESS NOTES
Re-Assessment / Discharge   [facility] [address] [place] [address]      Patient: Agustin Stubbs   : 1975  Diagnosis/ICD-10 Code:  Patellar tendonitis of left knee [M76.52]  Referring practitioner: Conrado Rasmussen MD  Date of Initial Visit: Type: THERAPY  Noted: 2024  Today's Date: 2024  Patient seen for 10 sessions      Subjective:   Subjective Questionnaire: LEFS: 64/80  (80% function)     Clinical Progress: improved  Home Program Compliance: Yes  Treatment has included: therapeutic exercise, neuromuscular re-education, manual therapy, therapeutic activity, and gait training    Subjective     Pt late to session due to traffic.    L knee is 80% better than it was 3 months ago.    Objective   See Exercise, Manual, and Modality Logs for complete treatment.     Assessment/Plan  Pt tolerated today's session well. Today was reassessment day. Pt has noticed good improvement in knee symptoms. Pt has met the majority of his goals. Pt and therapist are in agreement that he can discharge from therapy today. Pt given updated HEP for gym and home.      Goals  Plan Goals: KNEE PROBLEMS:      1. The patient has symptoms of the L knee limiting function              LTG 1: 12 weeks:  The patient will report 65% improvement in the L knee since the initial evaluation, in order to allow him to walk multiple miles at work without pain.                          STATUS:  MET              STG 1a: 6 weeks:  The patient will report 25% improvement in the L knee since the initial evaluation, in order to allow him to walk multiple miles at work without pain.                          STATUS:  MET     2. The patient has pain of the L knee              LTG 2: 12 weeks: The patient will report L knee pain of 1/10 or better, at its worst in the past week, in order to allow increased tolerance to work activities, such as prolonged walking.                          STATUS:  MET              STG 2a: 6 weeks:  The patient will report L knee pain of 2/10 or better, at its worst in the past week, in order to allow increased tolerance to work activities, such as prolonged walking.                          STATUS:  MET     3. Mobility: Walking/Moving Around Functional Limitation                               LTG 3: 12 weeks:  The patient will demonstrate 95% function or better on the LEFS.                          STATUS:  not met              STG 3a: 6 weeks:  The patient will demonstrate 85% function or better on the LEFS.                            STATUS:  not met    Progress toward previous goals: Partially Met      Recommendations: Discharge    Signature: Chante Hoff PT    Electronically signed 2024    [place] License: PT - [id number]      Based upon review of the patient's progress and continued therapy plan, it is my medical opinion that Agustin Stubbs should continue physical therapy treatment at [facility] PH[facility]  [address] [place] [id number] [phone].    Signature: __________________________________  Conrado Rasmussen MD    90 Day Recertification  Certification Period: 2024 thru 2025  I certify that the therapy services are furnished while this patient is under my care.  The services outlined above are required by this patient, and will be reviewed every 90 days.     PHYSICIAN: Conrado Rasmussen MD  NPI: [id number]      DATE:     Please sign and return via fax to [phone]. Thank you, [facility].    Timed:  Manual Therapy:    0     mins  65365;  Therapeutic Exercise:    16     mins  68457;     Neuromuscular Malachi:    0    mins  79965;    Therapeutic Activity:     8     mins  07534;     Gait Trainin     mins  17020;     Aquatics                         0      mins  03692    Un-timed:  Mechanical Traction      0     mins  09925  Dry Needling     0     mins self-pay  Electrical Stimulation:    0     mins  19865 (  );    Timed Treatment:   24   mins   Total Treatment:     24   mins

## 2024-12-17 ENCOUNTER — PREP FOR SURGERY (OUTPATIENT)
Dept: OTHER | Facility: HOSPITAL | Age: 49
End: 2024-12-17

## 2024-12-17 ENCOUNTER — OFFICE VISIT (OUTPATIENT)
Dept: SURGERY | Facility: CLINIC | Age: 49
End: 2024-12-17

## 2024-12-17 VITALS
HEART RATE: 95 BPM | DIASTOLIC BLOOD PRESSURE: 93 MMHG | BODY MASS INDEX: 38.2 KG/M2 | OXYGEN SATURATION: 96 % | WEIGHT: 229.28 LBS | HEIGHT: 65 IN | SYSTOLIC BLOOD PRESSURE: 141 MMHG

## 2024-12-17 DIAGNOSIS — Z86.0100 HISTORY OF COLONIC POLYPS: ICD-10-CM

## 2024-12-17 DIAGNOSIS — K21.00 GASTROESOPHAGEAL REFLUX DISEASE WITH ESOPHAGITIS WITHOUT HEMORRHAGE: Primary | ICD-10-CM

## 2024-12-17 DIAGNOSIS — K21.9 GASTROESOPHAGEAL REFLUX DISEASE WITHOUT ESOPHAGITIS: Primary | ICD-10-CM

## 2024-12-17 DIAGNOSIS — Z12.11 SCREENING FOR MALIGNANT NEOPLASM OF COLON: ICD-10-CM

## 2024-12-17 RX ORDER — POLYETHYLENE GLYCOL 3350 17 G/17G
POWDER, FOR SOLUTION ORAL
Qty: 238 PACKET | Refills: 0 | Status: SHIPPED | OUTPATIENT
Start: 2024-12-17

## 2024-12-17 RX ORDER — SODIUM CHLORIDE 9 MG/ML
40 INJECTION, SOLUTION INTRAVENOUS AS NEEDED
OUTPATIENT
Start: 2024-12-17

## 2024-12-17 RX ORDER — SODIUM CHLORIDE 0.9 % (FLUSH) 0.9 %
3 SYRINGE (ML) INJECTION EVERY 12 HOURS SCHEDULED
OUTPATIENT
Start: 2024-12-17

## 2024-12-17 RX ORDER — SODIUM CHLORIDE 0.9 % (FLUSH) 0.9 %
10 SYRINGE (ML) INJECTION AS NEEDED
OUTPATIENT
Start: 2024-12-17

## 2024-12-17 NOTE — PROGRESS NOTES
Chief Complaint: Colonoscopy    Subjective      EGD and colonoscopy consultation       History of Present Illness  Agustin Stubbs is a 49 y.o. male presents to [facility] for EGD and colonoscopy consultation.    Patient presents today for a EGD and colonoscopy consultation.  Patient denies any dysphagia.  Patient does admit to heartburn and indigestion despite taking OTC meds.  Patient denies any melena.  Denies any pain before or after eating.  Patient does admit to intermittent epigastric pain.    Denies any lower abdominal pain, change in bowel habit, or rectal bleeding.  Admits to family history of colon cancer with his maternal uncle.  Admits to history of colonic polyps.    Denies LISSA.  Denies any cardiac issues.  Denies taking any GLP-1 receptors.    12/21: Colonoscopy ([facility]): Transverse- tubular adenoma; Ascending - tubular adenoma.     11/20: egd & colon ([facility]): distal esophagitis; Ascending - tubular adenoma; Sigmoid- tubular adenoma with high grade dysplasia.       Objective     Past Medical History:   Diagnosis Date    Acid reflux     Chronic allergic rhinitis     Head injury     High blood pressure     Hyperlipemia     Hypertension     No pertinent past medical history     Seizures        Past Surgical History:   Procedure Laterality Date    COLONOSCOPY      COLONOSCOPY N/A 12/20/2021    Procedure: COLONOSCOPY with possible biopies.;  Surgeon: Aaron Perla MD;  Location: [facility];  Service: General;  Laterality: N/A;  COLON POLYPS       Outpatient Medications Marked as Taking for the 12/17/24 encounter (Office Visit) with Elio April, APRN   Medication Sig Dispense Refill    metoprolol succinate XL (TOPROL-XL) 25 MG 24 hr tablet TAKE 1 TABLET BY MOUTH DAILY 30 tablet 1       Allergies   Allergen Reactions    Penicillins Unknown - High Severity        Family History   Problem Relation Age of Onset    Cancer Mother     Diabetes Mother     Other  "Father         RENAL CALCULUS    Diabetes Father     Cancer Brother     Breast cancer Other 30        AUNT    Lung cancer Other         UNCLE    Malig Hyperthermia Neg Hx        Social History     Socioeconomic History    Marital status:    Tobacco Use    Smoking status: Every Day     Current packs/day: 0.25     Types: Cigarettes     Passive exposure: Current    Smokeless tobacco: Never    Tobacco comments:     CURRENT EVERY DAY SMOKER, SMOKED 21-30 YEARS   Vaping Use    Vaping status: Never Used   Substance and Sexual Activity    Alcohol use: Yes     Comment: 4 DRINKS PER DAY, HAS BEEN DRINKING FOR 21-30 YEARS    Drug use: Not Currently     Comment: \"Long time ago party drugs\"    Sexual activity: Defer       Review of Systems   Constitutional:  Negative for chills and fever.   HENT:  Negative for trouble swallowing.    Gastrointestinal:  Positive for GERD and indigestion. Negative for abdominal distention, abdominal pain, anal bleeding, blood in stool, constipation, diarrhea, nausea, rectal pain and vomiting.        Vital Signs:   /93 (BP Location: Right arm, Patient Position: Sitting, Cuff Size: Large Adult)   Pulse 95   Ht 165.1 cm (65\")   Wt 104 kg (229 lb 4.5 oz)   SpO2 96%   BMI 38.15 kg/m²      Physical Exam  Vitals and nursing note reviewed.   Constitutional:       General: He is not in acute distress.     Appearance: Normal appearance. He is not ill-appearing.   HENT:      Head: Normocephalic and atraumatic.   Cardiovascular:      Rate and Rhythm: Normal rate.   Pulmonary:      Effort: Pulmonary effort is normal.      Breath sounds: No stridor.   Abdominal:      Palpations: Abdomen is soft.      Tenderness: There is no guarding.   Musculoskeletal:         General: No deformity. Normal range of motion.   Skin:     General: Skin is warm and dry.      Coloration: Skin is not jaundiced.   Neurological:      General: No focal deficit present.      Mental Status: He is alert and oriented to " person, place, and time.   Psychiatric:         Mood and Affect: Mood normal.         Thought Content: Thought content normal.          Result Review :          []  Laboratory  []  Radiology  []  Pathology  []  Microbiology  []  EKG/Telemetry   []  Cardiology/Vascular   []  Old records  I spent 25 minutes caring for Agustin on this date of service. This time includes time spent by me in the following activities: reviewing tests, obtaining and/or reviewing a separately obtained history, performing a medically appropriate examination and/or evaluation, ordering medications, tests, or procedures, and documenting information in the medical record        Assessment and Plan    Diagnoses and all orders for this visit:    1. Gastroesophageal reflux disease without esophagitis (Primary)    2. Screening for malignant neoplasm of colon    3. History of colonic polyps    Other orders  -     polyethylene glycol (MIRALAX) 17 g packet; Take as directed.  Instructions given in office.  Dispense: 238 g bottle  Dispense: 238 packet; Refill: 0        Follow Up   Return for Schedule EGD and colonoscopy with Dr. Perla on 3/3/2025 at [facility].    Hospital arrival time: 0830    Possible risks/complications, benefits, and alternatives to surgical or invasive procedures have been explained to patient and/or legal guardian.    Patient has been evaluated and can tolerate anesthesia and/or sedation. Risks, benefits, and alternatives to anesthesia and sedation have been explained to the patient and/or legal guardian. Patient verbalizes understanding and is willing to proceed with the above plan.     Patient was given instructions and counseling regarding his condition or for health maintenance advice. Please see specific information pulled into the AVS if appropriate.     Thank you for allowing me to participate in the care of this patient. Please call with questions or concerns.

## 2025-01-09 NOTE — CONSULTS
Internal Medicine Consult  INTERNAL MEDICINE   [facility]       Patient Identification:  Name: Agustin Stubbs  Age: 47 y.o.  Sex: male  :  1975  MRN: [id number]                   Primary Care Physician: Indy Moffett APRN                               Requesting physician: Dr. Monroe  Date of consultation: 2023    Reason for consultation: Patient is being transferred out of the ICU and ongoing need for out of ICU care including management of alcohol withdrawal.    History of Present Illness:   Patient is a 47-year-old male who was admitted to our facility from [facility] for evaluation of status epilepticus in the setting of traumatic brain injury and ongoing alcohol use.  Patient was intubated at the facility and was admitted to our ICU on 2023.  Patient was being managed by intensivist service as well as neurology service and earlier today was extubated.  Patient did not have any ongoing episodes of seizure and EEG monitoring did not show any epileptiform activity.  Patient was continued on Keppra.  Because of his alcohol use and concern for alcohol withdrawal patient was placed on CIWA protocol.  Patient is transferred out of ICU and internal medicine service is consulted for ongoing care out of ICU.  Patient currently denies any specific complaints.        Past Medical History:  Past Medical History:   Diagnosis Date    Chronic allergic rhinitis     High blood pressure     Hyperlipemia     Hypertension     No pertinent past medical history      Past Surgical History:  Past Surgical History:   Procedure Laterality Date    COLONOSCOPY      COLONOSCOPY N/A 2021    Procedure: COLONOSCOPY with possible biopies.;  Surgeon: Aaron Perla MD;  Location: [facility] ENDOSCOPY;  Service: General;  Laterality: N/A;  COLON POLYPS      Home Meds:  Medications Prior to Admission   Medication Sig Dispense Refill Last Dose    fenofibrate 160 MG tablet TAKE ONE TABLET  BY MOUTH DAILY 90 tablet 1     fluticasone (FLONASE) 50 MCG/ACT nasal spray 2 sprays into the nostril(s) as directed by provider Daily. 11.1 mL 1     Lactobacillus (PROBIOTIC ACIDOPHILUS PO) Take  by mouth.       lisinopril-hydrochlorothiazide (PRINZIDE,ZESTORETIC) 10-12.5 MG per tablet TAKE ONE TABLET BY MOUTH DAILY 90 tablet 1     multivitamin with minerals tablet tablet Take 1 tablet by mouth Daily.       nicotine (Nicoderm CQ) 21 MG/24HR patch Place 1 patch on the skin as directed by provider Daily. 30 each 0     omeprazole (priLOSEC) 40 MG capsule Take 1 capsule by mouth Daily. 90 capsule 1      Current Meds:     Current Facility-Administered Medications:     acetaminophen (TYLENOL) tablet 650 mg, 650 mg, Oral, Q4H PRN, 650 mg at 09/29/23 0820 **OR** acetaminophen (TYLENOL) suppository 650 mg, 650 mg, Rectal, Q4H PRN, Gildardo Monroe Jr., MD    sennosides-docusate (PERICOLACE) 8.6-50 MG per tablet 2 tablet, 2 tablet, Oral, BID, 2 tablet at 09/29/23 0819 **AND** polyethylene glycol (MIRALAX) packet 17 g, 17 g, Oral, Daily PRN **AND** bisacodyl (DULCOLAX) EC tablet 5 mg, 5 mg, Oral, Daily PRN **AND** bisacodyl (DULCOLAX) suppository 10 mg, 10 mg, Rectal, Daily PRN, Gildardo Monroe Jr., MD    Calcium Replacement - Follow Nurse / BPA Driven Protocol, , Does not apply, PRN, Gildardo Monroe Jr., MD    Enoxaparin Sodium (LOVENOX) syringe 40 mg, 40 mg, Subcutaneous, Q24H, Gildardo Monroe Jr., MD, 40 mg at 09/29/23 1355    famotidine (PEPCID) injection 20 mg, 20 mg, Intravenous, BID, Gildardo Monroe Jr., MD, 20 mg at 09/29/23 0819    folic acid 1 mg in sodium chloride 0.9 % 50 mL IVPB, 1 mg, Intravenous, Daily, Gildardo Monroe Jr., MD, Last Rate: 100 mL/hr at 09/29/23 0821, 1 mg at 09/29/23 0821    lisinopril (PRINIVIL,ZESTRIL) tablet 10 mg, 10 mg, Oral, Q24H, 10 mg at 09/29/23 1110 **AND** hydroCHLOROthiazide (HYDRODIURIL) tablet 12.5 mg, 12.5 mg, Oral, Q24H, Gildardo Monroe Jr., MD, 12.5 mg  at 23 1110    labetalol (NORMODYNE,TRANDATE) injection 20 mg, 20 mg, Intravenous, Q10 Min PRN, Gildardo Monroe Jr., MD, 20 mg at 23 0836    levETIRAcetam (KEPPRA) injection 1,000 mg, 1,000 mg, Intravenous, Q12H, Gildardo Monroe Jr., MD, 1,000 mg at 23 0819    [] LORazepam (ATIVAN) injection 2 mg, 2 mg, Intravenous, Q6H, 2 mg at 23 0614 **FOLLOWED BY** LORazepam (ATIVAN) injection 1 mg, 1 mg, Intravenous, Q6H, Gildardo Monroe Jr., MD, 1 mg at 23 1202    LORazepam (ATIVAN) tablet 1 mg, 1 mg, Oral, Q1H PRN **OR** LORazepam (ATIVAN) injection 1 mg, 1 mg, Intravenous, Q1H PRN **OR** LORazepam (ATIVAN) tablet 2 mg, 2 mg, Oral, Q1H PRN **OR** LORazepam (ATIVAN) injection 2 mg, 2 mg, Intravenous, Q1H PRN **OR** LORazepam (ATIVAN) injection 2 mg, 2 mg, Intravenous, Q15 Min PRN **OR** LORazepam (ATIVAN) injection 2 mg, 2 mg, Intramuscular, Q15 Min PRN **OR** LORazepam (ATIVAN) tablet 4 mg, 4 mg, Oral, Q1H PRN **OR** LORazepam (ATIVAN) injection 4 mg, 4 mg, Intravenous, Q1H PRN, Gildardo Monroe Jr., MD    Magnesium Standard Dose Replacement - Follow Nurse / BPA Driven Protocol, , Does not apply, PRN, Gildardo Monroe Jr., MD    multivitamin with minerals 1 tablet, 1 tablet, Oral, Daily, Gildardo Monroe Jr., MD, 1 tablet at 23 0819    nicotine (NICODERM CQ) 21 MG/24HR patch 1 patch, 1 patch, Transdermal, Q24H, Gildardo Monroe Jr., MD, 1 patch at 23 1354    nitroglycerin (NITROSTAT) SL tablet 0.4 mg, 0.4 mg, Sublingual, Q5 Min PRN, Gildardo Monroe Jr., MD    ondansetron (ZOFRAN) injection 4 mg, 4 mg, Intravenous, Q6H PRN, Gildardo Monroe Jr., MD    Phosphorus Replacement - Follow Nurse / BPA Driven Protocol, , Does not apply, PRNMabel Harold Dale Jr., MD    Potassium Replacement - Follow Nurse / BPA Driven Protocol, , Does not apply, Mabel ELLIOTT Harold Dale Jr., MD    sodium chloride 0.9 % flush 10 mL, 10 mL, Intravenous, Q12H, Mabel  Gildardo Feng Jr., MD, 10 mL at 09/29/23 0826    sodium chloride 0.9 % flush 10 mL, 10 mL, Intravenous, PRN, Gildardo Monroe Jr., MD    sodium chloride 0.9 % infusion 40 mL, 40 mL, Intravenous, PRN, Gildardo Monroe Jr., MD    sodium chloride 0.9 % infusion, 100 mL/hr, Intravenous, Continuous, Gildardo Monroe Jr., MD, Stopped at 09/29/23 1432    thiamine (B-1) injection 200 mg, 200 mg, Intravenous, Q8H, 200 mg at 09/29/23 1354 **FOLLOWED BY** [START ON 10/2/2023] thiamine (VITAMIN B-1) tablet 100 mg, 100 mg, Oral, Daily, Gildardo Monroe Jr., MD  Allergies:  Allergies   Allergen Reactions    Penicillins Unknown - High Severity     Social History:   Social History     Tobacco Use    Smoking status: Every Day     Packs/day: 0.25     Types: Cigarettes     Passive exposure: Current    Smokeless tobacco: Never    Tobacco comments:     CURRENT EVERY DAY SMOKER, SMOKED 21-30 YEARS   Substance Use Topics    Alcohol use: Yes     Comment: 4 DRINKS PER DAY, HAS BEEN DRINKING FOR 21-30 YEARS      Family History:  Family History   Problem Relation Age of Onset    Cancer Mother     Diabetes Mother     Other Father         RENAL CALCULUS    Diabetes Father     Cancer Brother     Breast cancer Other 30        AUNT    Lung cancer Other         UNCLE    Malig Hyperthermia Neg Hx           Review of Systems  See history of present illness and past medical history.   Overall feels fine denies any specific fever chills or any other complaints.      Vitals:   BP (!) 173/101 Comment: Patient agitated, no laying still  Pulse 83   Temp 98.2 °F (36.8 °C) (Oral)   Resp 20   Wt 96.1 kg (211 lb 13.8 oz)   SpO2 98%   BMI 29.56 kg/m²   I/O:   Intake/Output Summary (Last 24 hours) at 9/29/2023 1549  Last data filed at 9/29/2023 1300  Gross per 24 hour   Intake 240 ml   Output 1750 ml   Net -1510 ml     Exam:  Patient is examined using the personal protective equipment as per guidelines from infection control for this particular  patient as enacted.  Hand washing was performed before and after patient interaction.  General Appearance:    Alert, cooperative, no distress, appears stated age   Head:    Normocephalic, without obvious abnormality, atraumatic   Eyes:    PERRL, conjunctiva/corneas clear, EOM's intact, both eyes   Ears:    Normal external ear canals, both ears   Nose:   Nares normal, septum midline, mucosa normal, no drainage    or sinus tenderness   Throat:   Lips, tongue, gums normal; oral mucosa pink and moist   Neck: Supple and no adenopathy noted   Back:     Symmetric, no curvature, ROM normal, no CVA tenderness   Lungs:     Clear to auscultation bilaterally, respirations unlabored   Chest Wall:    No tenderness or deformity    Heart:  S1-S2 regular   Abdomen:   Soft nontender   Extremities:   Extremities normal, atraumatic, no cyanosis or edema   Pulses:   Pulses palpable in all extremities; symmetric all extremities   Skin: No rash noted   Neurologic: Grossly nonfocal examination tion grossly intact to light touch, no focal deficits noted       Data Review:      I reviewed the patient's new clinical results.  Results from last 7 days   Lab Units 09/29/23  0338 09/27/23  1642 09/27/23 0442   WBC 10*3/mm3 10.52 11.33* 16.21*   HEMOGLOBIN g/dL 13.6 14.8 16.9   PLATELETS 10*3/mm3 208 226 362     Results from last 7 days   Lab Units 09/28/23  0914 09/27/23  1642 09/27/23  0609 09/27/23  0444 09/27/23  0442   SODIUM mmol/L 139 145  --   --  140   SODIUM, ARTERIAL mmol/L  --   --  141.4 143.5  --    POTASSIUM mmol/L 3.8 3.5  --   --  4.1   CHLORIDE mmol/L 108* 111*  --   --  99   CO2 mmol/L 21.8* 21.8*  --   --  5.3*   BUN mg/dL 12 13  --   --  12   CREATININE mg/dL 0.95 0.88  --   --  1.25   CALCIUM mg/dL 8.4* 8.2*  --   --  9.8   GLUCOSE mg/dL 124* 98  --   --  252*   GLUCOSE, ARTERIAL mg/dL  --   --  272* 245*  --      SCANNED - TELEMETRY     Final Result      SCANNED - TELEMETRY     Final Result      SCANNED - TELEMETRY      Final Result        CT Head Without Contrast    Result Date: 9/27/2023   No acute intracranial hemorrhage is seen.  No acute infarct.  No significant interval change is appreciated since the 09/01 2019 CT study with bifrontal and bi-temporal, left greater than right, posttraumatic encephalomalacia suspected, as detailed above.     Please note that portions of this note were completed with a voice recognition program.  PAPI SHERWOOD JR, MD       Electronically Signed and Approved By: PAPI SHERWOOD JR, MD on 9/27/2023 at 5:51              XR Chest 1 View    Result Date: 9/27/2023  Hardware as described.  This report was finalized on 9/27/2023 1:19 PM by Dr. Gerard Vora M.D.      XR Chest 1 View    Result Date: 9/27/2023    The endotracheal (ET) tube is in satisfactory position.  No acute infiltrate is seen.  Probably no cardiac enlargement.      Please note that portions of this note were completed with a voice recognition program.  PAPI SHERWOOD JR, MD       Electronically Signed and Approved By: PAPI SHERWOOD JR, MD on 9/27/2023 at 5:42              XR Abdomen KUB    Result Date: 9/27/2023    1. Enteric tube has been advanced with the tip in the area of the stomach.     GLADYS NORWOOD MD       Electronically Signed and Approved By: GLADYS NORWOOD MD on 9/27/2023 at 9:31             XR Abdomen KUB    Result Date: 9/27/2023    1. NG tube, as above.  Recommend retracting the tube approximately 5 cm and then advancing the tube 8 cm for better positioning.     Jamil Nunez M.D.       Electronically Signed and Approved By: Jamil Nunez M.D. on 9/27/2023 at 8:13            Microbiology Results (last 10 days)       Procedure Component Value - Date/Time    Blood Culture - Blood, Arm, Right [[id number]]  (Normal) Collected: 09/27/23 0533    Lab Status: Preliminary result Specimen: Blood from Arm, Right Updated: 09/30/23 0545     Blood Culture No growth at 3 days    Blood Culture - Blood, Arm, Left [[id number]]  (Normal)  Collected: 09/27/23 0533    Lab Status: Preliminary result Specimen: Blood from Arm, Left Updated: 09/30/23 0545     Blood Culture No growth at 3 days            Assessment:  Active Hospital Problems    Diagnosis  POA    **Status epilepticus [G40.901]  Yes    Alcohol withdrawal [F10.939]  Unknown    Hypoxia [R09.02]  Unknown    Essential hypertension [I10]  Yes       Medical decision making/care plan:  Continue present management of seizure precautions and Keppra as per neurology service  Monitor for alcohol withdrawal and continue with vitamin replacement and CIWA protocol with as needed lorazepam  Continue antihypertensive regimen  [facility] will assume care going forward with ongoing management with neurology service.    Jojo Portillo MD   9/29/2023  15:49 EDT    Parts of this note may be an electronic transcription/translation of spoken language to printed text using the Dragon dictation system.     Warm/Dry

## 2025-01-27 RX ORDER — METOPROLOL SUCCINATE 25 MG/1
25 TABLET, EXTENDED RELEASE ORAL
Qty: 30 TABLET | Refills: 0 | Status: SHIPPED | OUTPATIENT
Start: 2025-01-27

## 2025-01-27 NOTE — TELEPHONE ENCOUNTER
Beta-Blockers Protocol [id number]/25/2025 06:38 AM   Protocol Details Recent or future visit with authorizing provider    BP under 140/90 in past year

## 2025-01-27 NOTE — TELEPHONE ENCOUNTER
HUB TO RELAY & SCHEDULE:    Called to schedule a 6 month follow up from his last OV on 2/8/24. CHRISTINE

## 2025-01-28 NOTE — TELEPHONE ENCOUNTER
HUB TO RELAY & SCHEDULE:     Called to schedule a 6 month follow up from his last OV on 2/8/24. LVM X2

## 2025-01-29 NOTE — TELEPHONE ENCOUNTER
HUB TO RELAY & SCHEDULE:     Called to schedule a 6 month follow up from his last OV on 2/8/24. LVM X3

## 2025-02-24 NOTE — PAT
Left message on voicemail with arrival time of  0830    Bring picture ID and insurance card, have  over 18 to give ride home.     Bring medication list but do not take any meds except inhalers that morning. Bring medications with you to the hospital, including inhalers.     Complete prep prior to arrival and call [phone] with any questions.     Come to [facility].

## 2025-02-27 RX ORDER — METOPROLOL SUCCINATE 25 MG/1
25 TABLET, EXTENDED RELEASE ORAL
Qty: 30 TABLET | Refills: 0 | Status: SHIPPED | OUTPATIENT
Start: 2025-02-27

## 2025-02-28 ENCOUNTER — ANESTHESIA EVENT (OUTPATIENT)
Dept: GASTROENTEROLOGY | Facility: HOSPITAL | Age: 50
End: 2025-02-28
Payer: COMMERCIAL

## 2025-02-28 NOTE — ANESTHESIA PREPROCEDURE EVALUATION
" Anesthesia Evaluation     Patient summary reviewed and Nursing notes reviewed   NPO Solid Status: > 8 hours  NPO Liquid Status: > 2 hours           Airway   Dental      Pulmonary    (+) a smoker Current,  Cardiovascular     Patient on routine beta blocker    (+) hypertension well controlled, hyperlipidemia      Neuro/Psych  (+) seizures (pt was on Vimpat - no longer taking), psychiatric history  GI/Hepatic/Renal/Endo    (+) GERD    Musculoskeletal     Abdominal    Substance History   (+) alcohol use (9/2024 CIWA re w/d delirium)     OB/GYN      Comment: N/a       Other        ROS/Med Hx Other: Per 4/8/2024 Neurology note by Dr. Ontiveros, pt hx sz, \"frontal lobe abnormality noted on MRI when the previous intracranial bleed, the seizure was status epilepticus. It was attributed to alcohol withdrawal however he states that he was drinking alcohol the night prior to the seizure which makes it unlikely.  Also did not undergo withdrawals.\"                    Anesthesia Plan    ASA 3     general     (Total IV Anesthesia    Patient understands anesthesia not responsible for dental damage. Discussed risks with pt including aspiration, allergic reactions, apnea, advanced airway placement. Pt verbalized understanding. All questions answered.  )  intravenous induction     Anesthetic plan, risks, benefits, and alternatives have been provided, discussed and informed consent has been obtained with: patient.  Pre-procedure education provided  Plan discussed with CRNA.      CODE STATUS:         "

## 2025-03-03 ENCOUNTER — HOSPITAL ENCOUNTER (OUTPATIENT)
Facility: HOSPITAL | Age: 50
Setting detail: HOSPITAL OUTPATIENT SURGERY
Discharge: HOME | End: 2025-03-03
Attending: SURGERY | Admitting: SURGERY
Payer: COMMERCIAL

## 2025-03-03 ENCOUNTER — ANESTHESIA (OUTPATIENT)
Dept: GASTROENTEROLOGY | Facility: HOSPITAL | Age: 50
End: 2025-03-03
Payer: COMMERCIAL

## 2025-03-03 VITALS
HEIGHT: 69 IN | SYSTOLIC BLOOD PRESSURE: 129 MMHG | WEIGHT: 220.46 LBS | OXYGEN SATURATION: 95 % | RESPIRATION RATE: 12 BRPM | BODY MASS INDEX: 32.65 KG/M2 | TEMPERATURE: 98.7 F | HEART RATE: 91 BPM | DIASTOLIC BLOOD PRESSURE: 87 MMHG

## 2025-03-03 DIAGNOSIS — K21.00 GASTROESOPHAGEAL REFLUX DISEASE WITH ESOPHAGITIS WITHOUT HEMORRHAGE: ICD-10-CM

## 2025-03-03 DIAGNOSIS — Z86.0100 HISTORY OF COLONIC POLYPS: ICD-10-CM

## 2025-03-03 DIAGNOSIS — Z12.11 SCREENING FOR MALIGNANT NEOPLASM OF COLON: ICD-10-CM

## 2025-03-03 PROCEDURE — 25810000003 LACTATED RINGERS PER 1000 ML

## 2025-03-03 PROCEDURE — 25010000002 PROPOFOL 10 MG/ML EMULSION: Performed by: NURSE ANESTHETIST, CERTIFIED REGISTERED

## 2025-03-03 PROCEDURE — 88305 TISSUE EXAM BY PATHOLOGIST: CPT | Performed by: SURGERY

## 2025-03-03 PROCEDURE — 25010000002 LIDOCAINE PF 2% 2 % SOLUTION: Performed by: NURSE ANESTHETIST, CERTIFIED REGISTERED

## 2025-03-03 RX ORDER — SODIUM CHLORIDE 9 MG/ML
40 INJECTION, SOLUTION INTRAVENOUS AS NEEDED
Status: DISCONTINUED | OUTPATIENT
Start: 2025-03-03 | End: 2025-03-03 | Stop reason: HOSPADM

## 2025-03-03 RX ORDER — SODIUM CHLORIDE, SODIUM LACTATE, POTASSIUM CHLORIDE, CALCIUM CHLORIDE 600; 310; 30; 20 MG/100ML; MG/100ML; MG/100ML; MG/100ML
30 INJECTION, SOLUTION INTRAVENOUS CONTINUOUS
Status: DISCONTINUED | OUTPATIENT
Start: 2025-03-03 | End: 2025-03-03 | Stop reason: HOSPADM

## 2025-03-03 RX ORDER — SODIUM CHLORIDE 0.9 % (FLUSH) 0.9 %
3 SYRINGE (ML) INJECTION EVERY 12 HOURS SCHEDULED
Status: DISCONTINUED | OUTPATIENT
Start: 2025-03-03 | End: 2025-03-03 | Stop reason: HOSPADM

## 2025-03-03 RX ORDER — ONDANSETRON 2 MG/ML
4 INJECTION INTRAMUSCULAR; INTRAVENOUS ONCE AS NEEDED
Status: DISCONTINUED | OUTPATIENT
Start: 2025-03-03 | End: 2025-03-03 | Stop reason: HOSPADM

## 2025-03-03 RX ORDER — PROPOFOL 10 MG/ML
VIAL (ML) INTRAVENOUS AS NEEDED
Status: DISCONTINUED | OUTPATIENT
Start: 2025-03-03 | End: 2025-03-03 | Stop reason: SURG

## 2025-03-03 RX ORDER — ONDANSETRON 4 MG/1
4 TABLET, ORALLY DISINTEGRATING ORAL ONCE AS NEEDED
Status: DISCONTINUED | OUTPATIENT
Start: 2025-03-03 | End: 2025-03-03 | Stop reason: HOSPADM

## 2025-03-03 RX ORDER — SODIUM CHLORIDE 0.9 % (FLUSH) 0.9 %
10 SYRINGE (ML) INJECTION AS NEEDED
Status: DISCONTINUED | OUTPATIENT
Start: 2025-03-03 | End: 2025-03-03 | Stop reason: HOSPADM

## 2025-03-03 RX ORDER — LIDOCAINE HYDROCHLORIDE 20 MG/ML
INJECTION, SOLUTION EPIDURAL; INFILTRATION; INTRACAUDAL; PERINEURAL AS NEEDED
Status: DISCONTINUED | OUTPATIENT
Start: 2025-03-03 | End: 2025-03-03 | Stop reason: SURG

## 2025-03-03 RX ADMIN — PROPOFOL 50 MG: 10 INJECTION, EMULSION INTRAVENOUS at 10:14

## 2025-03-03 RX ADMIN — PROPOFOL 250 MCG/KG/MIN: 10 INJECTION, EMULSION INTRAVENOUS at 10:13

## 2025-03-03 RX ADMIN — LIDOCAINE HYDROCHLORIDE 40 MG: 20 INJECTION, SOLUTION INTRAVENOUS at 10:13

## 2025-03-03 RX ADMIN — PROPOFOL 150 MG: 10 INJECTION, EMULSION INTRAVENOUS at 10:13

## 2025-03-03 RX ADMIN — SODIUM CHLORIDE, POTASSIUM CHLORIDE, SODIUM LACTATE AND CALCIUM CHLORIDE: 600; 310; 30; 20 INJECTION, SOLUTION INTRAVENOUS at 10:11

## 2025-03-03 NOTE — H&P
Chief Complaint   Patient presents with   • Establish Care   • Physical         History of Present Illness    30-year-old gentleman presents to establish care and for physical  States has been having excessive tiredness.  Past history of hypothyroidism  off of his 25 mcg of levothyroxine for the last 4-6 months.  He moved from [place] in 2017. Father history of myocardial infarction in his 40s.  His last physical exam was 2018.        Past Medical History:   Diagnosis Date   • Hypothyroid        No past surgical history on file.    Family History   Problem Relation Age of Onset   • Heart disease Father 47        MI   • Thyroid Father    • Heart disease Paternal Grandfather         MI       ALLERGIES:  No Known Allergies    Social History     Socioeconomic History   • Marital status: /Civil Union     Spouse name: Not on file   • Number of children: Not on file   • Years of education: Not on file   • Highest education level: Not on file   Occupational History   • Occupation: soft ware enginner     Comment: contract   Social Needs   • Financial resource strain: Not on file   • Food insecurity     Worry: Not on file     Inability: Not on file   • Transportation needs     Medical: Not on file     Non-medical: Not on file   Tobacco Use   • Smoking status: Never Smoker   • Smokeless tobacco: Never Used   Substance and Sexual Activity   • Alcohol use: Yes     Alcohol/week: 2.0 standard drinks     Types: 2 Cans of beer per week     Frequency: Monthly or less     Drinks per session: 1 or 2     Binge frequency: Never   • Drug use: Never   • Sexual activity: Not Currently     Partners: Female   Lifestyle   • Physical activity     Days per week: Not on file     Minutes per session: Not on file   • Stress: Not on file   Relationships   • Social connections     Talks on phone: Not on file     Gets together: Not on file     Attends Orthodoxy service: Not on file     Active member of club or organization: Not on file      [facility]   HISTORY AND PHYSICAL    Patient Name: Agustin Stubbs  : 1975  MRN: [id number]  Primary Care Physician:  Indy Moffett APRN  Date of admission: 3/3/2025    Subjective   Subjective     Chief Complaint: Colon cancer screening #2 GERD    HPI:    Agustin Stubbs is a 49 y.o. male who presents for colon cancer screening and with GERD symptoms.    Review of Systems   Respiratory:  Negative for shortness of breath.    Cardiovascular:  Negative for chest pain.       Personal History     Past Medical History:   Diagnosis Date    Acid reflux     Chronic allergic rhinitis     Head injury     High blood pressure     Hyperlipemia     Hypertension     No pertinent past medical history     Seizures        Past Surgical History:   Procedure Laterality Date    COLONOSCOPY      COLONOSCOPY N/A 2021    Procedure: COLONOSCOPY with possible biopies.;  Surgeon: Aaron Perla MD;  Location: [facility] ENDOSCOPY;  Service: General;  Laterality: N/A;  COLON POLYPS       Family History: family history includes Breast cancer (age of onset: 30) in an other family member; Cancer in his brother and mother; Diabetes in his father and mother; Lung cancer in an other family member; Other in his father. Otherwise pertinent FHx was reviewed and not pertinent to current issue.    Social History:  reports that he has been smoking cigarettes. He has been exposed to tobacco smoke. He has never used smokeless tobacco. He reports current alcohol use. He reports that he does not currently use drugs.    Home Medications:  Lacosamide, fenofibrate, fluticasone, lacosamide, metoprolol succinate XL, mupirocin, polyethylene glycol, and rosuvastatin    Allergies:  Allergies   Allergen Reactions    Penicillins Unknown - High Severity       Objective    Objective     Vitals:        Physical Exam  HENT:      Head: Normocephalic.   Cardiovascular:      Rate and Rhythm: Normal rate.   Pulmonary:      Effort: Pulmonary effort is  Attends meetings of clubs or organizations: Not on file     Relationship status: Not on file   • Intimate partner violence     Fear of current or ex partner: Not on file     Emotionally abused: Not on file     Physically abused: Not on file     Forced sexual activity: Not on file   Other Topics Concern   •  Service Not Asked   • Blood Transfusions Not Asked   • Caffeine Concern No   • Occupational Exposure Not Asked   • Hobby Hazards Not Asked   • Sleep Concern No   • Stress Concern No   • Weight Concern Yes   • Special Diet Yes     Comment: vegetarian   • Back Care Not Asked   • Exercise Yes     Comment: strength/jump rope. walk daily   • Bike Helmet Not Asked   • Seat Belt Not Asked   • Self-Exams Not Asked   Social History Narrative    Lives roommate.  Wife lives  In [place]       No current outpatient medications on file.     No current facility-administered medications for this visit.        No results found for any previous visit.       Review of systems    Constitutional: Denies fever, chills, sweats, change in sleep, or weight change. No recent infections. Denies hot flashes, excessive thirst or excessive urination.  Gained weight easily  Hematologic: Denies abnormal bleeding, easy bruising.  HEENT: Denies nose bleeds, hoarseness, sores in the mouth or throat, or ringing in the ears. No memory loss or change. No numbness, tingling, weakness or headaches. No blurred or double vision.  Cardiorespiratory: No chronic cough, wheezing or chest pain. No shortness of breath, fainting, dyspnea on exertion or palpitations.  Gastrointestinal: Denies diarrhea, nausea, vomiting, dysphagia, stomach pain or cramping, change in appetite, heartburn, OR bloody stools.  Genitourinary: Denies dysuria, frequency, hematuria, nocturia, incontinence or hesitancy of urination. Denies any penile discharge.  Musculoskeletal: No joint pain or swelling. Denies back pain or stiffness.  Neurological: No focal numbness, weakness,  normal.   Abdominal:      Palpations: Abdomen is soft.   Musculoskeletal:         General: Normal range of motion.      Cervical back: Normal range of motion.   Skin:     General: Skin is warm.   Neurological:      General: No focal deficit present.      Mental Status: He is alert.   Psychiatric:         Mood and Affect: Mood normal.         Result Review    Result Review:  I have personally reviewed the results from the time of this admission to 3/3/2025 08:52 EST and agree with these findings:  []  Laboratory  []  Microbiology  []  Radiology  []  EKG/Telemetry   []  Cardiology/Vascular   []  Pathology  []  Old records  []  Other:  Most notable findings include:     Assessment & Plan   Assessment / Plan     Brief Patient Summary:  Agustin Stubbs is a 49 y.o. male who presents for colon cancer screening and with GERD symptoms.    Active Hospital Problems:  Active Hospital Problems    Diagnosis     **Gastroesophageal reflux disease with esophagitis without hemorrhage     Screening for malignant neoplasm of colon     History of colonic polyps        Plan:   We will proceed with an EGD and colonoscopy.  Risk benefits and alternatives were explained.    VTE Prophylaxis:  No VTE prophylaxis order currently exists.        CODE STATUS:         Admission Status:  I believe this patient meets outpatient status.    Electronically signed by Aaron Perla MD, 03/03/25, 8:52 AM EST.   paralysis or paresthesias.  Skin:  Denies change in moles, denies rashes.  Psychiatric:  Denies problems with depression or anxiety.      Physical exam  Visit Vitals  /82 (BP Location: RUE - Right upper extremity, Patient Position: Sitting, Cuff Size: Regular)   Pulse (!) 111   Temp 97.7 °F (36.5 °C) (Temporal)   Resp 18   Ht 5' 6\" (1.676 m)   Wt 81.6 kg   SpO2 97%   BMI 29.02 kg/m²         HEENT: Normocephalic and atraumatic. Pupils are equal round and reactive to light and accommodation. Extraocular movements are intact. Conjunctivae and lids are clear. Sclerae are anicteric. Fundi are without vessel changes. Optic discs are clear. Ears and nose: Auditory canals are clear. Tympanic membranes are normal. Ears and nose are without lesions. Nasal mucosa is clear. Mouth: Teeth and gums are in fair condition. Mucous membranes are moist. Oral mucosa and pharynx are clear. No oral lesions are evident.  Neck: Supple. No masses. Trachea is midline. There is no thyroid enlargement, masses or nodularity.  No lymphadenopathy to cervical or supraclavicular areas.  Respiratory: Chest is normal in appearance. There is no chest wall tenderness. Lungs are clear to auscultation bilaterally without wheezes, rales or rhonchi. Respiratory effort is within normal limits.  Cardiovascular: Heart sounds S1-S2 with regular rate and rhythm. There are no murmurs or extra heart sounds. There are no carotid bruits. There is no peripheral edema. Pedal pulses are palpable and symmetrical. No JVD.  Abdomen: Soft and nontender without any rebound, guarding or masses evident. Bowel sounds are present and normal. No hernias or hepatosplenomegaly.  Genitourinary:  Normal bilateral femoral pulses.  Testes descended, without masses.  No hernia noted.    Musculoskeletal: No peripheral swelling. No scoliosis, kyphosis or lordosis is appreciated. Bilateral upper and lower extremities are nontender to palpation, without deformity and symmetrical in  strength.   Patient has good peripheral pulses.   Skin: Warm and dry without rashes.  Denies any suspicious lesions or ulcers. Neurological: Sensory motor function is grossly within normal limits. Gait is normal. Deep tendon reflexes are symmetrical and intact.  Over the last 2 weeks, how often have you been bothered by the following problems?          PHQ2 Score: 0  PHQ2 Score Interpretation: No further screening needed  1. Little interest or pleasure in activity?: 0  2. Feeling down, depressed, or hopeless?: 0         DEPRESSION ASSESSMENT/PLAN:  Depression screening is negative no further plan needed.     Chaz was seen today for establish care and physical.    Diagnoses and all orders for this visit:    Physical exam, routine  -     FREE T4; Future  -     FREE T3; Future  -     THYROID STIMULATING HORMONE; Future  -     LIPID PANEL WITHOUT REFLEX    Need for vaccination    Fatigue, unspecified type  -     CBC NO DIFFERENTIAL; Future  -     FREE T4; Future  -     FREE T3; Future  -     THYROID STIMULATING HORMONE; Future    History of hypothyroidism  -     FREE T3; Future  -     THYROID STIMULATING HORMONE; Future        Plan:  Health maintenance recommendations reviewed.  Self-testicular exams recommended.  Labs and orders include:  As above.  Test results will be conveyed through telephone or mail.  Call the clinic if he has not received the results in two weeks.  Advised of healthy lifestyle to include, low fat diet rich in fiber, increase activity to 150 minutes weekly, avoid smoking and limit alcohol intake.  Concerned thyroid underactive and probable cause for fatigue.  Will notify of test results.  Declined vaccines  Tetanus is up to date per his report, completed in [place]  Regular exercise regimen:   150 minutes of cardiovascular exercise per week at minimum   Light weight training to improve bone health     Dietary Supplementation:  Vitamin D3 4677-4775 units daily with K2 in gel cap form  Calcium 1000  mg daily supplement, unless getting 3 servings of dairy daily in your diet  Omega 3 Fatty Acids 1000 mg daily, fish oil, walnuts, hemp, julieth or flax seeds (put fish oil in the freezer to avoid \"fish burp\")  Multivitamin daily  ** Recommend all supplements have USP label ([place] Pharmacopeia)     Health Screening & Maintenance:  Monthly self breast exams  Dental visit every 6 months  Yearly flu vaccine  Cervical cancer screening (Pap smear) every 3-5 years (age 21+)  Mammogram every 1-2 years (age 40+)  Colonoscopy (age 50+)  Bone density screening (age 65+)  Lung cancer screening (if current or prior smoking history, age 55+)  Yearly Medicare Wellness Visit (age 65+)     Relaxation/Stress Management:  Figure out what works for you:  Yoga, deep breathing, exercise, mindfulness meditation  Guided Imagery Meditation  Resources:  www.[email or web address].org go to podcasts  www.[email or web address]    [email or web address]         Health Care Planning:  Complete an Advanced Directive- Health Care Power of   Https://www.[facility].[place].gov/forms/advdirectives/index.htm  Bring a copy to the clinic to be put in your chart.       Supervision of:  Dr Bello

## 2025-03-03 NOTE — ANESTHESIA POSTPROCEDURE EVALUATION
Patient: Agustin Stubbs    Procedure Summary       Date: 03/03/25 Room / Location: [facility] ENDOSCOPY 3 / [facility] ENDOSCOPY    Anesthesia Start: 1011 Anesthesia Stop: 1103    Procedures:       ESOPHAGOGASTRODUODENOSCOPY WITH BIOPSIES      COLONOSCOPY WITH COLD SNARE POLYPECTOMIES, TATTOO APPLICATION Diagnosis:       Gastroesophageal reflux disease with esophagitis without hemorrhage      Screening for malignant neoplasm of colon      History of colonic polyps      (Gastroesophageal reflux disease with esophagitis without hemorrhage [K21.00])      (Screening for malignant neoplasm of colon [Z12.11])      (History of colonic polyps [Z86.0100])    Surgeons: Aaron Perla MD Provider: Oneida Claudio CRNA    Anesthesia Type: general ASA Status: 3            Anesthesia Type: general    Vitals  Vitals Value Taken Time   /87 03/03/25 1112   Temp 37 °C (98.6 °F) 03/03/25 1059   Pulse 93 03/03/25 1113   Resp 16 03/03/25 1109   SpO2 93 % 03/03/25 1113   Vitals shown include unfiled device data.        Post Anesthesia Care and Evaluation    Patient location during evaluation: bedside  Patient participation: complete - patient participated  Level of consciousness: awake  Pain management: adequate    Airway patency: patent  Anesthetic complications: No anesthetic complications  PONV Status: controlled  Cardiovascular status: acceptable and stable  Respiratory status: acceptable

## 2025-03-11 ENCOUNTER — TELEPHONE (OUTPATIENT)
Dept: SURGERY | Facility: CLINIC | Age: 50
End: 2025-03-11
Payer: COMMERCIAL

## 2025-03-17 ENCOUNTER — TELEPHONE (OUTPATIENT)
Dept: GASTROENTEROLOGY | Facility: CLINIC | Age: 50
End: 2025-03-17
Payer: COMMERCIAL

## 2025-03-19 ENCOUNTER — PREP FOR SURGERY (OUTPATIENT)
Dept: OTHER | Facility: HOSPITAL | Age: 50
End: 2025-03-19
Payer: COMMERCIAL

## 2025-03-19 DIAGNOSIS — Z12.11 ENCOUNTER FOR COLONOSCOPY FOLLOWING COLON POLYP REMOVAL: ICD-10-CM

## 2025-03-19 DIAGNOSIS — K63.5 POLYP OF SIGMOID COLON, UNSPECIFIED TYPE: Primary | ICD-10-CM

## 2025-03-19 DIAGNOSIS — Z86.0100 ENCOUNTER FOR COLONOSCOPY FOLLOWING COLON POLYP REMOVAL: ICD-10-CM

## 2025-03-19 NOTE — TELEPHONE ENCOUNTER
S/w pt. He is agreeable to completing Colonoscopy with Dr. Higgins.     Pt advised that he does have a hx of seizures so I let him know I could only eRX 4L prep. He is agreeable. Once I have signed CR, I will call pt back at review bowel prep with him.     CR Created --- we are hoping to get patient added on to 3.24.25.

## 2025-03-21 ENCOUNTER — ANESTHESIA EVENT (OUTPATIENT)
Dept: GASTROENTEROLOGY | Facility: HOSPITAL | Age: 50
End: 2025-03-21
Payer: COMMERCIAL

## 2025-03-21 NOTE — PRE-PROCEDURE INSTRUCTIONS
"Instructed on date and arrival time of 1200. Instructed that arrival time is not their procedure time but allows time to prepare for procedure.  Come to entrance \"C\". Must have  over age 18 to drive home.  May have two visitors; however, children under 12 must stay in waiting room.  Discussed clear liquid diet (no red or purple), bowel prep, and NPO.  May take medications as usual except for blood thinners, diabetic medications, and weight loss medications.  Verbalized understanding of instructions given.  Instructed to call for questions or concerns.  " CAD (coronary artery disease)

## 2025-03-21 NOTE — ANESTHESIA PREPROCEDURE EVALUATION
Anesthesia Evaluation     Patient summary reviewed and Nursing notes reviewed   NPO Solid Status: > 8 hours  NPO Liquid Status: > 4 hours           Airway   Mallampati: II  TM distance: >3 FB  Neck ROM: full  No difficulty expected  Dental - normal exam     Pulmonary - normal exam    breath sounds clear to auscultation  (+) a smoker Current, Smoked day of surgery, cigarettes,  Cardiovascular - normal exam    ECG reviewed  Rhythm: regular  Rate: normal    (+) hypertension, hyperlipidemia    ROS comment: EKG 9/27/23  Sinus tachycardia   RBBB    Neuro/Psych  (+) seizures (last seizure sept 2023 - no seizures since - does not take medication), psychiatric history    ROS Comment: TBI 2016  GI/Hepatic/Renal/Endo    (+) GERD    Musculoskeletal     Abdominal    Substance History      OB/GYN          Other                      Anesthesia Plan    ASA 2     general   total IV anesthesia  (Total IV Anesthesia    Patient understands anesthesia not responsible for dental damage.  )  intravenous induction     Anesthetic plan, risks, benefits, and alternatives have been provided, discussed and informed consent has been obtained with: patient and spouse/significant other.  Pre-procedure education provided  Use of blood products discussed with patient and spouse/significant other  Consented to blood products.    Plan discussed with CRNA.      CODE STATUS:

## 2025-03-21 NOTE — TELEPHONE ENCOUNTER
Pt is scheduled for 3.24.25 @ 12:00 pm.     I have sent pt instructions on 5 CUPS and some sugart.    What Type Of Note Output Would You Prefer (Optional)?: Bullet Format Is This A New Presentation, Or A Follow-Up?: Skin Lesions Which Family Member (Optional)?: Brother

## 2025-03-24 ENCOUNTER — ANESTHESIA (OUTPATIENT)
Dept: GASTROENTEROLOGY | Facility: HOSPITAL | Age: 50
End: 2025-03-24
Payer: COMMERCIAL

## 2025-03-24 ENCOUNTER — HOSPITAL ENCOUNTER (OUTPATIENT)
Facility: HOSPITAL | Age: 50
Setting detail: HOSPITAL OUTPATIENT SURGERY
Discharge: HOME | End: 2025-03-24
Attending: INTERNAL MEDICINE | Admitting: INTERNAL MEDICINE
Payer: COMMERCIAL

## 2025-03-24 VITALS
HEART RATE: 80 BPM | DIASTOLIC BLOOD PRESSURE: 88 MMHG | SYSTOLIC BLOOD PRESSURE: 141 MMHG | OXYGEN SATURATION: 96 % | BODY MASS INDEX: 32.39 KG/M2 | RESPIRATION RATE: 20 BRPM | WEIGHT: 219.36 LBS | TEMPERATURE: 97.1 F

## 2025-03-24 DIAGNOSIS — K63.5 POLYP OF SIGMOID COLON, UNSPECIFIED TYPE: ICD-10-CM

## 2025-03-24 PROCEDURE — 25010000002 LIDOCAINE PF 2% 2 % SOLUTION: Performed by: NURSE ANESTHETIST, CERTIFIED REGISTERED

## 2025-03-24 PROCEDURE — 45390 COLONOSCOPY W/RESECTION: CPT | Performed by: INTERNAL MEDICINE

## 2025-03-24 PROCEDURE — 45385 COLONOSCOPY W/LESION REMOVAL: CPT | Performed by: INTERNAL MEDICINE

## 2025-03-24 PROCEDURE — 25810000003 LACTATED RINGERS PER 1000 ML: Performed by: NURSE ANESTHETIST, CERTIFIED REGISTERED

## 2025-03-24 PROCEDURE — 25010000002 PROPOFOL 10 MG/ML EMULSION: Performed by: NURSE ANESTHETIST, CERTIFIED REGISTERED

## 2025-03-24 PROCEDURE — 88305 TISSUE EXAM BY PATHOLOGIST: CPT | Performed by: INTERNAL MEDICINE

## 2025-03-24 DEVICE — DEV CLIP HEMO RESOLUTION360/ULTR 235CM 17MM STRL: Type: IMPLANTABLE DEVICE | Site: SIGMOID COLON | Status: FUNCTIONAL

## 2025-03-24 RX ORDER — PROPOFOL 10 MG/ML
VIAL (ML) INTRAVENOUS CONTINUOUS PRN
Status: DISCONTINUED | OUTPATIENT
Start: 2025-03-24 | End: 2025-03-24 | Stop reason: SURG

## 2025-03-24 RX ORDER — SODIUM CHLORIDE, SODIUM LACTATE, POTASSIUM CHLORIDE, CALCIUM CHLORIDE 600; 310; 30; 20 MG/100ML; MG/100ML; MG/100ML; MG/100ML
INJECTION, SOLUTION INTRAVENOUS CONTINUOUS PRN
Status: DISCONTINUED | OUTPATIENT
Start: 2025-03-24 | End: 2025-03-24 | Stop reason: SURG

## 2025-03-24 RX ORDER — LIDOCAINE HYDROCHLORIDE 20 MG/ML
INJECTION, SOLUTION EPIDURAL; INFILTRATION; INTRACAUDAL; PERINEURAL AS NEEDED
Status: DISCONTINUED | OUTPATIENT
Start: 2025-03-24 | End: 2025-03-24 | Stop reason: SURG

## 2025-03-24 RX ORDER — KETAMINE HYDROCHLORIDE 10 MG/ML
INJECTION, SOLUTION INTRAMUSCULAR; INTRAVENOUS AS NEEDED
Status: DISCONTINUED | OUTPATIENT
Start: 2025-03-24 | End: 2025-03-24 | Stop reason: SURG

## 2025-03-24 RX ORDER — DEXMEDETOMIDINE HYDROCHLORIDE 100 UG/ML
INJECTION, SOLUTION INTRAVENOUS AS NEEDED
Status: DISCONTINUED | OUTPATIENT
Start: 2025-03-24 | End: 2025-03-24 | Stop reason: SURG

## 2025-03-24 RX ADMIN — LIDOCAINE HYDROCHLORIDE 50 MG: 20 INJECTION, SOLUTION EPIDURAL; INFILTRATION; INTRACAUDAL; PERINEURAL at 15:01

## 2025-03-24 RX ADMIN — KETAMINE HYDROCHLORIDE 25 MG: 10 INJECTION INTRAMUSCULAR; INTRAVENOUS at 15:05

## 2025-03-24 RX ADMIN — PROPOFOL 80 MG: 10 INJECTION, EMULSION INTRAVENOUS at 15:01

## 2025-03-24 RX ADMIN — DEXMEDETOMIDINE 10 MCG: 100 INJECTION, SOLUTION, CONCENTRATE INTRAVENOUS at 15:03

## 2025-03-24 RX ADMIN — SODIUM CHLORIDE, POTASSIUM CHLORIDE, SODIUM LACTATE AND CALCIUM CHLORIDE: 600; 310; 30; 20 INJECTION, SOLUTION INTRAVENOUS at 14:58

## 2025-03-24 RX ADMIN — PROPOFOL 250 MCG/KG/MIN: 10 INJECTION, EMULSION INTRAVENOUS at 15:02

## 2025-03-24 NOTE — H&P
Pre Procedure History & Physical    Chief Complaint:   Colon polyps seen recently on colonoscopy which was not fully removed    Subjective     HPI:   As above    Past Medical History:   Past Medical History:   Diagnosis Date    Acid reflux     Chronic allergic rhinitis     Head injury     High blood pressure     Hyperlipemia     Hypertension     No pertinent past medical history     Seizures        Past Surgical History:  Past Surgical History:   Procedure Laterality Date    COLONOSCOPY      COLONOSCOPY N/A 12/20/2021    Procedure: COLONOSCOPY with possible biopies.;  Surgeon: Aaron Perla MD;  Location: [facility] ENDOSCOPY;  Service: General;  Laterality: N/A;  COLON POLYPS    COLONOSCOPY N/A 3/3/2025    Procedure: ESOPHAGOGASTRODUODENOSCOPY WITH BIOPSIES;  Surgeon: Aaron Perla MD;  Location: [facility] ENDOSCOPY;  Service: General;  Laterality: N/A;  ESOPHAGITIS    ENDOSCOPY N/A 3/3/2025    Procedure: COLONOSCOPY WITH COLD SNARE POLYPECTOMIES, TATTOO APPLICATION;  Surgeon: Aaron Perla MD;  Location: [facility] ENDOSCOPY;  Service: General;  Laterality: N/A;  COLON POLYPS       Family History:  Family History   Problem Relation Age of Onset    Cancer Mother     Diabetes Mother     Other Father         RENAL CALCULUS    Diabetes Father     Cancer Brother     Breast cancer Other 30        AUNT    Lung cancer Other         UNCLE    Malig Hyperthermia Neg Hx        Social History:   reports that he has been smoking cigarettes. He has been exposed to tobacco smoke. He has never used smokeless tobacco. He reports that he does not currently use alcohol. He reports that he does not currently use drugs.    Medications:   Medications Prior to Admission   Medication Sig Dispense Refill Last Dose/Taking    fluticasone (FLONASE) 50 MCG/ACT nasal spray 2 sprays into the nostril(s) as directed by provider Daily. (Patient not taking: Reported on 12/17/2024) 11.1 mL 1     metoprolol succinate XL (TOPROL-XL) 25 MG 24 hr tablet  TAKE 1 TABLET BY MOUTH DAILY 30 tablet 0     polyethylene glycol (GoLYTELY) 236 g solution Take per office instructions 4000 mL 0        Allergies:  Penicillins        Objective     Blood pressure 139/88, pulse 87, temperature 97 °F (36.1 °C), temperature source Temporal, resp. rate 14, weight 99.5 kg (219 lb 5.7 oz), SpO2 97%.    Physical Exam   Constitutional: Pt is oriented to person, place, and time and well-developed, well-nourished, and in no distress.   Mouth/Throat: Oropharynx is clear and moist.   Neck: Normal range of motion.   Cardiovascular: Normal rate, regular rhythm and normal heart sounds.    Pulmonary/Chest: Effort normal and breath sounds normal.   Abdominal: Soft. Nontender  Skin: Skin is warm and dry.   Psychiatric: Mood, memory, affect and judgment normal.     Assessment & Plan     Diagnosis; colon polyp    Anticipated Surgical Procedure:  Colonoscopy    The risks, benefits, and alternatives of this procedure have been discussed with the patient or the responsible party- the patient understands and agrees to proceed.

## 2025-03-24 NOTE — ANESTHESIA POSTPROCEDURE EVALUATION
Patient: Agustin Stubbs    Procedure Summary       Date: 03/24/25 Room / Location: [facility] ENDOSCOPY 4 / [facility] ENDOSCOPY    Anesthesia Start: 1458 Anesthesia Stop: 1540    Procedure: COLONOSCOPY with hot and cold snare polypectpomies,eleview injection, clip application x 3 Diagnosis:       Polyp of sigmoid colon, unspecified type      (Polyp of sigmoid colon, unspecified type [K63.5])    Surgeons: Arya Higgins MD Provider: Adilene Irvin CRNA    Anesthesia Type: general ASA Status: 2            Anesthesia Type: general    Vitals  Vitals Value Taken Time   /88 03/24/25 15:57   Temp 36.2 °C (97.1 °F) 03/24/25 15:37   Pulse 86 03/24/25 15:58   Resp 20 03/24/25 15:57   SpO2 96 % 03/24/25 15:58   Vitals shown include unfiled device data.        Post Anesthesia Care and Evaluation    Patient location during evaluation: bedside  Patient participation: complete - patient participated  Level of consciousness: awake  Pain management: adequate    Airway patency: patent  Anesthetic complications: No anesthetic complications  PONV Status: controlled  Cardiovascular status: acceptable and stable  Respiratory status: acceptable  Hydration status: acceptable

## 2025-03-25 RX ORDER — METOPROLOL SUCCINATE 25 MG/1
25 TABLET, EXTENDED RELEASE ORAL
Qty: 30 TABLET | Refills: 0 | Status: SHIPPED | OUTPATIENT
Start: 2025-03-25

## 2025-03-27 ENCOUNTER — RESULTS FOLLOW-UP (OUTPATIENT)
Dept: GASTROENTEROLOGY | Facility: HOSPITAL | Age: 50
End: 2025-03-27
Payer: COMMERCIAL

## 2025-03-27 PROBLEM — Z12.11 ENCOUNTER FOR COLONOSCOPY FOLLOWING COLON POLYP REMOVAL: Status: ACTIVE | Noted: 2025-03-19

## 2025-03-27 PROBLEM — Z86.0100 ENCOUNTER FOR COLONOSCOPY FOLLOWING COLON POLYP REMOVAL: Status: ACTIVE | Noted: 2025-03-19

## 2025-03-27 RX ORDER — METOPROLOL SUCCINATE 25 MG/1
25 TABLET, EXTENDED RELEASE ORAL
Qty: 30 TABLET | Refills: 0 | OUTPATIENT
Start: 2025-03-27

## 2025-03-27 RX ORDER — POLYETHYLENE GLYCOL 3350, SODIUM CHLORIDE, SODIUM BICARBONATE, POTASSIUM CHLORIDE 420; 11.2; 5.72; 1.48 G/4L; G/4L; G/4L; G/4L
4000 POWDER, FOR SOLUTION ORAL ONCE
Qty: 4000 ML | Refills: 0 | Status: SHIPPED | OUTPATIENT
Start: 2025-03-27 | End: 2025-03-27

## 2025-03-27 NOTE — TELEPHONE ENCOUNTER
Hub to relay & schedule      Called patient to schedule annual for further refills. LVM x3

## 2025-04-04 ENCOUNTER — TELEPHONE (OUTPATIENT)
Dept: SURGERY | Facility: CLINIC | Age: 50
End: 2025-04-04
Payer: COMMERCIAL

## 2025-04-07 NOTE — TELEPHONE ENCOUNTER
Spoke to patient and informed of [facility] result note and recommendations. Verified patient understanding.    Patient agreeable to repeat colonoscopy.  States he already has picked up bowel prep from pharmacy.    Verified no clearances are needed.    Ready to schedule.  Prefers earliest possible on a Monday.

## 2025-04-07 NOTE — TELEPHONE ENCOUNTER
Attempted to contact patient, left voicemail message to return call. Provided direct contact information.    Colonoscopy is scheduled with Dr. Higgins on Monday, 07.28.25 with arrival time of 0600.    [place] IOt message sent.

## 2025-04-09 NOTE — TELEPHONE ENCOUNTER
Attempted to contact patient, left voicemail message to return call. Provided direct contact information.

## 2025-04-14 NOTE — TELEPHONE ENCOUNTER
Spoke with patient and informed of scheduled colonoscopy:    Monday, 07.28.25 with arrival time of 0600.  Mailed prep brochure.  Patient states he has already picked up Golytely.  Reminded of need for a  post-procedure.  Patient verbalized understanding.

## 2025-04-30 RX ORDER — METOPROLOL SUCCINATE 25 MG/1
25 TABLET, EXTENDED RELEASE ORAL
Qty: 15 TABLET | Refills: 0 | Status: SHIPPED | OUTPATIENT
Start: 2025-04-30

## 2025-04-30 NOTE — TELEPHONE ENCOUNTER
Only gave pt 15 pills has not been her in a year next fill will only be one week if does not schedule

## 2025-04-30 NOTE — TELEPHONE ENCOUNTER
Beta-Blockers Protocol [id number]/30/2025 06:07 AM   Protocol Details Recent or future visit with authorizing provider    BP under 140/90 in past year     Several attempts to reach pt to schedule appt.

## 2025-07-14 NOTE — PAT
Reviewed the following with patient.    Arrival time of 0600.    Must have  over 18 for transportation home post procedure. Come to [facility], entrance C.     Education provided on laxative administration; bowel prep to be taken in two doses. Reviewed diet instructions for day prior to procedure. Only plain, unflavored water after midnight until two hours prior to arrival time.     Do not take any morning medications on the day of the procedure. Instead bring all prescribed medication and inhalers to the hospital the morning of the procedure. May take any nebulizer treatments or inhalers the morning of the procedure.     Plan to be here 3-4 hours.   Do not bring any valuables to hospital with you. Call Endo department for any questions.     Pt verbalized understanding of instructions.

## 2025-07-25 ENCOUNTER — ANESTHESIA EVENT (OUTPATIENT)
Dept: GASTROENTEROLOGY | Facility: HOSPITAL | Age: 50
End: 2025-07-25
Payer: COMMERCIAL

## 2025-07-25 NOTE — ANESTHESIA PREPROCEDURE EVALUATION
Anesthesia Evaluation     Patient summary reviewed and Nursing notes reviewed   NPO Solid Status: > 8 hours  NPO Liquid Status: > 4 hours           Airway   Mallampati: II  TM distance: <3 FB  Neck ROM: full  No difficulty expected and Large neck circumference  Dental - normal exam     Pulmonary     breath sounds clear to auscultation  (+) a smoker (current) Current, Smoked day of surgery, cigarettes,  Cardiovascular - normal exam  Exercise tolerance: good (4-7 METS)    ECG reviewed  Patient on routine beta blocker  Rhythm: regular  Rate: normal    (+) hypertension well controlled, hyperlipidemia      Neuro/Psych  (+) seizures (one time incident in 2022- no meds now) well controlled, psychiatric history  GI/Hepatic/Renal/Endo    (+) obesity, GERD well controlled    Musculoskeletal     Abdominal    Substance History   (+) alcohol use (clean for 2 years)     OB/GYN          Other        ROS/Med Hx Other: Hx polyps    EKG 09/27/23; ,   Sinus tachycardia  Right bundle branch block  Borderline ST elevation, lateral leads                      Anesthesia Plan    ASA 3     general   total IV anesthesia  (Total IV Anesthesia    Patient understands anesthesia not responsible for dental damage      Discussed risks with pt including aspiration, allergic reactions, apnea, advanced airway placement. Pt verbalized understanding. All questions answered.     )  intravenous induction     Anesthetic plan, risks, benefits, and alternatives have been provided, discussed and informed consent has been obtained with: patient and spouse/significant other.  Pre-procedure education provided  Plan discussed with CRNA.      CODE STATUS:

## 2025-07-28 ENCOUNTER — ANESTHESIA (OUTPATIENT)
Dept: GASTROENTEROLOGY | Facility: HOSPITAL | Age: 50
End: 2025-07-28
Payer: COMMERCIAL

## 2025-07-28 ENCOUNTER — HOSPITAL ENCOUNTER (OUTPATIENT)
Facility: HOSPITAL | Age: 50
Setting detail: HOSPITAL OUTPATIENT SURGERY
Discharge: HOME | End: 2025-07-28
Attending: INTERNAL MEDICINE | Admitting: INTERNAL MEDICINE
Payer: COMMERCIAL

## 2025-07-28 VITALS
SYSTOLIC BLOOD PRESSURE: 134 MMHG | OXYGEN SATURATION: 98 % | RESPIRATION RATE: 18 BRPM | HEART RATE: 94 BPM | BODY MASS INDEX: 33.17 KG/M2 | TEMPERATURE: 97.8 F | DIASTOLIC BLOOD PRESSURE: 91 MMHG | WEIGHT: 224.65 LBS

## 2025-07-28 DIAGNOSIS — Z86.0100 ENCOUNTER FOR COLONOSCOPY FOLLOWING COLON POLYP REMOVAL: ICD-10-CM

## 2025-07-28 DIAGNOSIS — K63.5 POLYP OF SIGMOID COLON, UNSPECIFIED TYPE: ICD-10-CM

## 2025-07-28 DIAGNOSIS — Z12.11 ENCOUNTER FOR COLONOSCOPY FOLLOWING COLON POLYP REMOVAL: ICD-10-CM

## 2025-07-28 PROCEDURE — 25010000002 PROPOFOL 10 MG/ML EMULSION: Performed by: NURSE ANESTHETIST, CERTIFIED REGISTERED

## 2025-07-28 PROCEDURE — 25810000003 LACTATED RINGERS PER 1000 ML

## 2025-07-28 PROCEDURE — 88305 TISSUE EXAM BY PATHOLOGIST: CPT | Performed by: INTERNAL MEDICINE

## 2025-07-28 DEVICE — DEV CLIP HEMO RESOLUTION360/ULTR 235CM 17MM STRL: Type: IMPLANTABLE DEVICE | Site: ASCENDING COLON | Status: FUNCTIONAL

## 2025-07-28 RX ORDER — SODIUM CHLORIDE, SODIUM LACTATE, POTASSIUM CHLORIDE, CALCIUM CHLORIDE 600; 310; 30; 20 MG/100ML; MG/100ML; MG/100ML; MG/100ML
30 INJECTION, SOLUTION INTRAVENOUS CONTINUOUS
Status: ACTIVE | OUTPATIENT
Start: 2025-07-28 | End: 2025-07-28

## 2025-07-28 RX ORDER — RED BEET 500 MG
1 CAPSULE ORAL 2 TIMES DAILY
COMMUNITY

## 2025-07-28 RX ORDER — PROPOFOL 10 MG/ML
VIAL (ML) INTRAVENOUS AS NEEDED
Status: DISCONTINUED | OUTPATIENT
Start: 2025-07-28 | End: 2025-07-28 | Stop reason: SURG

## 2025-07-28 RX ADMIN — PROPOFOL 40 MG: 10 INJECTION, EMULSION INTRAVENOUS at 08:07

## 2025-07-28 RX ADMIN — PROPOFOL 200 MCG/KG/MIN: 10 INJECTION, EMULSION INTRAVENOUS at 07:42

## 2025-07-28 RX ADMIN — PROPOFOL 120 MG: 10 INJECTION, EMULSION INTRAVENOUS at 07:41

## 2025-07-28 RX ADMIN — SODIUM CHLORIDE, POTASSIUM CHLORIDE, SODIUM LACTATE AND CALCIUM CHLORIDE 30 ML/HR: 600; 310; 30; 20 INJECTION, SOLUTION INTRAVENOUS at 06:32

## 2025-07-28 NOTE — H&P
Pre Procedure History & Physical    Chief Complaint:   Past history of colon polyps    Subjective     HPI:   History of colon polyps    Past Medical History:   Past Medical History:   Diagnosis Date    Acid reflux     Chronic allergic rhinitis     Head injury     High blood pressure     Hyperlipemia     Hypertension     No pertinent past medical history     Seizures 2022 with quiting smoking and drinking alcohol       Past Surgical History:  Past Surgical History:   Procedure Laterality Date    COLONOSCOPY      COLONOSCOPY N/A 12/20/2021    Procedure: COLONOSCOPY with possible biopies.;  Surgeon: Aaron Perla MD;  Location: [facility] ENDOSCOPY;  Service: General;  Laterality: N/A;  COLON POLYPS    COLONOSCOPY N/A 3/3/2025    Procedure: ESOPHAGOGASTRODUODENOSCOPY WITH BIOPSIES;  Surgeon: Aaron Perla MD;  Location: [facility] ENDOSCOPY;  Service: General;  Laterality: N/A;  ESOPHAGITIS    COLONOSCOPY N/A 3/24/2025    Procedure: COLONOSCOPY with hot and cold snare polypectpomies,eleview injection, clip application x 3;  Surgeon: Arya Higgins MD;  Location: [facility] ENDOSCOPY;  Service: Gastroenterology;  Laterality: N/A;  colon polyps    ENDOSCOPY N/A 3/3/2025    Procedure: COLONOSCOPY WITH COLD SNARE POLYPECTOMIES, TATTOO APPLICATION;  Surgeon: Aaron Perla MD;  Location: [facility] ENDOSCOPY;  Service: General;  Laterality: N/A;  COLON POLYPS       Family History:  Family History   Problem Relation Age of Onset    Cancer Mother     Diabetes Mother     Diabetes Father     Cancer Brother     Colon cancer Maternal Uncle     Colon cancer Maternal Uncle     Breast cancer Other 30        AUNT    Lung cancer Other         UNCLE    Malig Hyperthermia Neg Hx        Social History:   reports that he has been smoking cigarettes. He started smoking about 34 years ago. He has a 51.9 pack-year smoking history. He has been exposed to tobacco smoke. He has never used smokeless tobacco. He reports that he does not  currently use alcohol. He reports that he does not currently use drugs.    Medications:   Medications Prior to Admission   Medication Sig Dispense Refill Last Dose/Taking    Misc Natural Products (Beet Root) 500 MG capsule Take 1 tablet by mouth 2 (Two) Times a Day.          Allergies:  Penicillins        Objective     Blood pressure 129/91, pulse 102, temperature 98.5 °F (36.9 °C), temperature source Temporal, resp. rate 20, weight 102 kg (224 lb 10.4 oz), SpO2 96%.    Physical Exam   Constitutional: Pt is oriented to person, place, and time and well-developed, well-nourished, and in no distress.   Mouth/Throat: Oropharynx is clear and moist.   Neck: Normal range of motion.   Cardiovascular: Normal rate, regular rhythm and normal heart sounds.    Pulmonary/Chest: Effort normal and breath sounds normal.   Abdominal: Soft. Nontender  Skin: Skin is warm and dry.   Psychiatric: Mood, memory, affect and judgment normal.     Assessment & Plan     Diagnosis:  Surveillance    Anticipated Surgical Procedure:  Colonoscopy    The risks, benefits, and alternatives of this procedure have been discussed with the patient or the responsible party- the patient understands and agrees to proceed.

## 2025-07-28 NOTE — ANESTHESIA POSTPROCEDURE EVALUATION
Patient: Agustin Stubbs    Procedure Summary       Date: 07/28/25 Room / Location: [facility] ENDOSCOPY 4 / [facility] ENDOSCOPY    Anesthesia Start: 0738 Anesthesia Stop: 0818    Procedure: COLONOSCOPY WITH COLD SNARE POLYPECTOMIES WITH CLIP APPLICATION X1 AND BIOPSIES Diagnosis:       Polyp of sigmoid colon, unspecified type      Encounter for colonoscopy following colon polyp removal      (Polyp of sigmoid colon, unspecified type [K63.5])      (Encounter for colonoscopy following colon polyp removal [Z12.11, Z86.0100])    Surgeons: Ayra Higgins MD Provider: Julissa Coronel CRNA    Anesthesia Type: general ASA Status: 3            Anesthesia Type: general    Vitals  Vitals Value Taken Time   /91 07/28/25 08:32   Temp 36.6 °C (97.8 °F) 07/28/25 08:32   Pulse 94 07/28/25 08:32   Resp 18 07/28/25 08:32   SpO2 98 % 07/28/25 08:32           Post Anesthesia Care and Evaluation    Level of consciousness: awake and alert  Pain management: satisfactory to patient    Airway patency: patent  Anesthetic complications: No anesthetic complications    Cardiovascular status: acceptable  Respiratory status: acceptable    Comments: Per chart review only

## 2025-07-29 LAB
CYTO UR: NORMAL
LAB AP CASE REPORT: NORMAL
LAB AP CLINICAL INFORMATION: NORMAL
PATH REPORT.FINAL DX SPEC: NORMAL
PATH REPORT.GROSS SPEC: NORMAL

## (undated) DEVICE — SOL IRR NACL 0.9PCT BO 1000ML

## (undated) DEVICE — SOLIDIFIER LIQLOC PLS 1500CC BT

## (undated) DEVICE — Device: Brand: DEFENDO AIR/WATER/SUCTION AND BIOPSY VALVE

## (undated) DEVICE — DEFENDO AIR WATER SUCTION AND BIOPSY VALVE KIT FOR  OLYMPUS: Brand: DEFENDO AIR/WATER/SUCTION AND BIOPSY VALVE

## (undated) DEVICE — SINGLE-USE BIOPSY FORCEPS: Brand: RADIAL JAW 4

## (undated) DEVICE — THE STERILE LIGHT HANDLE COVER IS USED WITH STERIS SURGICAL LIGHTING AND VISUALIZATION SYSTEMS.

## (undated) DEVICE — SOL IRRG H2O PL/BG 1000ML STRL

## (undated) DEVICE — Device: Brand: SINGLE USE INJECTOR NM600/610

## (undated) DEVICE — PAD GRND REM POLYHESIVE A/ DISP

## (undated) DEVICE — CONN JET HYDRA H20 AUXILIARY DISP

## (undated) DEVICE — Device

## (undated) DEVICE — DISPOSABLE DISTAL ATTACHMENT: Brand: DISPOSABLE DISTAL ATTACHMENT

## (undated) DEVICE — SNAR E/S POLYP SNAREMASTER OVL/10MM 2.8X2300MM YEL

## (undated) DEVICE — COLON KIT: Brand: MEDLINE INDUSTRIES, INC.

## (undated) DEVICE — BLCK/BITE BLOX WO/DENTL/RIM W/STRAP 54F

## (undated) DEVICE — THE SINGLE USE ETRAP – POLYP TRAP IS USED FOR SUCTION RETRIEVAL OF ENDOSCOPICALLY REMOVED POLYPS.: Brand: ETRAP

## (undated) DEVICE — LINER SURG CANSTR SXN S/RIGD 1500CC

## (undated) DEVICE — SNAR POLYP CAPTIFLEX XS/OVL 11X2.4MM 240CM 1P/U

## (undated) DEVICE — Device: Brand: BLACK EYE

## (undated) DEVICE — INJ SUB/MUCUS ELEVIEW FOR/GI/ENDO/PROC AMPL/10ML